# Patient Record
Sex: FEMALE | Race: WHITE | NOT HISPANIC OR LATINO | Employment: UNEMPLOYED | URBAN - METROPOLITAN AREA
[De-identification: names, ages, dates, MRNs, and addresses within clinical notes are randomized per-mention and may not be internally consistent; named-entity substitution may affect disease eponyms.]

---

## 2017-01-12 ENCOUNTER — OFFICE VISIT (OUTPATIENT)
Dept: OBSTETRICS AND GYNECOLOGY | Facility: CLINIC | Age: 52
End: 2017-01-12
Attending: OBSTETRICS & GYNECOLOGY
Payer: COMMERCIAL

## 2017-01-12 VITALS
SYSTOLIC BLOOD PRESSURE: 124 MMHG | BODY MASS INDEX: 24.01 KG/M2 | DIASTOLIC BLOOD PRESSURE: 82 MMHG | HEIGHT: 64 IN | WEIGHT: 140.63 LBS

## 2017-01-12 DIAGNOSIS — Z12.4 SCREENING FOR MALIGNANT NEOPLASM OF THE CERVIX: ICD-10-CM

## 2017-01-12 DIAGNOSIS — N92.1 MENORRHAGIA WITH IRREGULAR CYCLE: ICD-10-CM

## 2017-01-12 DIAGNOSIS — Z01.419 ENCOUNTER FOR GYNECOLOGICAL EXAMINATION WITHOUT ABNORMAL FINDING: ICD-10-CM

## 2017-01-12 DIAGNOSIS — Z12.31 OTHER SCREENING MAMMOGRAM: Primary | ICD-10-CM

## 2017-01-12 DIAGNOSIS — R32 INCONTINENCE OF URINE IN FEMALE: ICD-10-CM

## 2017-01-12 DIAGNOSIS — Z13.9 SCREENING: ICD-10-CM

## 2017-01-12 PROCEDURE — 99999 PR PBB SHADOW E&M-EST. PATIENT-LVL III: CPT | Mod: PBBFAC,,, | Performed by: OBSTETRICS & GYNECOLOGY

## 2017-01-12 PROCEDURE — 99396 PREV VISIT EST AGE 40-64: CPT | Mod: S$GLB,,, | Performed by: OBSTETRICS & GYNECOLOGY

## 2017-01-12 PROCEDURE — 88141 CYTOPATH C/V INTERPRET: CPT | Mod: ,,, | Performed by: PATHOLOGY

## 2017-01-12 PROCEDURE — 88175 CYTOPATH C/V AUTO FLUID REDO: CPT | Performed by: PATHOLOGY

## 2017-01-12 NOTE — PROGRESS NOTES
SUBJECTIVE:   51 y.o. female   for annual routine Pap and checkup. No LMP recorded..  She complains of having daily bleeding on OCPs for last 6 months. She reports that starting OCPs helped with her heavy bleeding but now she has daily bleeding. She also reports leaking urine with cough, sneeze.        Past Medical History   Diagnosis Date    Abnormal Pap smear of vagina     Constipation     Hemorrhoids     Hyperlipidemia     Thyroid nodule      Past Surgical History   Procedure Laterality Date    Colposcopy  2014    Appendectomy      Colonoscopy       4 as of  [facility], next due in     Excisional hemorrhoidectomy       Social History     Social History    Marital status:      Spouse name: N/A    Number of children: N/A    Years of education: N/A     Occupational History           - not practicing     Social History Main Topics    Smoking status: Never Smoker    Smokeless tobacco: Not on file    Alcohol use Yes      Comment: socially    Drug use: No    Sexual activity: Yes     Partners: Male     Birth control/ protection: None, OCP     Other Topics Concern    Not on file     Social History Narrative    Originally from [place]    Living in [place] with family        Getting reg exercise     Family History   Problem Relation Age of Onset    Breast cancer Maternal Grandmother     Cancer Maternal Grandmother      breast    Hypertension Mother     Cancer Father      lung, + tobacco    No Known Problems Brother     No Known Problems Daughter     Other Son      lymphedema    No Known Problems Son     No Known Problems Son     No Known Problems Daughter     Ovarian cancer Neg Hx     Colon cancer Neg Hx      OB History    Para Term  AB SAB TAB Ectopic Multiple Living   5 5              # Outcome Date GA Lbr Charanjit/2nd Weight Sex Delivery Anes PTL Lv   5 Para      Vag-Spont      4 Para      Vag-Spont      3 Para      Vag-Spont      2 Para       Vag-Spont      1 Para      Vag-Spont               Current Outpatient Prescriptions   Medication Sig Dispense Refill    cetirizine (ZYRTEC) 5 MG tablet Take 5 mg by mouth once daily.      desog-e.estradiol/e.estradiol (KARIVA) 0.15-0.02 mgx21 /0.01 mg x 5 per tablet Take 1 tablet by mouth once daily. 28 tablet 11    fluticasone (FLONASE) 50 mcg/actuation nasal spray 1 spray by Each Nare route once daily.      multivitamin capsule Take 1 capsule by mouth once daily.      ferrous gluconate (FERGON) 325 MG Tab Take 1 tablet (325 mg total) by mouth 2 (two) times daily with meals.  0    tamsulosin (FLOMAX) 0.4 mg Cp24 Take 1 capsule (0.4 mg total) by mouth once daily. 30 capsule 0     No current facility-administered medications for this visit.      Allergies: Review of patient's allergies indicates no known allergies.     ROS:  Constitutional: no weight loss, weight gain, fever, fatigue  Eyes:  No vision changes, glasses/contacts  ENT/Mouth: No ulcers, sinus problems, ears ringing, headache  Cardiovascular: No inability to lie flat, chest pain, exercise intolerance, swelling, heart palpitations  Respiratory: No wheezing, coughing blood, shortness of breath, or cough  Gastrointestinal: No diarrhea, bloody stool, nausea/vomiting, constipation, gas, hemorrhoids  Genitourinary: No blood in urine, painful urination, urgency of urination, frequency of urination, incomplete emptying,+incontinence, +abnormal bleeding, painful periods, heavy periods, vaginal discharge, vaginal odor, painful intercourse, sexual problems, bleeding after intercourse.  Musculoskeletal: No muscle weakness  Skin/Breast: No painful breasts, nipple discharge, masses, rash, ulcers  Neurological: No passing out, seizures, numbness, headache  Endocrine: No diabetes, hypothyroid, hyperthyroid, hot flashes, hair loss, abnormal hair growth, acne  Psychiatric: No depression, crying  Hematologic: No bruises, bleeding, swollen lymph nodes,  anemia.      Physical Exam:   Constitutional: She is oriented to person, place, and time. She appears well-developed and well-nourished.      Neck: Normal range of motion. No tracheal deviation present. No thyromegaly present.    Cardiovascular: Exam reveals no edema.     Pulmonary/Chest: Effort normal. She exhibits no mass, no tenderness, no deformity and no retraction. Right breast exhibits no inverted nipple, no mass, no nipple discharge, no skin change, no tenderness, presence, no bleeding and no swelling. Left breast exhibits no inverted nipple, no mass, no nipple discharge, no skin change, no tenderness, presence, no bleeding and no swelling. Breasts are symmetrical.        Abdominal: Soft. She exhibits no distension and no mass. There is no tenderness. There is no rebound and no guarding. No hernia. Hernia confirmed negative in the left inguinal area.     Genitourinary: Vagina normal and uterus normal. Rectal exam shows no external hemorrhoid. There is no rash, tenderness or lesion on the right labia. There is no rash, tenderness or lesion on the left labia. Uterus is not deviated. Cervix is normal. No no adexnal prolapse. Right adnexum displays no mass, no tenderness and no fullness. Left adnexum displays no mass, no tenderness and no fullness. No tenderness, bleeding, rectocele, cystocele or unspecified prolapse of vaginal walls in the vagina. No vaginal discharge found. Cervix exhibits no motion tenderness, no discharge and no friability.           Musculoskeletal: Normal range of motion and moves all extremeties. She exhibits no edema.      Lymphadenopathy:        Right: No inguinal adenopathy present.        Left: No inguinal adenopathy present.    Neurological: She is alert and oriented to person, place, and time.    Skin: No rash noted. No erythema. No pallor.    Psychiatric: She has a normal mood and affect. Her behavior is normal. Judgment and thought content normal.         ASSESSMENT:   well  woman  no contraindication to continue use of oral contraceptives    PLAN:   mammogram  pap smear  return annually or prn  Will order pelvic ultrasound  Consult with Urogyn  Counseled patient to not do continuous OCPS- stop for 1 week then restart

## 2017-01-12 NOTE — MR AVS SNAPSHOT
[facility], [place] [address]  [address]  [place] [address]  Phone: [phone]  Fax: [phone]                  Corinne Morrison Marcus   2017 11:45 AM   Office Visit    Description:  Female : 1965   Provider:  Keshia Marino MD   Department:  [facility], [place] [address]           Reason for Visit     Annual Exam     Vaginal Bleeding                To Do List           Goals (5 Years of Data)     None      [facility]     [facility] On Call Nurse Care Line -  Assistance  Registered nurses in the [facility] On Call Center provide clinical advisement, health education, appointment booking, and other advisory services.  Call for this free service at [phone].             Medications           Message regarding Medications     Verify the changes and/or additions to your medication regime listed below are the same as discussed with your clinician today.  If any of these changes or additions are incorrect, please notify your healthcare provider.             Verify that the below list of medications is an accurate representation of the medications you are currently taking.  If none reported, the list may be blank. If incorrect, please contact your healthcare provider. Carry this list with you in case of emergency.           Current Medications     cetirizine (ZYRTEC) 5 MG tablet Take 5 mg by mouth once daily.    desog-e.estradiol/e.estradiol (KARIVA) 0.15-0.02 mgx21 /0.01 mg x 5 per tablet Take 1 tablet by mouth once daily.    fluticasone (FLONASE) 50 mcg/actuation nasal spray 1 spray by Each Nare route once daily.    multivitamin capsule Take 1 capsule by mouth once daily.    ferrous gluconate (FERGON) 325 MG Tab Take 1 tablet (325 mg total) by mouth 2 (two) times daily with meals.    tamsulosin (FLOMAX) 0.4 mg Cp24 Take 1 capsule (0.4 mg total) by mouth once daily.           Clinical Reference Information           Vital Signs - Last Recorded  Most recent update:  "1/12/2017 12:06 PM by Ange King MA    BP Ht Wt BMI       124/82 5' 4" (1.626 m) 63.8 kg (140 lb 10.5 oz) 24.14 kg/m2       Blood Pressure          Most Recent Value    BP  124/82      Allergies as of 1/12/2017     No Known Allergies      Immunizations Administered on Date of Encounter - 1/12/2017     None      "

## 2017-01-16 ENCOUNTER — TELEPHONE (OUTPATIENT)
Dept: OBSTETRICS AND GYNECOLOGY | Facility: CLINIC | Age: 52
End: 2017-01-16

## 2017-01-16 DIAGNOSIS — N95.1 MENOPAUSAL SYMPTOM: Primary | ICD-10-CM

## 2017-01-18 ENCOUNTER — HOSPITAL ENCOUNTER (OUTPATIENT)
Dept: RADIOLOGY | Facility: OTHER | Age: 52
Discharge: HOME | End: 2017-01-18
Attending: OBSTETRICS & GYNECOLOGY
Payer: COMMERCIAL

## 2017-01-18 DIAGNOSIS — N92.1 MENORRHAGIA WITH IRREGULAR CYCLE: ICD-10-CM

## 2017-01-18 PROCEDURE — 76830 TRANSVAGINAL US NON-OB: CPT | Mod: 26,,, | Performed by: RADIOLOGY

## 2017-01-18 PROCEDURE — 76856 US EXAM PELVIC COMPLETE: CPT | Mod: TC

## 2017-01-18 PROCEDURE — 76856 US EXAM PELVIC COMPLETE: CPT | Mod: 26,,, | Performed by: RADIOLOGY

## 2017-01-24 ENCOUNTER — HOSPITAL ENCOUNTER (OUTPATIENT)
Dept: RADIOLOGY | Facility: OTHER | Age: 52
Discharge: HOME | End: 2017-01-24
Attending: OBSTETRICS & GYNECOLOGY
Payer: COMMERCIAL

## 2017-01-24 ENCOUNTER — INITIAL CONSULT (OUTPATIENT)
Dept: UROGYNECOLOGY | Facility: CLINIC | Age: 52
End: 2017-01-24
Attending: OBSTETRICS & GYNECOLOGY
Payer: COMMERCIAL

## 2017-01-24 VITALS — WEIGHT: 143.31 LBS | HEIGHT: 64 IN | BODY MASS INDEX: 24.46 KG/M2

## 2017-01-24 DIAGNOSIS — K59.01 SLOW TRANSIT CONSTIPATION: ICD-10-CM

## 2017-01-24 DIAGNOSIS — N39.3 SUI (STRESS URINARY INCONTINENCE, FEMALE): Primary | ICD-10-CM

## 2017-01-24 DIAGNOSIS — Z12.31 OTHER SCREENING MAMMOGRAM: ICD-10-CM

## 2017-01-24 PROCEDURE — 77067 SCR MAMMO BI INCL CAD: CPT | Mod: TC

## 2017-01-24 PROCEDURE — 77067 SCR MAMMO BI INCL CAD: CPT | Mod: 26,,, | Performed by: RADIOLOGY

## 2017-01-24 PROCEDURE — 99999 PR PBB SHADOW E&M-EST. PATIENT-LVL II: CPT | Mod: PBBFAC,,, | Performed by: OBSTETRICS & GYNECOLOGY

## 2017-01-24 PROCEDURE — 77063 BREAST TOMOSYNTHESIS BI: CPT | Mod: 26,,, | Performed by: RADIOLOGY

## 2017-01-24 PROCEDURE — 1159F MED LIST DOCD IN RCRD: CPT | Mod: S$GLB,,, | Performed by: OBSTETRICS & GYNECOLOGY

## 2017-01-24 PROCEDURE — 99204 OFFICE O/P NEW MOD 45 MIN: CPT | Mod: S$GLB,,, | Performed by: OBSTETRICS & GYNECOLOGY

## 2017-01-24 NOTE — PROGRESS NOTES
Subjective:       Patient ID: Corinne Morrison Marcus is a 51 y.o. female.    Chief Complaint:  Consult and Urinary Incontinence      History of Present Illness: 50 YO F with bothersome urinary incontinence worsening over the past several years, now especially bothersome with exercise.  Referred by Dr. Marino for evaluation.    HPI   Ohs Peq Urogyn Hpi      Question    1/24/2017  1:37 PM CST     General Urogynecology: Are you experiencing the following?       Dysuria (painful urination)  No     Nocturia:  waking up at night to empty your bladder   Yes     If you answered yes to the previous question, how many times does this happen per night?  1-2,      Enuresis (urine loss during sleep)  No     Dribbling urine after you urinate  No     Hematuria (urine appears red)  No     Type of stream  Strong     Urinary Incontinence (General): Are you experiencing the following?       Past consultation for incontinence: Have you ever seen someone for the evaluation of incontinence?  No     If you answered yes to the previous question, please select all the therapies you have tried.   N/a- I answered no to the previous question     Please note the effectiveness of the therapies.       Need to wear protection to keep clothes dry   Yes     If you answered yes to the previous question, please imelda the protection you use.   Panty liner, only with activity        Pads     If you wear protection, how much wetness is typically on each pad?  Slight     If you wear protection, how often do you have to change per day, if applicable?   2     Stress Symptoms: Are you experiencing the following?       Leakage of urine with cough, laugh and/or sneeze  Yes     If you answered yes to the previous question, what is the frequency in days, weeks and/or months?  Several times a week     Leakage of urine with sex  No     Leakage of urine with bending/ lifting  Yes     Leakage of urine with briskly walking or jogging  Yes     If you lose urine for  "any other reason not previously mentioned, please note it below, if applicable.        Urge Symptoms: Are you experiencing the following?       Urgency ("got to go" feeling)  No     Urge: How frequently do you feel an urge to urinate (feeling like you "gotta go" to the bathroom and can't wait)  Never     Do you experience a leakage of urine when you have a feeling of urgency?   No     Leakage of urine when unaware  Yes     Past use of anticholinergics (medications used to treat overactive bladder)  No     If you answered yes to the previous question, please imelda the anticholinergics you have used:        Have you ever used Mirbetriq (aka Mirabegron)?   No     Prolapse Symptoms: Are you experiencing any of the following?        Falling out/ Bulging/ Heaviness in the vagina  No     Vaginal/ Abdominal Pain/ Pressure  No     Need to strain/ Push to void  No     Need to wait on the toilet before you void  No     Unusual position to urinate (using your hands to push back the vaginal bulge)  No     Sensation of incomplete emptying  No     Past use of pessary device  No     If you answered yes to the previous question, please list the devices you have used below.        Bowel Symptoms: Are you experiencing any of the following?       Constipation  Yes     Diarrhea   No     Hematochezia (bloody stool)  Yes     Incomplete evacuation of stool  Yes     Involuntary loss of formed stool  No     Fecal smearing/urgency  No     Involuntary loss of gas  No     Vaginal Symptoms: Are you experiencing any of the following?        Abnormal vaginal bleeding   Yes     Vaginal dryness  No     Sexually active   Yes     Dyspareunia (painful intercourse)  No     Estrogen use   Yes   HPI reviewed and confirmed with patient     GYN & OB History  No LMP recorded.   Date of Last Pap: 2017    OB History    Para Term  AB SAB TAB Ectopic Multiple Living   5 5              # Outcome Date GA Lbr Charanjit/2nd Weight Sex Delivery Anes PTL " Lv   5 Para      Vag-Spont      4 Para      Vag-Spont      3 Para      Vag-Spont      2 Para      Vag-Spont      1 Para      Vag-Spont           Past Medical History   Diagnosis Date    Abnormal Pap smear of vagina     Constipation     Hemorrhoids     Hyperlipidemia     Thyroid nodule      Past Surgical History   Procedure Laterality Date    Colposcopy  11/19/2014    Appendectomy      Colonoscopy       4 as of 2015 [facility] at [facility], next due in 2025    Excisional hemorrhoidectomy           Current Outpatient Prescriptions:     cetirizine (ZYRTEC) 5 MG tablet, Take 5 mg by mouth once daily., Disp: , Rfl:     desog-e.estradiol/e.estradiol (KARIVA) 0.15-0.02 mgx21 /0.01 mg x 5 per tablet, Take 1 tablet by mouth once daily., Disp: 28 tablet, Rfl: 11    fluticasone (FLONASE) 50 mcg/actuation nasal spray, 1 spray by Each Nare route once daily., Disp: , Rfl:     multivitamin capsule, Take 1 capsule by mouth once daily., Disp: , Rfl:       Review of patient's allergies indicates:  No Known Allergies    Review of Systems  Review of Systems   Constitutional: Negative.  Negative for activity change, appetite change, chills, diaphoresis, fatigue, fever and unexpected weight change.   HENT: Negative.    Eyes: Negative.    Respiratory: Negative.  Negative for apnea, cough and wheezing.    Cardiovascular: Negative.  Negative for chest pain and palpitations.   Gastrointestinal: Positive for constipation. Negative for abdominal distention, abdominal pain, anal bleeding, blood in stool, diarrhea, nausea, rectal pain and vomiting.   Endocrine: Negative.    Genitourinary: Positive for frequency and urgency. Negative for decreased urine volume, difficulty urinating, dyspareunia, dysuria, enuresis, flank pain, genital sores, hematuria, menstrual problem, pelvic pain, vaginal bleeding, vaginal discharge and vaginal pain.   Musculoskeletal: Negative for back pain and gait problem.   Skin: Negative for color change, pallor,  rash and wound.   Allergic/Immunologic: Negative for immunocompromised state.   Neurological: Negative.  Negative for dizziness and speech difficulty.   Hematological: Negative for adenopathy.   Psychiatric/Behavioral: Negative for agitation, behavioral problems, confusion and sleep disturbance.           Objective:     Physical Exam   Constitutional: She is oriented to person, place, and time. She appears well-developed.   HENT:   Head: Normocephalic and atraumatic.   Eyes: Conjunctivae and EOM are normal.   Neck: Normal range of motion. Neck supple.   Cardiovascular: Normal rate, regular rhythm, S1 normal, S2 normal, normal heart sounds and intact distal pulses.    Pulmonary/Chest: Effort normal and breath sounds normal. She exhibits no tenderness.   Abdominal: Soft. Bowel sounds are normal. She exhibits no distension and no mass. There is no hepatosplenomegaly, splenomegaly or hepatomegaly. There is no tenderness. There is no rigidity, no rebound, no guarding and no CVA tenderness. No hernia.   Genitourinary: Pelvic exam was performed with patient supine. Rectum normal, vagina normal, uterus normal, cervix normal, skenes normal and bartholins normal. Right labia normal and left labia normal. Urethra exhibits hypermobility. Urethra exhibits no urethral caruncle, no urethral diverticulum and no urethral mass. Right bartholin is not enlarged and not tender. Left bartholin is not enlarged and not tender. Rectal exam shows resting tone normal and active tone normal. Rectal exam shows no external hemorrhoid, no fissure, no tenderness, anal tone normal and no dovetailing. Guaiac negative stool. There is atrophy in the vagina. No foreign body, tenderness, bleeding, unspecified prolapse of vaginal walls, fistula, mesh exposure or lavator tenderness in the vagina. No vaginal discharge found. Right adnexum displays no mass and no tenderness. Left adnexum displays no mass and no tenderness. Cervix exhibits no motion  tenderness and no discharge. Uterus is not tender and not experiencing uterine prolapse.   PVR: 50 ML  Empty cough stress test: Negative.  Kegel: 3/5    POP-Q  Aa: -2 Ba: -2 C: -5   GH: 3 PB: 2 TVL: 8   Ap: -1 Bp: -1 D: -6                     Musculoskeletal: Normal range of motion.   Lymphadenopathy:     She has no axillary adenopathy.        Right: No inguinal adenopathy present.        Left: No inguinal adenopathy present.   Neurological: She is alert and oriented to person, place, and time. She has normal strength and normal reflexes. Cranial nerves II through XII intact. No cranial nerve deficit.   Skin: Skin is warm, dry and intact.   Psychiatric: She has a normal mood and affect. Her speech is normal and behavior is normal. Judgment normal. Cognition and memory are normal.          Assessment:        1. YELITZA (stress urinary incontinence, female)    2. Slow transit constipation               Plan:      1. Urinary incontinence,stress:   --Bladder diary for 3-7 days, write the number of times you go to the rest room and associated symptoms of urgency or leakage.   --Empty bladder every 3 hours.  Empty well: wait a minute, lean forward on toilet.    --KEGELS: do 10 in AM and 10 in PM, holding each x 10 seconds.  When you feel urge to go, STOP, KEGEL, and when urge has passed, then go to bathroom.  Consider PT in future.    --STRESS:  Pessary vs. Sling     2. Prolapse: Asymptomatic Stage 2 posterior vaginal wall prolapse   --Daily pelvic floor exercises as assigned, consider Pelvic floor PT in future  --Non surgical options discussed with patient    3. Constipation:   Controlling constipation may help bladder urgency/leakage and fiber may better control cholesterol and blood glucose.  Start daily fiber.  Take 1 tsp of fiber powder (psyllium or other sugar-free powder).  Mix in 8 oz of water.  Take x 3-5 days.  Then, increase fiber by 1 tsp every 3-5 days until stool is easy to pass.  Stop and continue at that dose.    Do not exceed 6 tsps/day.  May also use over the counter stool softener 1-2 x/day.  AVOID laxatives.    Approximately 45 min were spent in consult, 90 % in discussion.     Thank you for requesting consultation of your patient.  I look forward to participating in her care.    Curt Irving DO  Female Pelvic Medicine and Reconstructive Surgery  [facility] [place], [place]

## 2017-01-24 NOTE — LETTER
January 26, 2017      Keshia Marino MD  [address] [address]           [facility]  [address] [address]  Phone: [phone]          Patient: Corinne Morrison Marcus   MR Number: [id number]   YOB: 1965   Date of Visit: 1/24/2017       Dear Dr. Keshia Marino:    Thank you for referring Corinne Marcus to me for evaluation. Attached you will find relevant portions of my assessment and plan of care.    If you have questions, please do not hesitate to call me. I look forward to following Corinne Marcus along with you.    Sincerely,    Curt Irving,     Enclosure  CC:  No Recipients    If you would like to receive this communication electronically, please contact [email or web address] or [phone] to request more information on [employer] Link access.    For providers and/or their staff who would like to refer a patient to [facility], please contact us through our one-stop-shop provider referral line, [facility], at [phone].    If you feel you have received this communication in error or would no longer like to receive these types of communications, please e-mail [email or web address]

## 2017-03-14 ENCOUNTER — OFFICE VISIT (OUTPATIENT)
Dept: INTERNAL MEDICINE | Facility: CLINIC | Age: 52
End: 2017-03-14
Attending: INTERNAL MEDICINE
Payer: COMMERCIAL

## 2017-03-14 VITALS
HEART RATE: 78 BPM | BODY MASS INDEX: 24.05 KG/M2 | DIASTOLIC BLOOD PRESSURE: 78 MMHG | SYSTOLIC BLOOD PRESSURE: 114 MMHG | OXYGEN SATURATION: 97 % | WEIGHT: 140.88 LBS | HEIGHT: 64 IN

## 2017-03-14 DIAGNOSIS — Z00.00 ANNUAL PHYSICAL EXAM: Primary | ICD-10-CM

## 2017-03-14 DIAGNOSIS — R73.9 HYPERGLYCEMIA: ICD-10-CM

## 2017-03-14 DIAGNOSIS — E04.1 THYROID NODULE: ICD-10-CM

## 2017-03-14 DIAGNOSIS — E78.5 HYPERLIPIDEMIA, UNSPECIFIED HYPERLIPIDEMIA TYPE: ICD-10-CM

## 2017-03-14 PROCEDURE — 99999 PR PBB SHADOW E&M-EST. PATIENT-LVL III: CPT | Mod: PBBFAC,,, | Performed by: INTERNAL MEDICINE

## 2017-03-14 PROCEDURE — 99396 PREV VISIT EST AGE 40-64: CPT | Mod: S$GLB,,, | Performed by: INTERNAL MEDICINE

## 2017-03-14 NOTE — MR AVS SNAPSHOT
[facility]  [address]  [place] [address]  Phone: [phone]  Fax: [phone]                  Corinne Morrison Marcus   3/14/2017 1:00 PM   Office Visit    Description:  Female : 1965   Provider:  Elaine Fairbanks MD   Department:  [facility]           Reason for Visit     Annual Exam           Diagnoses this Visit        Comments    Annual physical exam    -  Primary     Hyperglycemia         Thyroid nodule         Hyperlipidemia, unspecified hyperlipidemia type                To Do List           Future Appointments        Provider Department Dept Phone    3/28/2017 8:00 AM LAB, [facility] [phone]    3/28/2017 9:00 AM [facility] USOP2 [facility] [phone]    2017 10:30 AM [facility] CT OP LIMIT 450 LBS [facility] [phone]      Goals (5 Years of Data)     None      Follow-Up and Disposition     Return in about 1 year (around 3/14/2018), or if symptoms worsen or fail to improve.      [facility]     [facility] On [facility] Nurse Care Line -  Assistance  Registered nurses in the [facility] On Call Center provide clinical advisement, health education, appointment booking, and other advisory services.  Call for this free service at [phone].             Medications           Message regarding Medications     Verify the changes and/or additions to your medication regime listed below are the same as discussed with your clinician today.  If any of these changes or additions are incorrect, please notify your healthcare provider.             Verify that the below list of medications is an accurate representation of the medications you are currently taking.  If none reported, the list may be blank. If incorrect, please contact your healthcare provider. Carry this list with you in case of emergency.           Current Medications     cetirizine (ZYRTEC) 5 MG tablet Take 5 mg by mouth once  "daily.    desog-e.estradiol/e.estradiol (KARIVA) 0.15-0.02 mgx21 /0.01 mg x 5 per tablet Take 1 tablet by mouth once daily.    fluticasone (FLONASE) 50 mcg/actuation nasal spray 1 spray by Each Nare route once daily.    multivitamin capsule Take 1 capsule by mouth once daily.           Clinical Reference Information           Your Vitals Were     BP Pulse Height Weight Last Period SpO2    114/78 (BP Location: Left arm, Patient Position: Sitting, BP Method: Manual) 78 5' 4" (1.626 m) 63.9 kg (140 lb 14 oz) 01/24/2017 97%    BMI                24.18 kg/m2          Blood Pressure          Most Recent Value    BP  114/78      Allergies as of 3/14/2017     No Known Allergies      Immunizations Administered on Date of Encounter - 3/14/2017     None      Orders Placed During Today's Visit      Normal Orders This Visit    Ambulatory Referral to Endocrinology     Future Labs/Procedures Expected by Expires    Hemoglobin A1c  3/14/2017 3/28/2018    Lipid panel  3/14/2017 3/14/2018    US Soft Tissue Head Neck Thyroid  3/14/2017 3/14/2018      Language Assistance Services     ATTENTION: Language assistance services are available, free of charge. Please call [phone].      ATENCIÓN: Si habla irma, tiene a cotto disposición servicios gratuitos de asistencia lingüística. Llame al [phone].     [facility] Ý: N?u b?n nói Ti?ng Vi?t, có các d?ch v? h? tr? ngôn ng? mi?n phí dành cho b?n. G?i s? [phone].         [facility] complies with applicable Federal civil rights laws and does not discriminate on the basis of race, color, national origin, age, disability, or sex.        "

## 2017-03-14 NOTE — PROGRESS NOTES
Subjective:       Patient ID: Corinne Morrison Marcus is a 51 y.o. female.    Chief Complaint: Annual Exam    HPI     Pt here for annual exam. Reviewed recent labs with pt. She is doing well.   F/u with isac in gyn.   Has hx of thyroid us and s/p fna in 2011 - seen by Dr. Carrera - is due for thyroid US and f/u    Review of Systems    Objective:      Physical Exam   Constitutional: She is oriented to person, place, and time. She appears well-developed and well-nourished.   HENT:   Head: Normocephalic and atraumatic.   Right Ear: External ear normal.   Left Ear: External ear normal.   Nose: Nose normal.   Mouth/Throat: Oropharynx is clear and moist. No oropharyngeal exudate.   No carotid bruits   Eyes: Conjunctivae and EOM are normal.   Neck: Neck supple. No thyromegaly present.   Cardiovascular: Normal rate, regular rhythm, normal heart sounds and intact distal pulses.    Pulmonary/Chest: Effort normal and breath sounds normal.   Abdominal: Soft. Bowel sounds are normal.   Musculoskeletal: She exhibits no edema or tenderness.   Lymphadenopathy:     She has no cervical adenopathy.   Neurological: She is alert and oriented to person, place, and time. Coordination normal.   Skin: Skin is warm and dry.   Psychiatric: She has a normal mood and affect. Her behavior is normal. Judgment and thought content normal.       Assessment:       1. Annual physical exam    2. Hyperglycemia    3. Thyroid nodule    4. Hyperlipidemia, unspecified hyperlipidemia type        Plan:       Corinne was seen today for annual exam.    Diagnoses and all orders for this visit:    Annual physical exam: Recommend daily sunscreen, cardiovascular exercise min 30 min 5 days per week. Seatbelts routinely.    Hyperglycemia  -     Hemoglobin A1c; Future    Thyroid nodule:   -     US Soft Tissue Head Neck Thyroid; Future  -     Ambulatory Referral to Endocrinology    Hyperlipidemia, unspecified hyperlipidemia type: ascvd risk 0.9% - I recommend regular  exercise along with a diet low in fat and cholesterol and high in fiber and fresh fruits and vegetables.   -     Lipid panel; Future    HM: reviewed and udpated. - declines tdap

## 2017-05-25 ENCOUNTER — TELEPHONE (OUTPATIENT)
Dept: INTERNAL MEDICINE | Facility: CLINIC | Age: 52
End: 2017-05-25

## 2017-05-25 NOTE — TELEPHONE ENCOUNTER
"----- Message from Celena West sent at 5/25/2017  7:45 AM CDT -----  Contact: self  Please see comment below.     Appointment Request From: Patricia Mercado    With Provider: Elaine Fairbanks MD [[facility] - Internal Medicine]    Would Accept With:Request to schedule a test or procedure    Preferred Date Range: Any date 6/7/2017 or later    Preferred Times: Any    Reason for visit: Request an Appt    Comments:  Hi would it be possible for you to help me re-schedule 2 appts I missed (my  wound up getting an appointment at [facility] and we just left town) Anyway this is what I need:    1. LAB, BAP - CBC, CMP, Lipids and TSH - then can you share this with Dr. Corey Morris, fax [phone]  - any chance I could do this Wed., June 7th at 10:00 a.m.?    2. [facility] USOP2 - after the test above on June 7th or maybe June 5th at 11:30 (I have a [facility] ct op) scheduled on June 5th at 10:30 a.m.    Thank you for the assistance. Please feel free to call me.  Corinne "Rini" Marcus, birthdate 1965  [phone] cell      "

## 2017-05-25 NOTE — TELEPHONE ENCOUNTER
Pt's appointments and labs have been scheduled as requested. Pt has no further questions at this time.

## 2017-06-05 ENCOUNTER — HOSPITAL ENCOUNTER (OUTPATIENT)
Dept: RADIOLOGY | Facility: OTHER | Age: 52
Discharge: HOME | End: 2017-06-05
Attending: INTERNAL MEDICINE
Payer: COMMERCIAL

## 2017-06-05 DIAGNOSIS — E04.1 THYROID NODULE: ICD-10-CM

## 2017-06-05 DIAGNOSIS — Z77.22 SECOND HAND SMOKE EXPOSURE: ICD-10-CM

## 2017-06-05 PROCEDURE — 76536 US EXAM OF HEAD AND NECK: CPT | Mod: 26,,, | Performed by: RADIOLOGY

## 2017-06-05 PROCEDURE — 71250 CT THORAX DX C-: CPT | Mod: TC

## 2017-06-05 PROCEDURE — 71250 CT THORAX DX C-: CPT | Mod: 26,,, | Performed by: RADIOLOGY

## 2017-06-05 PROCEDURE — 76536 US EXAM OF HEAD AND NECK: CPT | Mod: TC

## 2017-06-07 ENCOUNTER — TELEPHONE (OUTPATIENT)
Dept: INTERNAL MEDICINE | Facility: CLINIC | Age: 52
End: 2017-06-07

## 2017-06-07 ENCOUNTER — LAB VISIT (OUTPATIENT)
Dept: LAB | Facility: OTHER | Age: 52
End: 2017-06-07
Attending: INTERNAL MEDICINE
Payer: COMMERCIAL

## 2017-06-07 DIAGNOSIS — E78.5 HYPERLIPIDEMIA, UNSPECIFIED HYPERLIPIDEMIA TYPE: ICD-10-CM

## 2017-06-07 DIAGNOSIS — R73.9 HYPERGLYCEMIA: ICD-10-CM

## 2017-06-07 DIAGNOSIS — R91.1 LUNG NODULE: Primary | ICD-10-CM

## 2017-06-07 LAB
CHOLEST/HDLC SERPL: 3.1 {RATIO}
HDL/CHOLESTEROL RATIO: 31.9 %
HDLC SERPL-MCNC: 279 MG/DL
HDLC SERPL-MCNC: 89 MG/DL
LDLC SERPL CALC-MCNC: 154.8 MG/DL
NONHDLC SERPL-MCNC: 190 MG/DL
TRIGL SERPL-MCNC: 176 MG/DL

## 2017-06-07 PROCEDURE — 83036 HEMOGLOBIN GLYCOSYLATED A1C: CPT

## 2017-06-07 PROCEDURE — 36415 COLL VENOUS BLD VENIPUNCTURE: CPT

## 2017-06-07 PROCEDURE — 80061 LIPID PANEL: CPT

## 2017-06-07 NOTE — TELEPHONE ENCOUNTER
Message sent to pt via my chart with lab results and updates to plan.   Plan to repeat ct chest in 1 year for monitoring - if stable at that time then no further imaging indicated.

## 2017-06-08 LAB
ESTIMATED AVG GLUCOSE: 108 MG/DL
HBA1C MFR BLD HPLC: 5.4 %

## 2017-08-23 RX ORDER — DESOGESTREL/ETHINYL ESTRADIOL AND ETHINYL ESTRADIOL 21-5 (28)
KIT ORAL
Qty: 28 TABLET | Refills: 0 | Status: SHIPPED | OUTPATIENT
Start: 2017-08-23 | End: 2017-09-19 | Stop reason: SDUPTHER

## 2017-09-19 DIAGNOSIS — Z30.9 ENCOUNTER FOR CONTRACEPTIVE MANAGEMENT, UNSPECIFIED TYPE: Primary | ICD-10-CM

## 2017-09-20 RX ORDER — DESOGESTREL AND ETHINYL ESTRADIOL 21-5 (28)
1 KIT ORAL DAILY
Qty: 28 TABLET | Refills: 4 | Status: SHIPPED | OUTPATIENT
Start: 2017-09-20 | End: 2017-11-14 | Stop reason: SDUPTHER

## 2017-11-06 ENCOUNTER — TELEPHONE (OUTPATIENT)
Dept: OBSTETRICS AND GYNECOLOGY | Facility: CLINIC | Age: 52
End: 2017-11-06

## 2017-11-06 DIAGNOSIS — Z13.220 SCREENING CHOLESTEROL LEVEL: ICD-10-CM

## 2017-11-06 DIAGNOSIS — R63.5 WEIGHT GAIN: Primary | ICD-10-CM

## 2017-11-07 ENCOUNTER — LAB VISIT (OUTPATIENT)
Dept: LAB | Facility: OTHER | Age: 52
End: 2017-11-07
Attending: OBSTETRICS & GYNECOLOGY
Payer: COMMERCIAL

## 2017-11-07 DIAGNOSIS — R63.5 WEIGHT GAIN: ICD-10-CM

## 2017-11-07 DIAGNOSIS — Z13.220 SCREENING CHOLESTEROL LEVEL: ICD-10-CM

## 2017-11-07 LAB
ALBUMIN SERPL BCP-MCNC: 3.7 G/DL
ALP SERPL-CCNC: 58 U/L
ALT SERPL W/O P-5'-P-CCNC: 17 U/L
ANION GAP SERPL CALC-SCNC: 7 MMOL/L
AST SERPL-CCNC: 22 U/L
BILIRUB SERPL-MCNC: 0.8 MG/DL
BUN SERPL-MCNC: 10 MG/DL
CALCIUM SERPL-MCNC: 9.7 MG/DL
CHLORIDE SERPL-SCNC: 102 MMOL/L
CHOLEST SERPL-MCNC: 286 MG/DL
CHOLEST/HDLC SERPL: 2.9 {RATIO}
CO2 SERPL-SCNC: 29 MMOL/L
CREAT SERPL-MCNC: 0.9 MG/DL
EST. GFR  (AFRICAN AMERICAN): >60 ML/MIN/1.73 M^2
EST. GFR  (NON AFRICAN AMERICAN): >60 ML/MIN/1.73 M^2
GLUCOSE SERPL-MCNC: 104 MG/DL
HDLC SERPL-MCNC: 97 MG/DL
HDLC SERPL: 33.9 %
LDLC SERPL CALC-MCNC: 162.4 MG/DL
NONHDLC SERPL-MCNC: 189 MG/DL
POTASSIUM SERPL-SCNC: 4.2 MMOL/L
PROT SERPL-MCNC: 7.8 G/DL
SODIUM SERPL-SCNC: 138 MMOL/L
T3FREE SERPL-MCNC: 2.7 PG/ML
T4 FREE SERPL-MCNC: 1.11 NG/DL
TRIGL SERPL-MCNC: 133 MG/DL
TSH SERPL DL<=0.005 MIU/L-ACNC: 0.85 UIU/ML

## 2017-11-07 PROCEDURE — 36415 COLL VENOUS BLD VENIPUNCTURE: CPT

## 2017-11-07 PROCEDURE — 84443 ASSAY THYROID STIM HORMONE: CPT

## 2017-11-07 PROCEDURE — 80053 COMPREHEN METABOLIC PANEL: CPT

## 2017-11-07 PROCEDURE — 84481 FREE ASSAY (FT-3): CPT

## 2017-11-07 PROCEDURE — 84439 ASSAY OF FREE THYROXINE: CPT

## 2017-11-07 PROCEDURE — 80061 LIPID PANEL: CPT

## 2017-11-09 ENCOUNTER — TELEPHONE (OUTPATIENT)
Dept: OBSTETRICS AND GYNECOLOGY | Facility: CLINIC | Age: 52
End: 2017-11-09

## 2017-11-10 DIAGNOSIS — E78.00 HYPERCHOLESTEREMIA: Primary | ICD-10-CM

## 2017-11-14 DIAGNOSIS — Z30.9 ENCOUNTER FOR CONTRACEPTIVE MANAGEMENT, UNSPECIFIED TYPE: ICD-10-CM

## 2017-11-14 RX ORDER — DESOGESTREL AND ETHINYL ESTRADIOL 21-5 (28)
1 KIT ORAL DAILY
Qty: 28 TABLET | Refills: 4 | Status: SHIPPED | OUTPATIENT
Start: 2017-11-14 | End: 2018-03-22

## 2017-11-16 ENCOUNTER — TELEPHONE (OUTPATIENT)
Dept: OBSTETRICS AND GYNECOLOGY | Facility: CLINIC | Age: 52
End: 2017-11-16

## 2017-11-16 DIAGNOSIS — R53.83 FATIGUE, UNSPECIFIED TYPE: Primary | ICD-10-CM

## 2017-11-19 RX ORDER — DESOGESTREL/ETHINYL ESTRADIOL AND ETHINYL ESTRADIOL 21-5 (28)
KIT ORAL
Qty: 28 TABLET | Refills: 0 | Status: SHIPPED | OUTPATIENT
Start: 2017-11-19 | End: 2018-03-22

## 2017-11-30 ENCOUNTER — TELEPHONE (OUTPATIENT)
Dept: OBSTETRICS AND GYNECOLOGY | Facility: CLINIC | Age: 52
End: 2017-11-30

## 2017-12-13 ENCOUNTER — LAB VISIT (OUTPATIENT)
Dept: LAB | Facility: OTHER | Age: 52
End: 2017-12-13
Attending: OBSTETRICS & GYNECOLOGY
Payer: COMMERCIAL

## 2017-12-13 DIAGNOSIS — R53.83 FATIGUE, UNSPECIFIED TYPE: ICD-10-CM

## 2017-12-13 LAB
T3FREE SERPL-MCNC: 3.7 PG/ML
T4 FREE SERPL-MCNC: 0.97 NG/DL
TSH SERPL DL<=0.005 MIU/L-ACNC: 0.1 UIU/ML

## 2017-12-13 PROCEDURE — 36415 COLL VENOUS BLD VENIPUNCTURE: CPT

## 2017-12-13 PROCEDURE — 84443 ASSAY THYROID STIM HORMONE: CPT

## 2017-12-13 PROCEDURE — 84439 ASSAY OF FREE THYROXINE: CPT

## 2017-12-13 PROCEDURE — 84481 FREE ASSAY (FT-3): CPT

## 2018-01-29 ENCOUNTER — PATIENT MESSAGE (OUTPATIENT)
Dept: OBSTETRICS AND GYNECOLOGY | Facility: CLINIC | Age: 53
End: 2018-01-29

## 2018-02-28 ENCOUNTER — PATIENT MESSAGE (OUTPATIENT)
Dept: OBSTETRICS AND GYNECOLOGY | Facility: CLINIC | Age: 53
End: 2018-02-28

## 2018-03-22 ENCOUNTER — OFFICE VISIT (OUTPATIENT)
Dept: OBSTETRICS AND GYNECOLOGY | Facility: CLINIC | Age: 53
End: 2018-03-22
Attending: OBSTETRICS & GYNECOLOGY
Payer: COMMERCIAL

## 2018-03-22 VITALS
WEIGHT: 141.13 LBS | SYSTOLIC BLOOD PRESSURE: 122 MMHG | BODY MASS INDEX: 25.01 KG/M2 | DIASTOLIC BLOOD PRESSURE: 80 MMHG | HEIGHT: 63 IN

## 2018-03-22 DIAGNOSIS — R53.83 FATIGUE, UNSPECIFIED TYPE: ICD-10-CM

## 2018-03-22 DIAGNOSIS — Z12.31 ENCOUNTER FOR SCREENING MAMMOGRAM FOR MALIGNANT NEOPLASM OF BREAST: ICD-10-CM

## 2018-03-22 DIAGNOSIS — Z12.4 SCREENING FOR MALIGNANT NEOPLASM OF CERVIX: Primary | ICD-10-CM

## 2018-03-22 DIAGNOSIS — Z01.419 ENCOUNTER FOR GYNECOLOGICAL EXAMINATION WITHOUT ABNORMAL FINDING: ICD-10-CM

## 2018-03-22 DIAGNOSIS — N95.1 MENOPAUSAL SYMPTOM: ICD-10-CM

## 2018-03-22 PROCEDURE — 88175 CYTOPATH C/V AUTO FLUID REDO: CPT

## 2018-03-22 PROCEDURE — 99396 PREV VISIT EST AGE 40-64: CPT | Mod: S$GLB,,, | Performed by: OBSTETRICS & GYNECOLOGY

## 2018-03-22 RX ORDER — SPIRONOLACTONE 100 MG/1
100 TABLET, FILM COATED ORAL EVERY MORNING
COMMUNITY
Start: 2018-03-13

## 2018-03-22 RX ORDER — FLUOXETINE 20 MG/1
TABLET ORAL
COMMUNITY
Start: 2018-03-13 | End: 2018-07-24 | Stop reason: ALTCHOICE

## 2018-03-22 NOTE — PROGRESS NOTES
SUBJECTIVE:   52 y.o. female   for annual routine Pap and checkup. Patient's last menstrual period was 2018..  She reports no cycle in February. Her periods prior to this had been heavy.  She reports eating less carbohydrates and sugar. She is testing her blood sugar daily as part of a diet she is following. She reports still having fatigue. .    She reports occasional hot flashes    Past Medical History:   Diagnosis Date    Abnormal Pap smear of cervix     Abnormal Pap smear of vagina     Constipation     Hemorrhoids     HLD (hyperlipidemia)     Lung nodule     Multinodular goiter     s/p bx of 2 nodules in  - benign     Past Surgical History:   Procedure Laterality Date    APPENDECTOMY      [facility]      [facility], next due in     COLPOSCOPY  2014    EXCISIONAL HEMORRHOIDECTOMY       Social History     Social History    Marital status:      Spouse name: N/A    Number of children: N/A    Years of education: N/A     Occupational History           - not practicing     Social History Main Topics    Smoking status: Never Smoker    Smokeless tobacco: Never Used    Alcohol use Yes      Comment: socially    Drug use: No    Sexual activity: Yes     Partners: Male     Birth control/ protection: None     Other Topics Concern    Not on file     Social History Narrative    Originally from [place]    Living in [place] with family        Getting reg exercise     Family History   Problem Relation Age of Onset    Breast cancer Maternal Grandmother 68    Hypertension Mother     Cancer Father      lung, + tobacco    No Known Problems Brother     No Known Problems Daughter     Other Son      lymphedema    No Known Problems Son     No Known Problems Son     No Known Problems Daughter     Ovarian cancer Neg Hx     Colon cancer Neg Hx      OB History    Para Term  AB Living   5 5           SAB TAB Ectopic Multiple Live  Births                  # Outcome Date GA Lbr Charanjit/2nd Weight Sex Delivery Anes PTL Lv   5 Para      Vag-Spont      4 Para      Vag-Spont      3 Para      Vag-Spont      2 Para      Vag-Spont      1 Para      Vag-Spont                 Current Outpatient Prescriptions   Medication Sig Dispense Refill    cetirizine (ZYRTEC) 5 MG tablet Take 5 mg by mouth once daily.      fluticasone (FLONASE) 50 mcg/actuation nasal spray 1 spray by Each Nare route once daily.      multivitamin capsule Take 1 capsule by mouth once daily.      NON FORMULARY MEDICATION Take 60 mg by mouth every morning. COMPOUND THYROID 1 grain (60mg)      FLUoxetine 20 MG tablet       spironolactone (ALDACTONE) 100 MG tablet        No current facility-administered medications for this visit.      Allergies: Patient has no known allergies.     The 10-year ASCVD risk score (Juan PARHAM JrAmy, et al., 2013) is: 1.1%    Values used to calculate the score:      Age: 52 years      Sex: Female      Is Non- : No      Diabetic: No      Tobacco smoker: No      Systolic Blood Pressure: 122 mmHg      Is BP treated: No      HDL Cholesterol: 97 mg/dL      Total Cholesterol: 286 mg/dL      ROS:  Constitutional: no weight loss, weight gain, fever,+ fatigue  Eyes:  No vision changes, glasses/contacts  ENT/Mouth: No ulcers, sinus problems, ears ringing, headache  Cardiovascular: No inability to lie flat, chest pain, exercise intolerance, swelling, heart palpitations  Respiratory: No wheezing, coughing blood, shortness of breath, or cough  Gastrointestinal: No diarrhea, bloody stool, nausea/vomiting, constipation, gas, hemorrhoids  Genitourinary: No blood in urine, painful urination, urgency of urination, frequency of urination, incomplete emptying, incontinence, abnormal bleeding, painful periods, heavy periods, vaginal discharge, vaginal odor, painful intercourse, sexual problems, bleeding after intercourse.  Musculoskeletal: No muscle  weakness  Skin/Breast: No painful breasts, nipple discharge, masses, rash, ulcers  Neurological: No passing out, seizures, numbness, headache  Endocrine: No diabetes, hypothyroid, hyperthyroid, hot flashes, hair loss, abnormal hair growth, acne  Psychiatric: No depression, crying  Hematologic: No bruises, bleeding, swollen lymph nodes, anemia.      Physical Exam:   Constitutional: She is oriented to person, place, and time. She appears well-developed and well-nourished.      Neck: Normal range of motion. No tracheal deviation present. No thyromegaly present.    Cardiovascular: Exam reveals no edema.     Pulmonary/Chest: Effort normal. She exhibits no mass, no tenderness, no deformity and no retraction. Right breast exhibits no inverted nipple, no mass, no nipple discharge, no skin change, no tenderness, presence, no bleeding and no swelling. Left breast exhibits no inverted nipple, no mass, no nipple discharge, no skin change, no tenderness, presence, no bleeding and no swelling. Breasts are symmetrical.        Abdominal: Soft. She exhibits no distension and no mass. There is no tenderness. There is no rebound and no guarding. No hernia. Hernia confirmed negative in the left inguinal area.     Genitourinary: Vagina normal and uterus normal. Rectal exam shows no external hemorrhoid. There is no rash, tenderness or lesion on the right labia. There is no rash, tenderness or lesion on the left labia. Uterus is not deviated. Cervix is normal. No no adexnal prolapse. Right adnexum displays no mass, no tenderness and no fullness. Left adnexum displays no mass, no tenderness and no fullness. No tenderness, bleeding, rectocele, cystocele or unspecified prolapse of vaginal walls in the vagina. No vaginal discharge found. Cervix exhibits no motion tenderness, no discharge and no friability.           Musculoskeletal: Normal range of motion and moves all extremeties. She exhibits no edema.      Lymphadenopathy:        Right: No  inguinal adenopathy present.        Left: No inguinal adenopathy present.    Neurological: She is alert and oriented to person, place, and time.    Skin: No rash noted. No erythema. No pallor.    Psychiatric: She has a normal mood and affect. Her behavior is normal. Judgment and thought content normal.         ASSESSMENT:   well woman  Menopausal symptoms  PLAN:   mammogram  pap smear  Check labs  return annually or prn

## 2018-03-27 ENCOUNTER — HOSPITAL ENCOUNTER (OUTPATIENT)
Dept: RADIOLOGY | Facility: OTHER | Age: 53
Discharge: HOME | End: 2018-03-27
Attending: OBSTETRICS & GYNECOLOGY
Payer: COMMERCIAL

## 2018-03-27 VITALS — HEIGHT: 63 IN | WEIGHT: 141 LBS | BODY MASS INDEX: 24.98 KG/M2

## 2018-03-27 DIAGNOSIS — Z12.31 ENCOUNTER FOR SCREENING MAMMOGRAM FOR MALIGNANT NEOPLASM OF BREAST: ICD-10-CM

## 2018-03-27 PROCEDURE — 77067 SCR MAMMO BI INCL CAD: CPT | Mod: TC

## 2018-03-27 PROCEDURE — 77067 SCR MAMMO BI INCL CAD: CPT | Mod: 26,,, | Performed by: RADIOLOGY

## 2018-03-27 PROCEDURE — 77063 BREAST TOMOSYNTHESIS BI: CPT | Mod: 26,,, | Performed by: RADIOLOGY

## 2018-03-29 ENCOUNTER — HOSPITAL ENCOUNTER (OUTPATIENT)
Dept: RADIOLOGY | Facility: OTHER | Age: 53
Discharge: HOME | End: 2018-03-29
Attending: OBSTETRICS & GYNECOLOGY
Payer: COMMERCIAL

## 2018-03-29 ENCOUNTER — TELEPHONE (OUTPATIENT)
Dept: OBSTETRICS AND GYNECOLOGY | Facility: CLINIC | Age: 53
End: 2018-03-29

## 2018-03-29 DIAGNOSIS — R92.8 ABNORMAL MAMMOGRAM: ICD-10-CM

## 2018-03-29 PROCEDURE — 77061 BREAST TOMOSYNTHESIS UNI: CPT | Mod: 26,LT,, | Performed by: RADIOLOGY

## 2018-03-29 PROCEDURE — 76642 ULTRASOUND BREAST LIMITED: CPT | Mod: 26,LT,, | Performed by: RADIOLOGY

## 2018-03-29 PROCEDURE — 77061 BREAST TOMOSYNTHESIS UNI: CPT | Mod: TC,LT

## 2018-03-29 PROCEDURE — 77065 DX MAMMO INCL CAD UNI: CPT | Mod: 26,LT,, | Performed by: RADIOLOGY

## 2018-03-29 PROCEDURE — 76642 ULTRASOUND BREAST LIMITED: CPT | Mod: TC,LT

## 2018-03-29 PROCEDURE — 77065 DX MAMMO INCL CAD UNI: CPT | Mod: TC,LT

## 2018-03-29 RX ORDER — THYROID 30 MG/1
30 TABLET ORAL
Qty: 30 TABLET | Refills: 11 | Status: SHIPPED | OUTPATIENT
Start: 2018-03-29 | End: 2019-05-21

## 2018-07-12 ENCOUNTER — PATIENT OUTREACH (OUTPATIENT)
Dept: ADMINISTRATIVE | Facility: HOSPITAL | Age: 53
End: 2018-07-12

## 2018-07-13 NOTE — PROGRESS NOTES
Dear Corinne Morrison Marcus      Here at [facility] we care about you. After careful review it appears that you are in need of the following immunizations.....    Health Maintenance Due   Topic Date Due    TETANUS VACCINE  10/05/1983             Please feel free to contact your primary care physician  Elaine Fairbanks MD office to schedule a nurse visit or can visit any one  of our pharmacy locations to receive your vaccinations. You can also receive your vaccination at your local pharmacy. If you receive your vaccination at your local pharmacy we ask  that you please provide your Elaine Fairbanks MD office with this information to update your medical record.    If you have had any of the above done at another facility, please let us know by calling [phone]; so that we can update your record.  We will add these results to your chart if you fax them to,[phone].    Thanks,             Additional Information  If you have questions, you can email [email or web address] or call [phone]  to talk to our [facility] staff. Remember, [facility] is NOT to be used for urgent needs. For medical emergencies, dial 911.

## 2018-07-23 ENCOUNTER — TELEPHONE (OUTPATIENT)
Dept: OBSTETRICS AND GYNECOLOGY | Facility: CLINIC | Age: 53
End: 2018-07-23

## 2018-07-24 ENCOUNTER — OFFICE VISIT (OUTPATIENT)
Dept: INTERNAL MEDICINE | Facility: CLINIC | Age: 53
End: 2018-07-24
Attending: INTERNAL MEDICINE
Payer: COMMERCIAL

## 2018-07-24 VITALS
DIASTOLIC BLOOD PRESSURE: 72 MMHG | SYSTOLIC BLOOD PRESSURE: 132 MMHG | OXYGEN SATURATION: 99 % | RESPIRATION RATE: 12 BRPM | BODY MASS INDEX: 25.11 KG/M2 | HEART RATE: 75 BPM | HEIGHT: 64 IN | WEIGHT: 147.06 LBS

## 2018-07-24 DIAGNOSIS — R91.1 LUNG NODULE: ICD-10-CM

## 2018-07-24 DIAGNOSIS — Z23 NEED FOR TDAP VACCINATION: ICD-10-CM

## 2018-07-24 DIAGNOSIS — F32.A DEPRESSION, UNSPECIFIED DEPRESSION TYPE: ICD-10-CM

## 2018-07-24 DIAGNOSIS — E04.1 THYROID NODULE: ICD-10-CM

## 2018-07-24 DIAGNOSIS — E78.00 HYPERCHOLESTEROLEMIA: ICD-10-CM

## 2018-07-24 DIAGNOSIS — N95.1 PERIMENOPAUSAL: ICD-10-CM

## 2018-07-24 DIAGNOSIS — Z00.00 ANNUAL PHYSICAL EXAM: Primary | ICD-10-CM

## 2018-07-24 PROCEDURE — 99396 PREV VISIT EST AGE 40-64: CPT | Mod: S$GLB,,, | Performed by: INTERNAL MEDICINE

## 2018-07-24 PROCEDURE — 99999 PR PBB SHADOW E&M-EST. PATIENT-LVL III: CPT | Mod: PBBFAC,,, | Performed by: INTERNAL MEDICINE

## 2018-07-24 RX ORDER — VENLAFAXINE HYDROCHLORIDE 75 MG/1
75 CAPSULE, EXTENDED RELEASE ORAL DAILY
Qty: 30 CAPSULE | Refills: 1 | Status: SHIPPED | OUTPATIENT
Start: 2018-07-24 | End: 2018-09-09 | Stop reason: SDUPTHER

## 2018-07-24 RX ORDER — VENLAFAXINE HYDROCHLORIDE 37.5 MG/1
37.5 CAPSULE, EXTENDED RELEASE ORAL DAILY
Qty: 7 CAPSULE | Refills: 0 | Status: SHIPPED | OUTPATIENT
Start: 2018-07-24 | End: 2018-09-10 | Stop reason: ALTCHOICE

## 2018-07-24 NOTE — PROGRESS NOTES
"Subjective:   Patient ID: Corinne Morrison Marcus is a 52 y.o. female  Chief complaint:   Chief Complaint   Patient presents with    Follow-up     discuss possible menopause       HPI  Pt here for annual exam   Reports gained 10 pounds in past year - discussed with gyn per pt - exercising by participating in body pump, tennis, [employer] 3 days - reports gaining in stomach - Still having periods -perimenopausal   Drinking white wine a few nights per week   Not skipping meals, watching portions, eating 3 meals per day     Hypothyroidism: was not taking medication on empty stomach - was taking with other meds - started taking alone x 1 week     Started on paroxetine x 2 years for depression and anxiety when  dx with melanoma - symptoms stable on current med but concerned that in part contributing to wt gain     Review of Systems    Objective:  Vitals:    07/24/18 1317   BP: 132/72   BP Location: Left arm   Patient Position: Sitting   BP Method: Medium (Manual)   Pulse: 75   Resp: 12   SpO2: 99%   Weight: 66.7 kg (147 lb 0.8 oz)   Height: 5' 4" (1.626 m)     Body mass index is 25.24 kg/m².    Physical Exam   Constitutional: She is oriented to person, place, and time. She appears well-developed and well-nourished.   HENT:   Head: Normocephalic and atraumatic.   Right Ear: External ear normal.   Left Ear: External ear normal.   Nose: Nose normal.   Mouth/Throat: Oropharynx is clear and moist. No oropharyngeal exudate.   No carotid bruits   Eyes: Conjunctivae and EOM are normal.   Neck: Neck supple. No thyromegaly present.   Cardiovascular: Normal rate, regular rhythm, normal heart sounds and intact distal pulses.    Pulmonary/Chest: Effort normal and breath sounds normal.   Abdominal: Soft. Bowel sounds are normal.   Musculoskeletal: She exhibits no edema or tenderness.   Lymphadenopathy:     She has no cervical adenopathy.   Neurological: She is alert and oriented to person, place, and time.   Skin: Skin is " warm and dry.   Psychiatric: Her behavior is normal. Thought content normal.   Vitals reviewed.      Assessment:  1. Annual physical exam    2. Need for Tdap vaccination    3. Depression, unspecified depression type    4. Hypercholesterolemia    5. Thyroid nodule    6. Lung nodule    7. Perimenopausal        Plan:  Corinne was seen today for follow-up.    Diagnoses and all orders for this visit:    Annual physical exam  -     TSH; Future  -     CBC auto differential; Future  -     Comprehensive metabolic panel; Future  -     Lipid panel; Future  Recommend daily sunscreen, cardiovascular exercise min 30 min 5 days per week. Seatbelts routinely.    Need for Tdap vaccination  To obtain at pharmacy    Depression, unspecified depression type  Stable but c/f wt gain on ssri - will switch to effexor - start 37.5mg daily x 7 days and then inc to 75mg. Stop fluoxetine in 1 week.   If any issues with med change she understands to let me know  Pt counseled to not miss doses   No si/hi/carballo    Hypercholesterolemia  Check flp - will give further recs pending those results   Cardiac CT ordered by gyn   I recommend regular exercise along with a diet low in fat and cholesterol and high in fiber and fresh fruits and vegetables.     Thyroid nodule  Due for thyroid US in 2019    Lung nodule  Schedule CT     Other orders  -     venlafaxine (EFFEXOR-XR) 37.5 MG 24 hr capsule; Take 1 capsule (37.5 mg total) by mouth once daily. X 7 days and then start 75mg dosing  -     venlafaxine (EFFEXOR-XR) 75 MG 24 hr capsule; Take 1 capsule (75 mg total) by mouth once daily. Start after complete effexor 37.5mg dosing    Permenopausal: dec etoh intake   Cont reg exercise   Trial of switching to effexor   Check TSH    Health Maintenance   Topic Date Due    TETANUS VACCINE  10/05/1983    Influenza Vaccine  08/01/2018    Mammogram  03/29/2020    Pap Smear with HPV Cotest  03/22/2021    Lipid Panel  11/07/2022    Colonoscopy  04/29/2025     Hepatitis C Screening  Completed

## 2018-07-25 ENCOUNTER — HOSPITAL ENCOUNTER (OUTPATIENT)
Dept: RADIOLOGY | Facility: OTHER | Age: 53
Discharge: HOME | End: 2018-07-25
Attending: OBSTETRICS & GYNECOLOGY

## 2018-07-25 DIAGNOSIS — E78.00 HYPERCHOLESTEREMIA: ICD-10-CM

## 2018-07-25 PROCEDURE — 75571 CT HRT W/O DYE W/CA TEST: CPT | Mod: 26,,, | Performed by: RADIOLOGY

## 2018-07-25 PROCEDURE — 75571 CT HRT W/O DYE W/CA TEST: CPT | Mod: TC

## 2018-08-02 ENCOUNTER — LAB VISIT (OUTPATIENT)
Dept: LAB | Facility: OTHER | Age: 53
End: 2018-08-02
Attending: INTERNAL MEDICINE
Payer: COMMERCIAL

## 2018-08-02 DIAGNOSIS — Z00.00 ANNUAL PHYSICAL EXAM: ICD-10-CM

## 2018-08-02 LAB
ALBUMIN SERPL BCP-MCNC: 3.9 G/DL
ALP SERPL-CCNC: 63 U/L
ALT SERPL W/O P-5'-P-CCNC: 17 U/L
ANION GAP SERPL CALC-SCNC: 11 MMOL/L
AST SERPL-CCNC: 22 U/L
BASOPHILS # BLD AUTO: 0.04 K/UL
BASOPHILS NFR BLD: 0.6 %
BILIRUB SERPL-MCNC: 0.6 MG/DL
BUN SERPL-MCNC: 14 MG/DL
CALCIUM SERPL-MCNC: 9.8 MG/DL
CHLORIDE SERPL-SCNC: 102 MMOL/L
CHOLEST SERPL-MCNC: 298 MG/DL
CHOLEST/HDLC SERPL: 3.1 {RATIO}
CO2 SERPL-SCNC: 26 MMOL/L
CREAT SERPL-MCNC: 0.8 MG/DL
DIFFERENTIAL METHOD: ABNORMAL
EOSINOPHIL # BLD AUTO: 0.1 K/UL
EOSINOPHIL NFR BLD: 1.9 %
ERYTHROCYTE [DISTWIDTH] IN BLOOD BY AUTOMATED COUNT: 13.3 %
EST. GFR  (AFRICAN AMERICAN): >60 ML/MIN/1.73 M^2
EST. GFR  (NON AFRICAN AMERICAN): >60 ML/MIN/1.73 M^2
GLUCOSE SERPL-MCNC: 96 MG/DL
HCT VFR BLD AUTO: 39.3 %
HDLC SERPL-MCNC: 95 MG/DL
HDLC SERPL: 31.9 %
HGB BLD-MCNC: 12.8 G/DL
LDLC SERPL CALC-MCNC: 180.2 MG/DL
LYMPHOCYTES # BLD AUTO: 2.2 K/UL
LYMPHOCYTES NFR BLD: 34.2 %
MCH RBC QN AUTO: 30 PG
MCHC RBC AUTO-ENTMCNC: 32.6 G/DL
MCV RBC AUTO: 92 FL
MONOCYTES # BLD AUTO: 0.8 K/UL
MONOCYTES NFR BLD: 12.1 %
NEUTROPHILS # BLD AUTO: 3.3 K/UL
NEUTROPHILS NFR BLD: 51 %
NONHDLC SERPL-MCNC: 203 MG/DL
PLATELET # BLD AUTO: 385 K/UL
PMV BLD AUTO: 9.1 FL
POTASSIUM SERPL-SCNC: 4 MMOL/L
PROT SERPL-MCNC: 7.8 G/DL
RBC # BLD AUTO: 4.27 M/UL
SODIUM SERPL-SCNC: 139 MMOL/L
TRIGL SERPL-MCNC: 114 MG/DL
TSH SERPL DL<=0.005 MIU/L-ACNC: 0.52 UIU/ML
WBC # BLD AUTO: 6.47 K/UL

## 2018-08-02 PROCEDURE — 84443 ASSAY THYROID STIM HORMONE: CPT

## 2018-08-02 PROCEDURE — 36415 COLL VENOUS BLD VENIPUNCTURE: CPT

## 2018-08-02 PROCEDURE — 85025 COMPLETE CBC W/AUTO DIFF WBC: CPT

## 2018-08-02 PROCEDURE — 80053 COMPREHEN METABOLIC PANEL: CPT

## 2018-08-02 PROCEDURE — 80061 LIPID PANEL: CPT

## 2018-09-10 RX ORDER — VENLAFAXINE HYDROCHLORIDE 75 MG/1
75 CAPSULE, EXTENDED RELEASE ORAL DAILY
Qty: 90 CAPSULE | Refills: 3 | Status: SHIPPED | OUTPATIENT
Start: 2018-09-10 | End: 2019-05-21

## 2018-10-09 DIAGNOSIS — Z30.9 ENCOUNTER FOR CONTRACEPTIVE MANAGEMENT, UNSPECIFIED TYPE: ICD-10-CM

## 2018-10-09 RX ORDER — DESOGESTREL AND ETHINYL ESTRADIOL AND ETHINYL ESTRADIOL 21-5 (28)
KIT ORAL
Qty: 28 TABLET | Refills: 0 | Status: SHIPPED | OUTPATIENT
Start: 2018-10-09 | End: 2019-05-09

## 2019-03-01 ENCOUNTER — TELEPHONE (OUTPATIENT)
Dept: OBSTETRICS AND GYNECOLOGY | Facility: CLINIC | Age: 54
End: 2019-03-01

## 2019-03-01 RX ORDER — NITROFURANTOIN 25; 75 MG/1; MG/1
100 CAPSULE ORAL 2 TIMES DAILY
Qty: 14 CAPSULE | Refills: 0 | Status: SHIPPED | OUTPATIENT
Start: 2019-03-01 | End: 2019-03-08

## 2019-03-01 RX ORDER — FLUCONAZOLE 150 MG/1
150 TABLET ORAL ONCE
Qty: 1 TABLET | Refills: 1 | Status: SHIPPED | OUTPATIENT
Start: 2019-03-01 | End: 2019-03-01

## 2019-03-18 NOTE — TELEPHONE ENCOUNTER
Please call pt and inquire if she is requesting refill for effexor?     Dose requested was for 37.5mg - is she taking this or the previous dose of 75mg? Is she trying to wean off with a lower dose at 37.5 - please clarify med request

## 2019-03-19 NOTE — TELEPHONE ENCOUNTER
Called pt and LVM stating for her to return office call back to confirm if she is taking effexor 37.5mg or 75 mg .

## 2019-03-22 RX ORDER — VENLAFAXINE HYDROCHLORIDE 37.5 MG/1
CAPSULE, EXTENDED RELEASE ORAL
Qty: 7 CAPSULE | Refills: 0 | OUTPATIENT
Start: 2019-03-22

## 2019-04-11 ENCOUNTER — TELEPHONE (OUTPATIENT)
Dept: OBSTETRICS AND GYNECOLOGY | Facility: CLINIC | Age: 54
End: 2019-04-11

## 2019-04-11 DIAGNOSIS — Z12.39 SCREENING FOR BREAST CANCER: Primary | ICD-10-CM

## 2019-04-17 ENCOUNTER — TELEPHONE (OUTPATIENT)
Dept: OBSTETRICS AND GYNECOLOGY | Facility: CLINIC | Age: 54
End: 2019-04-17

## 2019-04-17 ENCOUNTER — PATIENT MESSAGE (OUTPATIENT)
Dept: OBSTETRICS AND GYNECOLOGY | Facility: CLINIC | Age: 54
End: 2019-04-17

## 2019-04-17 NOTE — TELEPHONE ENCOUNTER
1st call attempt made 04/11/2019 at 1614. Voicemail requesting return call for Mammo/WWE scheduling.   2nd attempt made this morning. Voicemail requesting return call to discuss availability for screening mammogram and [facility]. Message left for patient via patient portal. AMAURI Molina.

## 2019-04-18 ENCOUNTER — HOSPITAL ENCOUNTER (OUTPATIENT)
Dept: RADIOLOGY | Facility: OTHER | Age: 54
Discharge: HOME | End: 2019-04-18
Attending: OBSTETRICS & GYNECOLOGY
Payer: COMMERCIAL

## 2019-04-18 DIAGNOSIS — Z12.39 SCREENING FOR BREAST CANCER: ICD-10-CM

## 2019-04-18 PROCEDURE — 77063 BREAST TOMOSYNTHESIS BI: CPT | Mod: 26,,, | Performed by: RADIOLOGY

## 2019-04-18 PROCEDURE — 77067 SCR MAMMO BI INCL CAD: CPT | Mod: TC

## 2019-04-18 PROCEDURE — 77067 MAMMO DIGITAL SCREENING BILAT WITH TOMOSYNTHESIS_CAD: ICD-10-PCS | Mod: 26,,, | Performed by: RADIOLOGY

## 2019-04-18 PROCEDURE — 77063 MAMMO DIGITAL SCREENING BILAT WITH TOMOSYNTHESIS_CAD: ICD-10-PCS | Mod: 26,,, | Performed by: RADIOLOGY

## 2019-04-18 PROCEDURE — 77067 SCR MAMMO BI INCL CAD: CPT | Mod: 26,,, | Performed by: RADIOLOGY

## 2019-04-22 ENCOUNTER — TELEPHONE (OUTPATIENT)
Dept: OBSTETRICS AND GYNECOLOGY | Facility: CLINIC | Age: 54
End: 2019-04-22

## 2019-04-22 NOTE — TELEPHONE ENCOUNTER
----- Message from Keshia Marino MD sent at 4/19/2019  2:01 PM CDT -----  I am going to open up May 9- I am post call so I will see patients 1/2 day. Last patient at 11am.  Please offer her that day.

## 2019-05-09 ENCOUNTER — HOSPITAL ENCOUNTER (OUTPATIENT)
Dept: RADIOLOGY | Facility: OTHER | Age: 54
Discharge: HOME | End: 2019-05-09
Attending: OBSTETRICS & GYNECOLOGY
Payer: COMMERCIAL

## 2019-05-09 ENCOUNTER — OFFICE VISIT (OUTPATIENT)
Dept: OBSTETRICS AND GYNECOLOGY | Facility: CLINIC | Age: 54
End: 2019-05-09
Attending: OBSTETRICS & GYNECOLOGY
Payer: COMMERCIAL

## 2019-05-09 VITALS
WEIGHT: 145.94 LBS | SYSTOLIC BLOOD PRESSURE: 124 MMHG | BODY MASS INDEX: 25.86 KG/M2 | HEIGHT: 63 IN | DIASTOLIC BLOOD PRESSURE: 82 MMHG

## 2019-05-09 DIAGNOSIS — N95.1 MENOPAUSAL SYMPTOMS: ICD-10-CM

## 2019-05-09 DIAGNOSIS — R91.1 LUNG NODULE: ICD-10-CM

## 2019-05-09 DIAGNOSIS — Z13.21 ENCOUNTER FOR VITAMIN DEFICIENCY SCREENING: ICD-10-CM

## 2019-05-09 DIAGNOSIS — Z01.419 ENCOUNTER FOR GYNECOLOGICAL EXAMINATION WITHOUT ABNORMAL FINDING: ICD-10-CM

## 2019-05-09 DIAGNOSIS — Z12.4 SCREENING FOR MALIGNANT NEOPLASM OF CERVIX: Primary | ICD-10-CM

## 2019-05-09 PROCEDURE — 71250 CT THORAX DX C-: CPT | Mod: 26,,, | Performed by: RADIOLOGY

## 2019-05-09 PROCEDURE — 88175 CYTOPATH C/V AUTO FLUID REDO: CPT

## 2019-05-09 PROCEDURE — 99396 PR PREVENTIVE VISIT,EST,40-64: ICD-10-PCS | Mod: S$GLB,,, | Performed by: OBSTETRICS & GYNECOLOGY

## 2019-05-09 PROCEDURE — 87624 HPV HI-RISK TYP POOLED RSLT: CPT

## 2019-05-09 PROCEDURE — 71250 CT CHEST WITHOUT CONTRAST: ICD-10-PCS | Mod: 26,,, | Performed by: RADIOLOGY

## 2019-05-09 PROCEDURE — 99396 PREV VISIT EST AGE 40-64: CPT | Mod: S$GLB,,, | Performed by: OBSTETRICS & GYNECOLOGY

## 2019-05-09 PROCEDURE — 71250 CT THORAX DX C-: CPT | Mod: TC

## 2019-05-10 NOTE — PROGRESS NOTES
SUBJECTIVE:   53 y.o. female   for annual routine Pap and checkup. Patient's last menstrual period was 2019..  She reports no period since March- she is having hot flashes and night sweats. She is having some life stressors related to her family.         Past Medical History:   Diagnosis Date    Abnormal Pap smear of cervix     Abnormal Pap smear of vagina     Anxiety     Constipation     Depression     Hemorrhoids     HLD (hyperlipidemia)     Lung nodule     Multinodular goiter     s/p bx of 2 nodules in  - benign     Past Surgical History:   Procedure Laterality Date    APPENDECTOMY      [facility], next due in     COLPOSCOPY  2014    EXCISIONAL HEMORRHOIDECTOMY      TOTAL REDUCTION MAMMOPLASTY       Social History     Socioeconomic History    Marital status:      Spouse name: Not on file    Number of children: Not on file    Years of education: Not on file    Highest education level: Not on file   Occupational History    Occupation:      Comment:  - not practicing   Social Needs    Financial resource strain: Not on file    Food insecurity:     Worry: Not on file     Inability: Not on file    Transportation needs:     Medical: Not on file     Non-medical: Not on file   Tobacco Use    Smoking status: Never Smoker    Smokeless tobacco: Never Used   Substance and Sexual Activity    Alcohol use: Yes     Comment: socially    Drug use: No    Sexual activity: Yes     Partners: Male     Birth control/protection: None   Lifestyle    Physical activity:     Days per week: Not on file     Minutes per session: Not on file    Stress: Not on file   Relationships    Social connections:     Talks on phone: Not on file     Gets together: Not on file     Attends Sikhism service: Not on file     Active member of club or organization: Not on file     Attends meetings of clubs or organizations: Not on file      Relationship status: Not on file   Other Topics Concern    Not on file   Social History Narrative    Originally from [place]    Living in [place] with family        Getting reg exercise     Family History   Problem Relation Age of Onset    Breast cancer Maternal Grandmother 68    Hypertension Mother     Cancer Father         lung, + tobacco    No Known Problems Brother     No Known Problems Daughter     Other Son         lymphedema    No Known Problems Son     No Known Problems Son     No Known Problems Daughter     Ovarian cancer Neg Hx     Colon cancer Neg Hx      OB History    Para Term  AB Living   5 5 0         SAB TAB Ectopic Multiple Live Births                  # Outcome Date GA Lbr Charanjit/2nd Weight Sex Delivery Anes PTL Lv   5 Para      Vag-Spont      4 Para      Vag-Spont      3 Para      Vag-Spont      2 Para      Vag-Spont      1 Para      Vag-Spont              Current Outpatient Medications   Medication Sig Dispense Refill    cetirizine (ZYRTEC) 5 MG tablet Take 5 mg by mouth once daily.      fluticasone (FLONASE) 50 mcg/actuation nasal spray 1 spray by Each Nare route once daily.      spironolactone (ALDACTONE) 100 MG tablet       thyroid (ARMOUR THYROID) Tab Take 1 tablet (30 mg total) by mouth before breakfast. 30 tablet 11    venlafaxine (EFFEXOR-XR) 75 MG 24 hr capsule Take 1 capsule (75 mg total) by mouth once daily. 90 capsule 3     No current facility-administered medications for this visit.      Allergies: Patient has no known allergies.     The 10-year ASCVD risk score (Brandonbhavya PARHAM Jr., et al., 2013) is: 1.4%    Values used to calculate the score:      Age: 53 years      Sex: Female      Is Non- : No      Diabetic: No      Tobacco smoker: No      Systolic Blood Pressure: 124 mmHg      Is BP treated: No      HDL Cholesterol: 95 mg/dL      Total Cholesterol: 298 mg/dL      ROS:  Constitutional: no weight loss, weight gain, fever, fatigue  Eyes:  No  vision changes, glasses/contacts  ENT/Mouth: No ulcers, sinus problems, ears ringing, headache  Cardiovascular: No inability to lie flat, chest pain, exercise intolerance, swelling, heart palpitations  Respiratory: No wheezing, coughing blood, shortness of breath, or cough  Gastrointestinal: No diarrhea, bloody stool, nausea/vomiting, constipation, gas, hemorrhoids  Genitourinary: No blood in urine, painful urination, urgency of urination, frequency of urination, incomplete emptying, incontinence, abnormal bleeding, painful periods, heavy periods, vaginal discharge, vaginal odor, painful intercourse, sexual problems, bleeding after intercourse, +irregular period.  Musculoskeletal: No muscle weakness  Skin/Breast: No painful breasts, nipple discharge, masses, rash, ulcers  Neurological: No passing out, seizures, numbness, headache  Endocrine: No diabetes, hypothyroid, hyperthyroid, +hot flashes, hair loss, abnormal hair growth, acne  Psychiatric: No depression, +crying  Hematologic: No bruises, bleeding, swollen lymph nodes, anemia.      Physical Exam:   Constitutional: She is oriented to person, place, and time. She appears well-developed and well-nourished.      Neck: Normal range of motion. No tracheal deviation present. No thyromegaly present.    Cardiovascular: Exam reveals no edema.     Pulmonary/Chest: Effort normal. She exhibits no mass, no tenderness, no deformity and no retraction. Right breast exhibits no inverted nipple, no mass, no nipple discharge, no skin change, no tenderness, presence, no bleeding and no swelling. Left breast exhibits no inverted nipple, no mass, no nipple discharge, no skin change, no tenderness, presence, no bleeding and no swelling. Breasts are symmetrical.        Abdominal: Soft. She exhibits no distension and no mass. There is no tenderness. There is no rebound and no guarding. No hernia. Hernia confirmed negative in the left inguinal area.     Genitourinary: Vagina normal and  uterus normal. Rectal exam shows no external hemorrhoid. There is no rash, tenderness or lesion on the right labia. There is no rash, tenderness or lesion on the left labia. Uterus is not deviated. Cervix is normal. No no adexnal prolapse. Right adnexum displays no mass, no tenderness and no fullness. Left adnexum displays no mass, no tenderness and no fullness. No tenderness, bleeding, rectocele, cystocele or unspecified prolapse of vaginal walls in the vagina. No vaginal discharge found. Cervix exhibits no motion tenderness, no discharge and no friability.           Musculoskeletal: Normal range of motion and moves all extremeties. She exhibits no edema.      Lymphadenopathy:        Right: No inguinal adenopathy present.        Left: No inguinal adenopathy present.    Neurological: She is alert and oriented to person, place, and time.    Skin: No rash noted. No erythema. No pallor.    Psychiatric: She has a normal mood and affect. Her behavior is normal. Judgment and thought content normal.         ASSESSMENT:   well woman  Lung nodule  Menopausal symptoms  PLAN:   mammogram  pap smear  Will order labs today  Will order CT scan to follow up on lung nodule  She is going to talk to PCP about anti depressant  return annually or prn

## 2019-05-13 LAB
HPV HR 12 DNA CVX QL NAA+PROBE: NEGATIVE
HPV16 AG SPEC QL: NEGATIVE
HPV18 DNA SPEC QL NAA+PROBE: NEGATIVE

## 2019-05-21 ENCOUNTER — TELEPHONE (OUTPATIENT)
Dept: OBSTETRICS AND GYNECOLOGY | Facility: CLINIC | Age: 54
End: 2019-05-21

## 2019-05-21 ENCOUNTER — OFFICE VISIT (OUTPATIENT)
Dept: INTERNAL MEDICINE | Facility: CLINIC | Age: 54
End: 2019-05-21
Attending: INTERNAL MEDICINE
Payer: COMMERCIAL

## 2019-05-21 VITALS
HEART RATE: 92 BPM | DIASTOLIC BLOOD PRESSURE: 80 MMHG | BODY MASS INDEX: 25.78 KG/M2 | SYSTOLIC BLOOD PRESSURE: 120 MMHG | HEIGHT: 63 IN | WEIGHT: 145.5 LBS | OXYGEN SATURATION: 98 %

## 2019-05-21 DIAGNOSIS — E04.1 THYROID NODULE: ICD-10-CM

## 2019-05-21 DIAGNOSIS — E78.00 PURE HYPERCHOLESTEROLEMIA: ICD-10-CM

## 2019-05-21 DIAGNOSIS — Z00.00 ANNUAL PHYSICAL EXAM: Primary | ICD-10-CM

## 2019-05-21 DIAGNOSIS — E55.9 VITAMIN D DEFICIENCY: ICD-10-CM

## 2019-05-21 PROCEDURE — 99396 PR PREVENTIVE VISIT,EST,40-64: ICD-10-PCS | Mod: S$GLB,,, | Performed by: INTERNAL MEDICINE

## 2019-05-21 PROCEDURE — 99396 PREV VISIT EST AGE 40-64: CPT | Mod: S$GLB,,, | Performed by: INTERNAL MEDICINE

## 2019-05-21 PROCEDURE — 99999 PR PBB SHADOW E&M-EST. PATIENT-LVL III: CPT | Mod: PBBFAC,,, | Performed by: INTERNAL MEDICINE

## 2019-05-21 PROCEDURE — 99999 PR PBB SHADOW E&M-EST. PATIENT-LVL III: ICD-10-PCS | Mod: PBBFAC,,, | Performed by: INTERNAL MEDICINE

## 2019-05-21 RX ORDER — ATORVASTATIN CALCIUM 20 MG/1
20 TABLET, FILM COATED ORAL DAILY
Qty: 30 TABLET | Refills: 3 | Status: SHIPPED | OUTPATIENT
Start: 2019-05-21 | End: 2019-06-10

## 2019-05-21 RX ORDER — SERTRALINE HYDROCHLORIDE 50 MG/1
50 TABLET, FILM COATED ORAL DAILY
Qty: 30 TABLET | Refills: 1 | Status: SHIPPED | OUTPATIENT
Start: 2019-05-21 | End: 2019-09-05 | Stop reason: SDUPTHER

## 2019-05-21 RX ORDER — VENLAFAXINE HYDROCHLORIDE 37.5 MG/1
37.5 CAPSULE, EXTENDED RELEASE ORAL DAILY
Qty: 10 CAPSULE | Refills: 0 | Status: SHIPPED | OUTPATIENT
Start: 2019-05-21 | End: 2020-03-05

## 2019-05-21 NOTE — PROGRESS NOTES
"Subjective:   Patient ID: Corinne Morrison Marcus is a 53 y.o. female  Chief complaint:   Chief Complaint   Patient presents with    Annual Exam       HPI  Pt here for annual exam     utd on gyn   - lmp march 2019  - Exercising - body pump and orange theory     Hypothyroidism: was on armour in past - not on medication in 6 months      Acne: started on aldactone by derm and tolerating     HLD: agrees to statin     Anxiety and depression: no si/hi/cabrallo  Taking effexor - feels irrtable if late for dose   Tried paroxetine and effective but c/f wieght gain and then switched to effexor   - discussed other med alternatives     Vit d def: taking otc vit d     Review of Systems    Objective:  Vitals:    05/21/19 1202   BP: 120/80   Pulse: 92   SpO2: 98%   Weight: 66 kg (145 lb 8.1 oz)   Height: 5' 3" (1.6 m)     Body mass index is 25.77 kg/m².    Physical Exam   Constitutional: She is oriented to person, place, and time. She appears well-developed and well-nourished.   HENT:   Head: Normocephalic and atraumatic.   Right Ear: External ear normal.   Left Ear: External ear normal.   Nose: Nose normal.   Mouth/Throat: Oropharynx is clear and moist. No oropharyngeal exudate.   No carotid bruits   Eyes: Conjunctivae and EOM are normal.   Neck: Neck supple. No thyromegaly present.   Cardiovascular: Normal rate, regular rhythm, normal heart sounds and intact distal pulses.   Pulmonary/Chest: Effort normal and breath sounds normal.   Abdominal: Soft. Bowel sounds are normal.   Musculoskeletal: She exhibits no edema or tenderness.   Lymphadenopathy:     She has no cervical adenopathy.   Neurological: She is alert and oriented to person, place, and time.   Skin: Skin is warm and dry.   Psychiatric: Her behavior is normal. Thought content normal.   Vitals reviewed.      Assessment:  1. Annual physical exam    2. Vitamin D deficiency    3. Pure hypercholesterolemia    4. Thyroid nodule        Plan:  Corinne was seen today for annual " exam.    Diagnoses and all orders for this visit:    Annual physical exam  -     Vitamin D; Future  -     Lipid panel; Future  -     Hepatic function panel; Future  -     Hemoglobin A1c; Future  -     TSH; Future  -     CBC auto differential; Future  -     Comprehensive metabolic panel; Future  Recommend daily sunscreen, cardiovascular exercise min 30 min 5 days per week. Seatbelts routinely.    Vitamin D deficiency  -     Vitamin D; Future  Cont otc vit d   Check level in 3 months     Pure hypercholesterolemia  -     Lipid panel; Future  -     Hepatic function panel; Future  Agrees to statin   Check labs in 3 months     Thyroid nodule  -     US Soft Tissue Head Neck Thyroid; Future    Other orders  -     atorvastatin (LIPITOR) 20 MG tablet; Take 1 tablet (20 mg total) by mouth once daily.  -     sertraline (ZOLOFT) 50 MG tablet; Take 1 tablet (50 mg total) by mouth once daily.  -     venlafaxine (EFFEXOR-XR) 37.5 MG 24 hr capsule; Take 1 capsule (37.5 mg total) by mouth once daily. For 10 days    Health Maintenance   Topic Date Due    TETANUS VACCINE  10/05/1983    Influenza Vaccine  08/01/2019    Mammogram  04/18/2021    Pap Smear with HPV Cotest  05/09/2022    Lipid Panel  08/02/2023    Colonoscopy  04/29/2025    Hepatitis C Screening  Completed

## 2019-05-21 NOTE — TELEPHONE ENCOUNTER
MD orders received for Rx to [facility], Estradiol 1mg, Progesterone 200mg and Testosterone 5mg, taking 1 tab po q day. MD orders read back and reviewed. Rx phoned to Chari Matos at [facility]. Patient informed regarding Rx and to call our office is any questions or concerns arise. Patient verbalized understanding. AMAURI Molina.

## 2019-05-29 ENCOUNTER — HOSPITAL ENCOUNTER (OUTPATIENT)
Dept: RADIOLOGY | Facility: OTHER | Age: 54
Discharge: HOME | End: 2019-05-29
Attending: INTERNAL MEDICINE
Payer: COMMERCIAL

## 2019-05-29 DIAGNOSIS — E04.1 THYROID NODULE: ICD-10-CM

## 2019-05-29 PROCEDURE — 76536 US EXAM OF HEAD AND NECK: CPT | Mod: 26,,, | Performed by: RADIOLOGY

## 2019-05-29 PROCEDURE — 76536 US SOFT TISSUE HEAD NECK THYROID: ICD-10-PCS | Mod: 26,,, | Performed by: RADIOLOGY

## 2019-05-29 PROCEDURE — 76536 US EXAM OF HEAD AND NECK: CPT | Mod: TC

## 2019-06-06 ENCOUNTER — PATIENT MESSAGE (OUTPATIENT)
Dept: INTERNAL MEDICINE | Facility: CLINIC | Age: 54
End: 2019-06-06

## 2019-06-07 ENCOUNTER — LAB VISIT (OUTPATIENT)
Dept: LAB | Facility: OTHER | Age: 54
End: 2019-06-07
Attending: INTERNAL MEDICINE
Payer: COMMERCIAL

## 2019-06-07 DIAGNOSIS — Z00.00 ANNUAL PHYSICAL EXAM: ICD-10-CM

## 2019-06-07 LAB
ALBUMIN SERPL BCP-MCNC: 3.7 G/DL (ref 3.5–5.2)
ALP SERPL-CCNC: 75 U/L (ref 55–135)
ALT SERPL W/O P-5'-P-CCNC: 15 U/L (ref 10–44)
ANION GAP SERPL CALC-SCNC: 9 MMOL/L (ref 8–16)
AST SERPL-CCNC: 22 U/L (ref 10–40)
BASOPHILS # BLD AUTO: 0.02 K/UL (ref 0–0.2)
BASOPHILS NFR BLD: 0.3 % (ref 0–1.9)
BILIRUB SERPL-MCNC: 0.8 MG/DL (ref 0.1–1)
BUN SERPL-MCNC: 17 MG/DL (ref 6–20)
CALCIUM SERPL-MCNC: 9.5 MG/DL (ref 8.7–10.5)
CHLORIDE SERPL-SCNC: 103 MMOL/L (ref 95–110)
CHOLEST SERPL-MCNC: 257 MG/DL (ref 120–199)
CHOLEST/HDLC SERPL: 3.2 {RATIO} (ref 2–5)
CO2 SERPL-SCNC: 24 MMOL/L (ref 23–29)
CREAT SERPL-MCNC: 0.9 MG/DL (ref 0.5–1.4)
DIFFERENTIAL METHOD: ABNORMAL
EOSINOPHIL # BLD AUTO: 0.1 K/UL (ref 0–0.5)
EOSINOPHIL NFR BLD: 1.1 % (ref 0–8)
ERYTHROCYTE [DISTWIDTH] IN BLOOD BY AUTOMATED COUNT: 13.6 % (ref 11.5–14.5)
EST. GFR  (AFRICAN AMERICAN): >60 ML/MIN/1.73 M^2
EST. GFR  (NON AFRICAN AMERICAN): >60 ML/MIN/1.73 M^2
ESTIMATED AVG GLUCOSE: 108 MG/DL (ref 68–131)
GLUCOSE SERPL-MCNC: 108 MG/DL (ref 70–110)
HBA1C MFR BLD HPLC: 5.4 % (ref 4–5.6)
HCT VFR BLD AUTO: 38.6 % (ref 37–48.5)
HDLC SERPL-MCNC: 81 MG/DL (ref 40–75)
HDLC SERPL: 31.5 % (ref 20–50)
HGB BLD-MCNC: 13.1 G/DL (ref 12–16)
LDLC SERPL CALC-MCNC: 156.8 MG/DL (ref 63–159)
LYMPHOCYTES # BLD AUTO: 1.9 K/UL (ref 1–4.8)
LYMPHOCYTES NFR BLD: 29 % (ref 18–48)
MCH RBC QN AUTO: 30.2 PG (ref 27–31)
MCHC RBC AUTO-ENTMCNC: 33.9 G/DL (ref 32–36)
MCV RBC AUTO: 89 FL (ref 82–98)
MONOCYTES # BLD AUTO: 0.9 K/UL (ref 0.3–1)
MONOCYTES NFR BLD: 13.4 % (ref 4–15)
NEUTROPHILS # BLD AUTO: 3.7 K/UL (ref 1.8–7.7)
NEUTROPHILS NFR BLD: 55.9 % (ref 38–73)
NONHDLC SERPL-MCNC: 176 MG/DL
PLATELET # BLD AUTO: 375 K/UL (ref 150–350)
PMV BLD AUTO: 8.8 FL (ref 9.2–12.9)
POTASSIUM SERPL-SCNC: 4.1 MMOL/L (ref 3.5–5.1)
PROT SERPL-MCNC: 7.5 G/DL (ref 6–8.4)
RBC # BLD AUTO: 4.34 M/UL (ref 4–5.4)
SODIUM SERPL-SCNC: 136 MMOL/L (ref 136–145)
TRIGL SERPL-MCNC: 96 MG/DL (ref 30–150)
TSH SERPL DL<=0.005 MIU/L-ACNC: 0.69 UIU/ML (ref 0.4–4)
WBC # BLD AUTO: 6.65 K/UL (ref 3.9–12.7)

## 2019-06-07 PROCEDURE — 80053 COMPREHEN METABOLIC PANEL: CPT

## 2019-06-07 PROCEDURE — 85025 COMPLETE CBC W/AUTO DIFF WBC: CPT

## 2019-06-07 PROCEDURE — 80061 LIPID PANEL: CPT

## 2019-06-07 PROCEDURE — 83036 HEMOGLOBIN GLYCOSYLATED A1C: CPT

## 2019-06-07 PROCEDURE — 36415 COLL VENOUS BLD VENIPUNCTURE: CPT

## 2019-06-07 PROCEDURE — 84443 ASSAY THYROID STIM HORMONE: CPT

## 2019-06-10 ENCOUNTER — TELEPHONE (OUTPATIENT)
Dept: INTERNAL MEDICINE | Facility: CLINIC | Age: 54
End: 2019-06-10

## 2019-06-10 DIAGNOSIS — D75.839 THROMBOCYTOSIS: Primary | ICD-10-CM

## 2019-06-10 RX ORDER — ATORVASTATIN CALCIUM 40 MG/1
40 TABLET, FILM COATED ORAL DAILY
Qty: 90 TABLET | Refills: 1 | Status: SHIPPED | OUTPATIENT
Start: 2019-06-10 | End: 2020-03-05 | Stop reason: SDUPTHER

## 2019-06-10 NOTE — TELEPHONE ENCOUNTER
Ms Mercado this is Nanci nurse for Dr. Fairbanks. She has ordered lab test lipid panel,liver function, ferritin and tibc for 6 weeks. I need a day and time to schedule these test.

## 2019-06-10 NOTE — TELEPHONE ENCOUNTER
Message sent to pt via my chart with lab results and updates to plan.     Please arrange flp and lft's in 6-8 weeks   Please add ferritin and tibc to labs in 6-8 weeks

## 2019-09-05 ENCOUNTER — PATIENT MESSAGE (OUTPATIENT)
Dept: INTERNAL MEDICINE | Facility: CLINIC | Age: 54
End: 2019-09-05

## 2019-09-05 DIAGNOSIS — F32.A DEPRESSION, UNSPECIFIED DEPRESSION TYPE: Primary | ICD-10-CM

## 2019-09-05 RX ORDER — SERTRALINE HYDROCHLORIDE 50 MG/1
50 TABLET, FILM COATED ORAL DAILY
Qty: 90 TABLET | Refills: 3 | Status: SHIPPED | OUTPATIENT
Start: 2019-09-05 | End: 2020-08-25 | Stop reason: SDUPTHER

## 2019-09-24 ENCOUNTER — PATIENT MESSAGE (OUTPATIENT)
Dept: INTERNAL MEDICINE | Facility: CLINIC | Age: 54
End: 2019-09-24

## 2019-09-24 ENCOUNTER — OFFICE VISIT (OUTPATIENT)
Dept: INTERNAL MEDICINE | Facility: CLINIC | Age: 54
End: 2019-09-24
Payer: COMMERCIAL

## 2019-09-24 VITALS
HEIGHT: 63 IN | BODY MASS INDEX: 25.27 KG/M2 | TEMPERATURE: 99 F | SYSTOLIC BLOOD PRESSURE: 134 MMHG | HEART RATE: 89 BPM | OXYGEN SATURATION: 98 % | DIASTOLIC BLOOD PRESSURE: 82 MMHG | WEIGHT: 142.63 LBS

## 2019-09-24 DIAGNOSIS — J02.9 SORE THROAT: Primary | ICD-10-CM

## 2019-09-24 DIAGNOSIS — R05.9 COUGH: Primary | ICD-10-CM

## 2019-09-24 DIAGNOSIS — R05.8 PRODUCTIVE COUGH: ICD-10-CM

## 2019-09-24 DIAGNOSIS — R09.89 CHEST CONGESTION: ICD-10-CM

## 2019-09-24 PROCEDURE — 99999 PR PBB SHADOW E&M-EST. PATIENT-LVL III: CPT | Mod: PBBFAC,,, | Performed by: NURSE PRACTITIONER

## 2019-09-24 PROCEDURE — 3008F BODY MASS INDEX DOCD: CPT | Mod: CPTII,S$GLB,, | Performed by: NURSE PRACTITIONER

## 2019-09-24 PROCEDURE — 99213 OFFICE O/P EST LOW 20 MIN: CPT | Mod: S$GLB,,, | Performed by: NURSE PRACTITIONER

## 2019-09-24 PROCEDURE — 99999 PR PBB SHADOW E&M-EST. PATIENT-LVL III: ICD-10-PCS | Mod: PBBFAC,,, | Performed by: NURSE PRACTITIONER

## 2019-09-24 PROCEDURE — 99213 PR OFFICE/OUTPT VISIT, EST, LEVL III, 20-29 MIN: ICD-10-PCS | Mod: S$GLB,,, | Performed by: NURSE PRACTITIONER

## 2019-09-24 PROCEDURE — 3008F PR BODY MASS INDEX (BMI) DOCUMENTED: ICD-10-PCS | Mod: CPTII,S$GLB,, | Performed by: NURSE PRACTITIONER

## 2019-09-24 PROCEDURE — 87081 CULTURE SCREEN ONLY: CPT

## 2019-09-24 NOTE — PROGRESS NOTES
"Subjective:       Patient ID: Corinne Morrison Marcus is a 53 y.o. female.    Chief Complaint: Sore Throat    HPI   Corinne Morrison Marcus is a 52 y/o CF who presents with about a one week h/o sore throat. She states "it feels like I have razors in my throat. Associated symptoms include chills, sweating, productive cough (thick, yellow), and chest congestion. She denies nasal congestion, sinus pressure/headache, rhinorrhea, or pnd. Treatments tried include Dayquil, Ibuprofen, and an unknown prescribed medicine for influenza. She reports that she was seen in an urgent care clinic last Thursday where she tested negative for the flu and also [facility].       Past Medical History: Patient has a past medical history of Abnormal Pap smear of cervix, Abnormal Pap smear of vagina, Anxiety, Constipation, Depression, Hemorrhoids, HLD (hyperlipidemia), Lung nodule, and Multinodular goiter (2010).    Past Surgical History: Patient has a past surgical history that includes Colposcopy (11/19/2014); Appendectomy; Colonoscopy; Excisional hemorrhoidectomy; and Total Reduction Mammoplasty.    Social History: Patient reports that she has never smoked. She has never used smokeless tobacco. She reports that she drinks alcohol. She reports that she does not use drugs.    Family History: family history includes Breast cancer (age of onset: 68) in her maternal grandmother; Cancer in her father; Hypertension in her mother; No Known Problems in her brother, daughter, daughter, son, and son; Other in her son.    Medications:   Current Outpatient Medications   Medication Sig    atorvastatin (LIPITOR) 40 MG tablet Take 1 tablet (40 mg total) by mouth once daily.    cetirizine (ZYRTEC) 5 MG tablet Take 5 mg by mouth once daily.    COMPOUND HORMONE REPLACEMENT Take by mouth once daily. Estradiol 1mg/Progesterone 200mg/Testosterone 5mg 1 tab po daily    fluticasone (FLONASE) 50 mcg/actuation nasal spray 1 spray by " "Each Nare route once daily.    sertraline (ZOLOFT) 50 MG tablet Take 1 tablet (50 mg total) by mouth once daily.    spironolactone (ALDACTONE) 100 MG tablet     venlafaxine (EFFEXOR-XR) 37.5 MG 24 hr capsule Take 1 capsule (37.5 mg total) by mouth once daily. For 10 days (Patient not taking: Reported on 9/24/2019)     No current facility-administered medications for this visit.        Allergies: Patient has No Known Allergies.    Review of Systems   Constitutional: Positive for chills and diaphoresis. Negative for activity change, appetite change, fatigue, fever and unexpected weight change.   HENT: Positive for sore throat. Negative for congestion, dental problem, ear discharge, ear pain, facial swelling, hearing loss, nosebleeds, postnasal drip, rhinorrhea, sinus pressure, sneezing, tinnitus, trouble swallowing and voice change.    Eyes: Negative for pain and visual disturbance.   Respiratory: Positive for cough. Negative for chest tightness, shortness of breath, wheezing and stridor.    Cardiovascular: Negative for chest pain.   Musculoskeletal: Negative for gait problem and neck pain.   Skin: Negative for color change and rash.   Allergic/Immunologic: Negative for environmental allergies.   Neurological: Negative for dizziness, seizures, syncope, facial asymmetry, speech difficulty, weakness, light-headedness, numbness and headaches.   Psychiatric/Behavioral: Negative for agitation and confusion. The patient is not nervous/anxious.        Objective:        /82 (BP Location: Left arm, Patient Position: Sitting)   Pulse 89   Temp 99.2 °F (37.3 °C)   Ht 5' 3" (1.6 m)   Wt 64.7 kg (142 lb 10.2 oz)   SpO2 98%   BMI 25.27 kg/m²     Physical Exam   Constitutional: She is oriented to person, place, and time. She appears well-developed and well-nourished.   HENT:   Head: Normocephalic and atraumatic. Not macrocephalic and not microcephalic. Head is without raccoon's eyes, without Kim's sign, without " abrasion, without contusion, without laceration, without right periorbital erythema and without left periorbital erythema. Hair is normal.   Right Ear: Tympanic membrane, external ear and ear canal normal. No lacerations. No drainage, swelling or tenderness. No foreign bodies. No mastoid tenderness. Tympanic membrane is not injected, not scarred, not perforated, not erythematous, not retracted and not bulging. Tympanic membrane mobility is normal. No middle ear effusion. No hemotympanum. No decreased hearing is noted.   Left Ear: Tympanic membrane, external ear and ear canal normal. No lacerations. No drainage, swelling or tenderness. No foreign bodies. No mastoid tenderness. Tympanic membrane is not injected, not scarred, not perforated, not erythematous, not retracted and not bulging. Tympanic membrane mobility is normal.  No middle ear effusion. No hemotympanum. No decreased hearing is noted.   Nose: Nose normal. No mucosal edema, rhinorrhea, nose lacerations, sinus tenderness, nasal deformity, septal deviation or nasal septal hematoma. No epistaxis.  No foreign bodies. Right sinus exhibits no maxillary sinus tenderness and no frontal sinus tenderness. Left sinus exhibits no maxillary sinus tenderness and no frontal sinus tenderness.   Mouth/Throat: Uvula is midline, oropharynx is clear and moist and mucous membranes are normal. Mucous membranes are not pale, not dry and not cyanotic. She does not have dentures. No oral lesions. No trismus in the jaw. Normal dentition. No dental abscesses, uvula swelling, lacerations or dental caries. No oropharyngeal exudate, posterior oropharyngeal edema, posterior oropharyngeal erythema or tonsillar abscesses. No tonsillar exudate.   Eyes: Conjunctivae and lids are normal. No scleral icterus.   Neck: Trachea normal. Neck supple. No spinous process tenderness and no muscular tenderness present. No neck rigidity. No edema, no erythema and normal range of motion present. No  thyroid mass and no thyromegaly present.   Cardiovascular: Normal rate, regular rhythm, normal heart sounds and intact distal pulses. Exam reveals no gallop and no friction rub.   No murmur heard.  Pulmonary/Chest: Effort normal. No stridor. No respiratory distress. She has no wheezes. She has rales. She exhibits no tenderness.   Abdominal: Soft. Bowel sounds are normal. She exhibits no distension.   Musculoskeletal: Normal range of motion.   Lymphadenopathy:        Head (right side): No submental, no submandibular, no tonsillar, no preauricular and no posterior auricular adenopathy present.        Head (left side): No submental, no submandibular, no tonsillar, no preauricular, no posterior auricular and no occipital adenopathy present.   Neurological: She is alert and oriented to person, place, and time.   Skin: Skin is warm and dry.   Psychiatric: She has a normal mood and affect. Her behavior is normal. Judgment and thought content normal.   Vitals reviewed.      Assessment:       1. Sore throat    2. Chest congestion    3. Productive cough        Plan:       Chest x-ray (r/o pneumonia).  Rapid strep (in office).  Strep culture (sent to lab).  Warm salt water gargles(1/2 teaspoon of salt to 1 cup warm water and gargle as desired).  Warm tea with honey.  Throat lozenges.  Fluids.  Rest.  OTC Tylenol prn.  Report to Emergency Department if symptoms worsen.  F/U with PCP x 1 week.  RTC prn.

## 2019-09-24 NOTE — PATIENT INSTRUCTIONS
Chest x-ray (r/o pneumonia).  Rapid strep (in office).  Strep culture (sent to lab).  Warm salt water gargles(1/2 teaspoon of salt to 1 cup warm water and gargle as desired).  Warm tea with honey.  Throat lozenges.  Fluids.  Rest.  OTC Tylenol prn.  Report to Emergency Department if symptoms worsen.  F/U with PCP x 1 week.  RTC prn.

## 2019-09-25 ENCOUNTER — TELEPHONE (OUTPATIENT)
Dept: INTERNAL MEDICINE | Facility: CLINIC | Age: 54
End: 2019-09-25

## 2019-09-25 ENCOUNTER — PATIENT MESSAGE (OUTPATIENT)
Dept: INTERNAL MEDICINE | Facility: CLINIC | Age: 54
End: 2019-09-25

## 2019-09-25 DIAGNOSIS — J18.9 COMMUNITY ACQUIRED PNEUMONIA, UNSPECIFIED LATERALITY: Primary | ICD-10-CM

## 2019-09-25 NOTE — TELEPHONE ENCOUNTER
----- Message from Adeline Johnson sent at 9/25/2019  4:29 PM CDT -----  Contact: MARCUS,CORINNE MORRISON [[id number]]   Name of Who is Calling: MARCUS,CORINNE MORRISON [[id number]]    What is the request in detail: patient request call back in reference to her care ..state she went to urgent care and was diginose with pneumonia   has medication and want to notify if she should take or what should she do  Please contact to further discuss and advise      Can the clinic reply by [facility]: n/a    What Number to Call Back if not [facility]:  [phone]

## 2019-09-25 NOTE — TELEPHONE ENCOUNTER
Called and spoke to pt   She is starting LQ - aware of no exercise  - will call radiology taras to review prior CT scans   - pt lifelong nonsmoker however 15 year 2nd hand exposure as parents smoked    All questions were answered and pt verbalized understanding of plan.

## 2019-09-25 NOTE — TELEPHONE ENCOUNTER
Called pta dn LVM stating that the staff was calling to scheduled cheat xray   Left office number for pt to call and schedule .

## 2019-09-25 NOTE — TELEPHONE ENCOUNTER
Chest x ray ordered - please arrange asap!    Strep culture preliminary result showed NO growth of strep - final is still pending but good news so far with this result

## 2019-09-25 NOTE — TELEPHONE ENCOUNTER
----- Message from Yanirayoyancy Ramírez sent at 9/25/2019 10:53 AM CDT -----  Contact: Pt   Name of Who is Calling: MARCUS,CORINNE MORRISON [[id number]]    What is the request in detail:EVIEDURGACORINNE BREEN [[id number]] is requesting a call back regards to getting her chest xray pt has no orders ......   Please contact to further discuss and advise      Can the clinic reply by [facility]: Yes     What Number to Call Back [facility] not [facility]:  [phone]

## 2019-09-26 LAB — BACTERIA THROAT CULT: NORMAL

## 2019-09-26 NOTE — TELEPHONE ENCOUNTER
Called pt and LVM stating that the staff was calling to scheduled chest xray for 3-4 weeks     Message sent via my chart

## 2019-09-26 NOTE — TELEPHONE ENCOUNTER
Called and spoke to pt   Reviewed cxr report from outside [facility] with radiology and reviewed prior ct scans - no evidence of emphysema on 2 prior ct scans - only known lung nodule    Pt james LQ    She will get CD of CXR pics and bring to office to be uploaded into epic - All questions were answered and pt verbalized understanding of plan.       Will order cxr to be completed in 3-4 weeks - please arrange in 3-4 weeks

## 2019-10-01 ENCOUNTER — PATIENT MESSAGE (OUTPATIENT)
Dept: INTERNAL MEDICINE | Facility: CLINIC | Age: 54
End: 2019-10-01

## 2019-10-04 ENCOUNTER — TELEPHONE (OUTPATIENT)
Dept: INTERNAL MEDICINE | Facility: CLINIC | Age: 54
End: 2019-10-04

## 2019-10-05 NOTE — TELEPHONE ENCOUNTER
Late entry:  Called and notified pt of issue with uploading the cxr results     Will contact her next week once this is hopefully uploaded and review with radiology  She is feeling markedly better

## 2019-10-30 ENCOUNTER — HOSPITAL ENCOUNTER (OUTPATIENT)
Dept: RADIOLOGY | Facility: OTHER | Age: 54
Discharge: HOME | End: 2019-10-30
Attending: INTERNAL MEDICINE
Payer: COMMERCIAL

## 2019-10-30 DIAGNOSIS — J18.9 COMMUNITY ACQUIRED PNEUMONIA, UNSPECIFIED LATERALITY: ICD-10-CM

## 2019-10-30 PROCEDURE — 71046 XR CHEST PA AND LATERAL: ICD-10-PCS | Mod: 26,,, | Performed by: RADIOLOGY

## 2019-10-30 PROCEDURE — 71046 X-RAY EXAM CHEST 2 VIEWS: CPT | Mod: 26,,, | Performed by: RADIOLOGY

## 2019-10-30 PROCEDURE — 71046 X-RAY EXAM CHEST 2 VIEWS: CPT | Mod: TC,FY

## 2019-11-04 ENCOUNTER — PATIENT MESSAGE (OUTPATIENT)
Dept: INTERNAL MEDICINE | Facility: CLINIC | Age: 54
End: 2019-11-04

## 2019-11-17 ENCOUNTER — PATIENT MESSAGE (OUTPATIENT)
Dept: INTERNAL MEDICINE | Facility: CLINIC | Age: 54
End: 2019-11-17

## 2019-11-18 ENCOUNTER — PATIENT MESSAGE (OUTPATIENT)
Dept: INTERNAL MEDICINE | Facility: CLINIC | Age: 54
End: 2019-11-18

## 2019-11-18 NOTE — TELEPHONE ENCOUNTER
Please let pt know that unfortunately I am unable to take on new patients at this time and am sorry that I cannot accommodate them at this time - however I recommend Jessie [facility] and [facility] without reservation

## 2019-12-02 ENCOUNTER — PATIENT MESSAGE (OUTPATIENT)
Dept: OBSTETRICS AND GYNECOLOGY | Facility: CLINIC | Age: 54
End: 2019-12-02

## 2019-12-02 ENCOUNTER — TELEPHONE (OUTPATIENT)
Dept: OBSTETRICS AND GYNECOLOGY | Facility: CLINIC | Age: 54
End: 2019-12-02

## 2019-12-02 DIAGNOSIS — N95.1 MENOPAUSAL SYMPTOMS: Primary | ICD-10-CM

## 2019-12-03 ENCOUNTER — PATIENT MESSAGE (OUTPATIENT)
Dept: OBSTETRICS AND GYNECOLOGY | Facility: CLINIC | Age: 54
End: 2019-12-03

## 2019-12-03 ENCOUNTER — LAB VISIT (OUTPATIENT)
Dept: LAB | Facility: OTHER | Age: 54
End: 2019-12-03
Attending: OBSTETRICS & GYNECOLOGY
Payer: COMMERCIAL

## 2019-12-03 ENCOUNTER — TELEPHONE (OUTPATIENT)
Dept: OBSTETRICS AND GYNECOLOGY | Facility: CLINIC | Age: 54
End: 2019-12-03

## 2019-12-03 DIAGNOSIS — Z13.220 SCREENING FOR HYPERLIPIDEMIA: Primary | ICD-10-CM

## 2019-12-03 DIAGNOSIS — N95.1 MENOPAUSAL SYMPTOMS: ICD-10-CM

## 2019-12-03 DIAGNOSIS — Z13.220 SCREENING FOR HYPERLIPIDEMIA: ICD-10-CM

## 2019-12-03 LAB
CHOLEST SERPL-MCNC: 212 MG/DL (ref 120–199)
CHOLEST/HDLC SERPL: 2.8 {RATIO} (ref 2–5)
HDLC SERPL-MCNC: 75 MG/DL (ref 40–75)
HDLC SERPL: 35.4 % (ref 20–50)
LDLC SERPL CALC-MCNC: 122.4 MG/DL (ref 63–159)
NONHDLC SERPL-MCNC: 137 MG/DL
TRIGL SERPL-MCNC: 73 MG/DL (ref 30–150)

## 2019-12-03 PROCEDURE — 82670 ASSAY OF TOTAL ESTRADIOL: CPT

## 2019-12-03 PROCEDURE — 36415 COLL VENOUS BLD VENIPUNCTURE: CPT

## 2019-12-03 PROCEDURE — 80061 LIPID PANEL: CPT

## 2019-12-03 PROCEDURE — 84402 ASSAY OF FREE TESTOSTERONE: CPT

## 2019-12-03 PROCEDURE — 83001 ASSAY OF GONADOTROPIN (FSH): CPT

## 2019-12-04 LAB
ESTRADIOL SERPL-MCNC: 28 PG/ML
FSH SERPL-ACNC: 51.6 MIU/ML

## 2019-12-06 LAB — TESTOST FREE SERPL-MCNC: 1.1 PG/ML

## 2020-03-04 NOTE — PROGRESS NOTES
"Subjective:       Patient ID: Corinne Morrison Marcus is a 54 y.o. female.    Chief Complaint: Establish Care    HPI  This patient is new to me.   Corinne Morrison Marcus is a 54 y.o. year old female with HLD, stress incontinence, thyroid nodule, lung nodule (stable, no need for follow-up), anxiety and depression who presents today to establish care.    HLD - compliant with atorvastatin 40 mg daily  Lab Results   Component Value Date    CHOL 212 (H) 12/03/2019    CHOL 257 (H) 06/07/2019    CHOL 298 (H) 08/02/2018     Lab Results   Component Value Date    HDL 75 12/03/2019    HDL 81 (H) 06/07/2019    HDL 95 (H) 08/02/2018     Lab Results   Component Value Date    LDLCALC 122.4 12/03/2019    LDLCALC 156.8 06/07/2019    LDLCALC 180.2 (H) 08/02/2018     Lab Results   Component Value Date    TRIG 73 12/03/2019    TRIG 96 06/07/2019    TRIG 114 08/02/2018     Lab Results   Component Value Date    CHOLHDL 35.4 12/03/2019    CHOLHDL 31.5 06/07/2019    CHOLHDL 31.9 08/02/2018     The 10-year ASCVD risk score (Smackoverbhavya PARHAM Jr., et al., 2013) is: 1.3%    Values used to calculate the score:      Age: 54 years      Sex: Female      Is Non- : No      Diabetic: No      Tobacco smoker: No      Systolic Blood Pressure: 124 mmHg      Is BP treated: No      HDL Cholesterol: 75 mg/dL      Total Cholesterol: 212 mg/dL    Thyroid nodules - Thyroid US 5/29/19, "Multiple bilateral thyroid nodules similar to prior exam.  Several of the nodules are TI-RADS 4, warranting follow-up but not FNA per ACR guidelines."    Lung nodule - CT chest 5/9/19, "Stable appearance of the noncalcified pulmonary nodule within the right lower lobe laterally dating back to a CT examination of the abdomen and pelvis dated 06/16/2016.  Given the stability over this time interval, this is consistent with a benign process and no further follow-up is indicated. No new pulmonary nodules are identified."    Anxiety and depression - compliant with " "Zoloft 50 mg daily. Symptoms controlled.    Patient takes spironolactone 100 mg daily for acne.     Patient reports her symptoms of stress incontinence are uncomfortable and annoying. She would like to discuss possible treatment options.     OB/GYN History     LMP: 2020, patient thought she had gone through menopause (she will discuss with gyn!)    Health Maintenance  Pap smear: 19 - normal   Mammogram: 2019 - benign   Colon Cancer Screening: colonoscopy 4/29/15 - at [facility]. Repeat in 10 years  DEXA: n/a  Hepatitis C screening: negative   Flu vaccine: due  Tetanus vaccine: due  PNA vaccine: n/a  Shingles vaccine: due    I personally reviewed Past Medical History, Past Surgical History, Social History, and Family History    Review of Systems   Constitutional: Negative for chills, fatigue, fever and unexpected weight change.   HENT: Negative for congestion, hearing loss, rhinorrhea and sore throat.    Eyes: Negative for visual disturbance.   Respiratory: Negative for cough, shortness of breath and wheezing.    Cardiovascular: Negative for chest pain, palpitations and leg swelling.   Gastrointestinal: Negative for abdominal pain, constipation, diarrhea, nausea and vomiting.   Genitourinary: Positive for menstrual problem. Negative for dysuria, frequency and urgency.        +stress urinary incontinence    Musculoskeletal: Negative for arthralgias and myalgias.   Skin: Negative for rash.   Neurological: Negative for dizziness, syncope and headaches.   Psychiatric/Behavioral: Negative for dysphoric mood and sleep disturbance. The patient is not nervous/anxious.        Objective:      Vitals:    20 1237   BP: 124/78   Pulse: 70   SpO2: 99%   Weight: 64.1 kg (141 lb 5 oz)   Height: 5' 3" (1.6 m)     Physical Exam   Constitutional: She is oriented to person, place, and time. She appears well-developed and well-nourished. No distress.   HENT:   Head: Normocephalic and atraumatic.   Right Ear: Hearing " normal.   Left Ear: Hearing normal.   Nose: Nose normal.   Mouth/Throat: Oropharynx is clear and moist and mucous membranes are normal. No oropharyngeal exudate.   Eyes: Pupils are equal, round, and reactive to light. Conjunctivae and lids are normal.   Neck: Normal range of motion. No thyroid mass and no thyromegaly present.   Cardiovascular: Normal rate, regular rhythm, S1 normal, S2 normal and intact distal pulses.   No murmur heard.  No lower extremity edema.    Pulmonary/Chest: Effort normal and breath sounds normal. No respiratory distress.   Abdominal: Soft. Normal appearance and bowel sounds are normal. There is no tenderness.   Lymphadenopathy:     She has no cervical adenopathy.        Right: No supraclavicular adenopathy present.        Left: No supraclavicular adenopathy present.   Neurological: She is alert and oriented to person, place, and time.   Skin: Skin is warm and dry. No rash noted.   Psychiatric: She has a normal mood and affect. Her behavior is normal. Thought content normal.   Nursing note and vitals reviewed.      Assessment:       1. Annual physical exam    2. Screening for diabetes mellitus    3. Hypercholesterolemia    4. YELITZA (stress urinary incontinence, female)    5. Multiple thyroid nodules    6. Anxiety and depression    7. Solitary lung nodule        Plan:     Corinne was seen today for establish care.    Diagnoses and all orders for this visit:    Annual physical exam  Recommended flu, Tdap, and Shingles vaccines   -     CBC auto differential; Future  -     Comprehensive metabolic panel; Future  -     Lipid panel; Future  -     Hemoglobin A1c; Future    Screening for diabetes mellitus  -     Comprehensive metabolic panel; Future  -     Hemoglobin A1c; Future    Hypercholesterolemia  Continue statin at same dose. Continue healthy lifestyle changes.   -     atorvastatin (LIPITOR) 40 MG tablet; Take 1 tablet (40 mg total) by mouth once daily.  -     Comprehensive metabolic panel;  Future  -     Lipid panel; Future    YELITZA (stress urinary incontinence, female)  -     Ambulatory referral/consult to Urogynecology; Future    Multiple thyroid nodules  Will discuss repeating US at next visit. Plan to repeat after 1 year May 2020    Anxiety and depression  Controlled. Continue current medication regimen.     Solitary lung nodule  Stable and no further imaging necessary.

## 2020-03-05 ENCOUNTER — OFFICE VISIT (OUTPATIENT)
Dept: INTERNAL MEDICINE | Facility: CLINIC | Age: 55
End: 2020-03-05
Payer: COMMERCIAL

## 2020-03-05 VITALS
SYSTOLIC BLOOD PRESSURE: 124 MMHG | DIASTOLIC BLOOD PRESSURE: 78 MMHG | HEIGHT: 63 IN | WEIGHT: 141.31 LBS | OXYGEN SATURATION: 99 % | HEART RATE: 70 BPM | BODY MASS INDEX: 25.04 KG/M2

## 2020-03-05 DIAGNOSIS — Z13.1 SCREENING FOR DIABETES MELLITUS: ICD-10-CM

## 2020-03-05 DIAGNOSIS — Z00.00 ANNUAL PHYSICAL EXAM: Primary | ICD-10-CM

## 2020-03-05 DIAGNOSIS — R91.1 SOLITARY LUNG NODULE: ICD-10-CM

## 2020-03-05 DIAGNOSIS — E78.00 HYPERCHOLESTEROLEMIA: ICD-10-CM

## 2020-03-05 DIAGNOSIS — E04.2 MULTIPLE THYROID NODULES: ICD-10-CM

## 2020-03-05 DIAGNOSIS — F32.A ANXIETY AND DEPRESSION: ICD-10-CM

## 2020-03-05 DIAGNOSIS — F41.9 ANXIETY AND DEPRESSION: ICD-10-CM

## 2020-03-05 DIAGNOSIS — N39.3 SUI (STRESS URINARY INCONTINENCE, FEMALE): ICD-10-CM

## 2020-03-05 PROCEDURE — 99396 PR PREVENTIVE VISIT,EST,40-64: ICD-10-PCS | Mod: S$GLB,,, | Performed by: FAMILY MEDICINE

## 2020-03-05 PROCEDURE — 99999 PR PBB SHADOW E&M-EST. PATIENT-LVL III: CPT | Mod: PBBFAC,,, | Performed by: FAMILY MEDICINE

## 2020-03-05 PROCEDURE — 99999 PR PBB SHADOW E&M-EST. PATIENT-LVL III: ICD-10-PCS | Mod: PBBFAC,,, | Performed by: FAMILY MEDICINE

## 2020-03-05 PROCEDURE — 99396 PREV VISIT EST AGE 40-64: CPT | Mod: S$GLB,,, | Performed by: FAMILY MEDICINE

## 2020-03-05 RX ORDER — ATORVASTATIN CALCIUM 40 MG/1
40 TABLET, FILM COATED ORAL DAILY
Qty: 90 TABLET | Refills: 4 | Status: SHIPPED | OUTPATIENT
Start: 2020-03-05 | End: 2021-03-11 | Stop reason: SDUPTHER

## 2020-03-12 ENCOUNTER — PATIENT OUTREACH (OUTPATIENT)
Dept: ADMINISTRATIVE | Facility: OTHER | Age: 55
End: 2020-03-12

## 2020-03-16 ENCOUNTER — TELEPHONE (OUTPATIENT)
Dept: INTERNAL MEDICINE | Facility: CLINIC | Age: 55
End: 2020-03-16

## 2020-05-07 ENCOUNTER — TELEPHONE (OUTPATIENT)
Dept: OBSTETRICS AND GYNECOLOGY | Facility: CLINIC | Age: 55
End: 2020-05-07

## 2020-05-07 ENCOUNTER — PATIENT MESSAGE (OUTPATIENT)
Dept: OBSTETRICS AND GYNECOLOGY | Facility: CLINIC | Age: 55
End: 2020-05-07

## 2020-05-07 DIAGNOSIS — Z12.31 ENCOUNTER FOR SCREENING MAMMOGRAM FOR MALIGNANT NEOPLASM OF BREAST: Primary | ICD-10-CM

## 2020-06-08 ENCOUNTER — HOSPITAL ENCOUNTER (OUTPATIENT)
Dept: RADIOLOGY | Facility: OTHER | Age: 55
Discharge: HOME | End: 2020-06-08
Attending: OBSTETRICS & GYNECOLOGY
Payer: COMMERCIAL

## 2020-06-08 DIAGNOSIS — Z12.31 ENCOUNTER FOR SCREENING MAMMOGRAM FOR MALIGNANT NEOPLASM OF BREAST: ICD-10-CM

## 2020-06-08 PROCEDURE — 77063 MAMMO DIGITAL SCREENING BILAT WITH TOMOSYNTHESIS_CAD: ICD-10-PCS | Mod: 26,,, | Performed by: RADIOLOGY

## 2020-06-08 PROCEDURE — 77063 BREAST TOMOSYNTHESIS BI: CPT | Mod: 26,,, | Performed by: RADIOLOGY

## 2020-06-08 PROCEDURE — 77067 SCR MAMMO BI INCL CAD: CPT | Mod: 26,,, | Performed by: RADIOLOGY

## 2020-06-08 PROCEDURE — 77067 SCR MAMMO BI INCL CAD: CPT | Mod: TC

## 2020-06-08 PROCEDURE — 77067 MAMMO DIGITAL SCREENING BILAT WITH TOMOSYNTHESIS_CAD: ICD-10-PCS | Mod: 26,,, | Performed by: RADIOLOGY

## 2020-07-20 ENCOUNTER — LAB VISIT (OUTPATIENT)
Dept: PRIMARY CARE CLINIC | Facility: CLINIC | Age: 55
End: 2020-07-20
Payer: COMMERCIAL

## 2020-07-20 DIAGNOSIS — U07.1 COVID-19 VIRUS DETECTED: ICD-10-CM

## 2020-07-20 DIAGNOSIS — Z20.822 CLOSE EXPOSURE TO COVID-19 VIRUS: ICD-10-CM

## 2020-07-20 DIAGNOSIS — Z20.822 CLOSE EXPOSURE TO COVID-19 VIRUS: Primary | ICD-10-CM

## 2020-07-20 PROCEDURE — U0003 INFECTIOUS AGENT DETECTION BY NUCLEIC ACID (DNA OR RNA); SEVERE ACUTE RESPIRATORY SYNDROME CORONAVIRUS 2 (SARS-COV-2) (CORONAVIRUS DISEASE [COVID-19]), AMPLIFIED PROBE TECHNIQUE, MAKING USE OF HIGH THROUGHPUT TECHNOLOGIES AS DESCRIBED BY CMS-2020-01-R: HCPCS

## 2020-07-22 LAB — SARS-COV-2 RNA RESP QL NAA+PROBE: DETECTED

## 2020-07-27 ENCOUNTER — TELEPHONE (OUTPATIENT)
Dept: OBSTETRICS AND GYNECOLOGY | Facility: CLINIC | Age: 55
End: 2020-07-27

## 2020-07-27 NOTE — TELEPHONE ENCOUNTER
----- Message from Amber Tim sent at 7/27/2020 12:17 PM CDT -----  Regarding: medications  Type: Patient Call Back    Who called:pt    What is the request in detail:has questions in regards to a prescription. Call pt    Can the clinic reply by [facility]?    Would the patient rather a call back or a response via [facility]? call    Best call back number:[phone] (home) [phone]      Additional Information

## 2020-07-28 ENCOUNTER — TELEPHONE (OUTPATIENT)
Dept: OBSTETRICS AND GYNECOLOGY | Facility: CLINIC | Age: 55
End: 2020-07-28

## 2020-07-28 NOTE — TELEPHONE ENCOUNTER
----- Message from Adeline Johnson sent at 7/28/2020 12:14 PM CDT -----  Type:  Patient Returning Call    Who Called:     Who Left Message for Patient:     Does the patient know what this is regarding?: missed call     Best Call Back Number: [phone]     Additional Information:  n/a

## 2020-07-28 NOTE — TELEPHONE ENCOUNTER
Pt states she was calling to see if the prescriptions for her daughter was sent in. Advised pt es they were

## 2020-11-29 ENCOUNTER — PATIENT OUTREACH (OUTPATIENT)
Dept: ADMINISTRATIVE | Facility: OTHER | Age: 55
End: 2020-11-29

## 2020-11-30 ENCOUNTER — LAB VISIT (OUTPATIENT)
Dept: LAB | Facility: OTHER | Age: 55
End: 2020-11-30
Attending: FAMILY MEDICINE
Payer: COMMERCIAL

## 2020-11-30 ENCOUNTER — OFFICE VISIT (OUTPATIENT)
Dept: OBSTETRICS AND GYNECOLOGY | Facility: CLINIC | Age: 55
End: 2020-11-30
Attending: OBSTETRICS & GYNECOLOGY
Payer: COMMERCIAL

## 2020-11-30 VITALS
SYSTOLIC BLOOD PRESSURE: 110 MMHG | BODY MASS INDEX: 25.16 KG/M2 | HEIGHT: 63 IN | DIASTOLIC BLOOD PRESSURE: 68 MMHG | WEIGHT: 142 LBS

## 2020-11-30 DIAGNOSIS — Z12.4 SCREENING FOR MALIGNANT NEOPLASM OF THE CERVIX: ICD-10-CM

## 2020-11-30 DIAGNOSIS — Z13.220 SCREENING FOR CHOLESTEROL LEVEL: ICD-10-CM

## 2020-11-30 DIAGNOSIS — N95.1 MENOPAUSAL SYMPTOM: Primary | ICD-10-CM

## 2020-11-30 DIAGNOSIS — N95.1 MENOPAUSAL SYMPTOM: ICD-10-CM

## 2020-11-30 DIAGNOSIS — Z00.00 WELLNESS EXAMINATION: ICD-10-CM

## 2020-11-30 DIAGNOSIS — E78.00 HYPERCHOLESTEROLEMIA: ICD-10-CM

## 2020-11-30 DIAGNOSIS — Z13.1 SCREENING FOR DIABETES MELLITUS: ICD-10-CM

## 2020-11-30 DIAGNOSIS — Z00.00 ANNUAL PHYSICAL EXAM: ICD-10-CM

## 2020-11-30 LAB
ALBUMIN SERPL BCP-MCNC: 4.2 G/DL (ref 3.5–5.2)
ALBUMIN SERPL BCP-MCNC: 4.2 G/DL (ref 3.5–5.2)
ALP SERPL-CCNC: 69 U/L (ref 55–135)
ALP SERPL-CCNC: 69 U/L (ref 55–135)
ALT SERPL W/O P-5'-P-CCNC: 23 U/L (ref 10–44)
ALT SERPL W/O P-5'-P-CCNC: 23 U/L (ref 10–44)
ANION GAP SERPL CALC-SCNC: 10 MMOL/L (ref 8–16)
ANION GAP SERPL CALC-SCNC: 10 MMOL/L (ref 8–16)
AST SERPL-CCNC: 25 U/L (ref 10–40)
AST SERPL-CCNC: 25 U/L (ref 10–40)
BASOPHILS # BLD AUTO: 0.03 K/UL (ref 0–0.2)
BASOPHILS # BLD AUTO: 0.03 K/UL (ref 0–0.2)
BASOPHILS NFR BLD: 0.5 % (ref 0–1.9)
BASOPHILS NFR BLD: 0.5 % (ref 0–1.9)
BILIRUB SERPL-MCNC: 0.5 MG/DL (ref 0.1–1)
BILIRUB SERPL-MCNC: 0.5 MG/DL (ref 0.1–1)
BUN SERPL-MCNC: 15 MG/DL (ref 6–20)
BUN SERPL-MCNC: 15 MG/DL (ref 6–20)
CALCIUM SERPL-MCNC: 10 MG/DL (ref 8.7–10.5)
CALCIUM SERPL-MCNC: 10 MG/DL (ref 8.7–10.5)
CHLORIDE SERPL-SCNC: 103 MMOL/L (ref 95–110)
CHLORIDE SERPL-SCNC: 103 MMOL/L (ref 95–110)
CHOLEST SERPL-MCNC: 199 MG/DL (ref 120–199)
CHOLEST SERPL-MCNC: 199 MG/DL (ref 120–199)
CHOLEST/HDLC SERPL: 2.8 {RATIO} (ref 2–5)
CHOLEST/HDLC SERPL: 2.8 {RATIO} (ref 2–5)
CO2 SERPL-SCNC: 26 MMOL/L (ref 23–29)
CO2 SERPL-SCNC: 26 MMOL/L (ref 23–29)
CREAT SERPL-MCNC: 0.8 MG/DL (ref 0.5–1.4)
CREAT SERPL-MCNC: 0.8 MG/DL (ref 0.5–1.4)
DIFFERENTIAL METHOD: NORMAL
DIFFERENTIAL METHOD: NORMAL
EOSINOPHIL # BLD AUTO: 0.1 K/UL (ref 0–0.5)
EOSINOPHIL # BLD AUTO: 0.1 K/UL (ref 0–0.5)
EOSINOPHIL NFR BLD: 1.1 % (ref 0–8)
EOSINOPHIL NFR BLD: 1.1 % (ref 0–8)
ERYTHROCYTE [DISTWIDTH] IN BLOOD BY AUTOMATED COUNT: 12.9 % (ref 11.5–14.5)
ERYTHROCYTE [DISTWIDTH] IN BLOOD BY AUTOMATED COUNT: 12.9 % (ref 11.5–14.5)
EST. GFR  (AFRICAN AMERICAN): >60 ML/MIN/1.73 M^2
EST. GFR  (AFRICAN AMERICAN): >60 ML/MIN/1.73 M^2
EST. GFR  (NON AFRICAN AMERICAN): >60 ML/MIN/1.73 M^2
EST. GFR  (NON AFRICAN AMERICAN): >60 ML/MIN/1.73 M^2
ESTIMATED AVG GLUCOSE: 103 MG/DL (ref 68–131)
ESTIMATED AVG GLUCOSE: 103 MG/DL (ref 68–131)
ESTRADIOL SERPL-MCNC: 73 PG/ML
FSH SERPL-ACNC: 68.9 MIU/ML
GLUCOSE SERPL-MCNC: 101 MG/DL (ref 70–110)
GLUCOSE SERPL-MCNC: 101 MG/DL (ref 70–110)
HBA1C MFR BLD HPLC: 5.2 % (ref 4–5.6)
HBA1C MFR BLD HPLC: 5.2 % (ref 4–5.6)
HCT VFR BLD AUTO: 40.6 % (ref 37–48.5)
HCT VFR BLD AUTO: 40.6 % (ref 37–48.5)
HDLC SERPL-MCNC: 72 MG/DL (ref 40–75)
HDLC SERPL-MCNC: 72 MG/DL (ref 40–75)
HDLC SERPL: 36.2 % (ref 20–50)
HDLC SERPL: 36.2 % (ref 20–50)
HGB BLD-MCNC: 13.6 G/DL (ref 12–16)
HGB BLD-MCNC: 13.6 G/DL (ref 12–16)
IMM GRANULOCYTES # BLD AUTO: 0.02 K/UL (ref 0–0.04)
IMM GRANULOCYTES # BLD AUTO: 0.02 K/UL (ref 0–0.04)
IMM GRANULOCYTES NFR BLD AUTO: 0.3 % (ref 0–0.5)
IMM GRANULOCYTES NFR BLD AUTO: 0.3 % (ref 0–0.5)
LDLC SERPL CALC-MCNC: 114.8 MG/DL (ref 63–159)
LDLC SERPL CALC-MCNC: 114.8 MG/DL (ref 63–159)
LYMPHOCYTES # BLD AUTO: 1.6 K/UL (ref 1–4.8)
LYMPHOCYTES # BLD AUTO: 1.6 K/UL (ref 1–4.8)
LYMPHOCYTES NFR BLD: 25.9 % (ref 18–48)
LYMPHOCYTES NFR BLD: 25.9 % (ref 18–48)
MCH RBC QN AUTO: 29.9 PG (ref 27–31)
MCH RBC QN AUTO: 29.9 PG (ref 27–31)
MCHC RBC AUTO-ENTMCNC: 33.5 G/DL (ref 32–36)
MCHC RBC AUTO-ENTMCNC: 33.5 G/DL (ref 32–36)
MCV RBC AUTO: 89 FL (ref 82–98)
MCV RBC AUTO: 89 FL (ref 82–98)
MONOCYTES # BLD AUTO: 0.6 K/UL (ref 0.3–1)
MONOCYTES # BLD AUTO: 0.6 K/UL (ref 0.3–1)
MONOCYTES NFR BLD: 10.1 % (ref 4–15)
MONOCYTES NFR BLD: 10.1 % (ref 4–15)
NEUTROPHILS # BLD AUTO: 3.8 K/UL (ref 1.8–7.7)
NEUTROPHILS # BLD AUTO: 3.8 K/UL (ref 1.8–7.7)
NEUTROPHILS NFR BLD: 62.1 % (ref 38–73)
NEUTROPHILS NFR BLD: 62.1 % (ref 38–73)
NONHDLC SERPL-MCNC: 127 MG/DL
NONHDLC SERPL-MCNC: 127 MG/DL
NRBC BLD-RTO: 0 /100 WBC
NRBC BLD-RTO: 0 /100 WBC
PLATELET # BLD AUTO: 346 K/UL (ref 150–350)
PLATELET # BLD AUTO: 346 K/UL (ref 150–350)
PMV BLD AUTO: 9.3 FL (ref 9.2–12.9)
PMV BLD AUTO: 9.3 FL (ref 9.2–12.9)
POTASSIUM SERPL-SCNC: 4.6 MMOL/L (ref 3.5–5.1)
POTASSIUM SERPL-SCNC: 4.6 MMOL/L (ref 3.5–5.1)
PROT SERPL-MCNC: 7.6 G/DL (ref 6–8.4)
PROT SERPL-MCNC: 7.6 G/DL (ref 6–8.4)
RBC # BLD AUTO: 4.55 M/UL (ref 4–5.4)
RBC # BLD AUTO: 4.55 M/UL (ref 4–5.4)
SODIUM SERPL-SCNC: 139 MMOL/L (ref 136–145)
SODIUM SERPL-SCNC: 139 MMOL/L (ref 136–145)
TRIGL SERPL-MCNC: 61 MG/DL (ref 30–150)
TRIGL SERPL-MCNC: 61 MG/DL (ref 30–150)
TSH SERPL DL<=0.005 MIU/L-ACNC: 0.54 UIU/ML (ref 0.4–4)
WBC # BLD AUTO: 6.11 K/UL (ref 3.9–12.7)
WBC # BLD AUTO: 6.11 K/UL (ref 3.9–12.7)

## 2020-11-30 PROCEDURE — 83036 HEMOGLOBIN GLYCOSYLATED A1C: CPT

## 2020-11-30 PROCEDURE — 85025 COMPLETE CBC W/AUTO DIFF WBC: CPT

## 2020-11-30 PROCEDURE — 80061 LIPID PANEL: CPT

## 2020-11-30 PROCEDURE — 82670 ASSAY OF TOTAL ESTRADIOL: CPT

## 2020-11-30 PROCEDURE — 80053 COMPREHEN METABOLIC PANEL: CPT

## 2020-11-30 PROCEDURE — 3008F BODY MASS INDEX DOCD: CPT | Mod: CPTII,S$GLB,, | Performed by: OBSTETRICS & GYNECOLOGY

## 2020-11-30 PROCEDURE — 87624 HPV HI-RISK TYP POOLED RSLT: CPT

## 2020-11-30 PROCEDURE — 99396 PREV VISIT EST AGE 40-64: CPT | Mod: S$GLB,,, | Performed by: OBSTETRICS & GYNECOLOGY

## 2020-11-30 PROCEDURE — 3008F PR BODY MASS INDEX (BMI) DOCUMENTED: ICD-10-PCS | Mod: CPTII,S$GLB,, | Performed by: OBSTETRICS & GYNECOLOGY

## 2020-11-30 PROCEDURE — 1126F PR PAIN SEVERITY QUANTIFIED, NO PAIN PRESENT: ICD-10-PCS | Mod: S$GLB,,, | Performed by: OBSTETRICS & GYNECOLOGY

## 2020-11-30 PROCEDURE — 99396 PR PREVENTIVE VISIT,EST,40-64: ICD-10-PCS | Mod: S$GLB,,, | Performed by: OBSTETRICS & GYNECOLOGY

## 2020-11-30 PROCEDURE — 1126F AMNT PAIN NOTED NONE PRSNT: CPT | Mod: S$GLB,,, | Performed by: OBSTETRICS & GYNECOLOGY

## 2020-11-30 PROCEDURE — 84443 ASSAY THYROID STIM HORMONE: CPT

## 2020-11-30 PROCEDURE — 84402 ASSAY OF FREE TESTOSTERONE: CPT

## 2020-11-30 PROCEDURE — 83001 ASSAY OF GONADOTROPIN (FSH): CPT

## 2020-11-30 PROCEDURE — 88142 CYTOPATH C/V THIN LAYER: CPT

## 2020-11-30 RX ORDER — NALTREXONE HYDROCHLORIDE 50 MG/1
TABLET, FILM COATED ORAL
COMMUNITY
Start: 2020-11-17 | End: 2021-11-08

## 2020-11-30 NOTE — PROGRESS NOTES
SUBJECTIVE:   55 y.o. female   for annual routine Pap and checkup. No LMP recorded. Patient is perimenopausal..  She has no unusual complaints and reports that she is doing well off of hormone replacement therapy.        Past Medical History:   Diagnosis Date    Abnormal Pap smear of cervix     Abnormal Pap smear of vagina     Anxiety     Constipation     Depression     Hemorrhoids     HLD (hyperlipidemia)     Lung nodule     Multinodular goiter     s/p bx of 2 nodules in  - benign     Past Surgical History:   Procedure Laterality Date    APPENDECTOMY      COLONOSCOPY      4 as of  [facility], next due in     COLPOSCOPY  2014    EXCISIONAL HEMORRHOIDECTOMY      TOTAL REDUCTION MAMMOPLASTY       Social History     Socioeconomic History    Marital status:      Spouse name: Not on file    Number of children: 5    Years of education: Not on file    Highest education level: Not on file   Occupational History    Occupation:      Comment:  - not practicing   Social Needs    Financial resource strain: Not on file    Food insecurity     Worry: Not on file     Inability: Not on file    Transportation needs     Medical: Not on file     Non-medical: Not on file   Tobacco Use    Smoking status: Never Smoker    Smokeless tobacco: Never Used   Substance and Sexual Activity    Alcohol use: Yes     Frequency: 2-3 times a week     Drinks per session: 1 or 2    Drug use: No    Sexual activity: Yes     Partners: Male     Birth control/protection: None   Lifestyle    Physical activity     Days per week: Not on file     Minutes per session: Not on file    Stress: Not on file   Relationships    Social connections     Talks on phone: Not on file     Gets together: Not on file     Attends Hoahaoism service: Not on file     Active member of club or organization: Not on file     Attends meetings of clubs or organizations: Not on file     Relationship  status: Not on file   Other Topics Concern    Not on file   Social History Narrative    Originally from [facility] [place] [facility]    Living in [place] with family        Getting reg exercise     Family History   Problem Relation Age of Onset    Breast cancer Maternal Grandmother 68    Hypertension Mother     Heart attack Mother     Lung cancer Father         lung, + tobacco    No Known Problems Brother     No Known Problems Daughter     Other Son         lymphedema    No Known Problems Son     No Known Problems Son     No Known Problems Daughter     Ovarian cancer Neg Hx     Colon cancer Neg Hx     Cancer Neg Hx      OB History    Para Term  AB Living   5 5 0         SAB TAB Ectopic Multiple Live Births                  # Outcome Date GA Lbr Charanjit/2nd Weight Sex Delivery Anes PTL Lv   5 Para      Vag-Spont      4 Para      Vag-Spont      3 Para      Vag-Spont      2 Para      Vag-Spont      1 Para      Vag-Spont              Current Outpatient Medications   Medication Sig Dispense Refill    atorvastatin (LIPITOR) 40 MG tablet Take 1 tablet (40 mg total) by mouth once daily. 90 tablet 4    cetirizine (ZYRTEC) 5 MG tablet Take 5 mg by mouth once daily.      fluticasone (FLONASE) 50 mcg/actuation nasal spray 1 spray by Each Nare route once daily.      naltrexone (DEPADE) 50 mg tablet TK 1 T PO D      sertraline (ZOLOFT) 50 MG tablet Take 1 tablet (50 mg total) by mouth once daily. 90 tablet 3    spironolactone (ALDACTONE) 100 MG tablet        No current facility-administered medications for this visit.      Allergies: Patient has no known allergies.     The 10-year ASCVD risk score (Juan PARHAM Jr., et al., 2013) is: 1.2%    Values used to calculate the score:      Age: 55 years      Sex: Female      Is Non- : No      Diabetic: No      Tobacco smoker: No      Systolic Blood Pressure: 110 mmHg      Is BP treated: No      HDL Cholesterol: 75 mg/dL      Total Cholesterol: 212  mg/dL      ROS:  Constitutional: no weight loss, weight gain, fever, fatigue  Eyes:  No vision changes, glasses/contacts  ENT/Mouth: No ulcers, sinus problems, ears ringing, headache  Cardiovascular: No inability to lie flat, chest pain, exercise intolerance, swelling, heart palpitations  Respiratory: No wheezing, coughing blood, shortness of breath, or cough  Gastrointestinal: No diarrhea, bloody stool, nausea/vomiting, constipation, gas, hemorrhoids  Genitourinary: No blood in urine, painful urination, urgency of urination, frequency of urination, incomplete emptying, incontinence, abnormal bleeding, painful periods, heavy periods, vaginal discharge, vaginal odor, painful intercourse, sexual problems, bleeding after intercourse.  Musculoskeletal: No muscle weakness  Skin/Breast: No painful breasts, nipple discharge, masses, rash, ulcers  Neurological: No passing out, seizures, numbness, headache  Endocrine: No diabetes, hypothyroid, hyperthyroid, hot flashes, hair loss, abnormal hair growth, acne  Psychiatric: No depression, crying  Hematologic: No bruises, bleeding, swollen lymph nodes, anemia.      Physical Exam:   Constitutional: She is oriented to person, place, and time. She appears well-developed and well-nourished.      Neck: Normal range of motion. No tracheal deviation present. No thyromegaly present.    Cardiovascular: Exam reveals no edema.     Pulmonary/Chest: Effort normal. She exhibits no mass, no tenderness, no deformity and no retraction. Right breast exhibits no inverted nipple, no mass, no nipple discharge, no skin change, no tenderness, presence, no bleeding and no swelling. Left breast exhibits no inverted nipple, no mass, no nipple discharge, no skin change, no tenderness, presence, no bleeding and no swelling. Breasts are symmetrical.        Abdominal: Soft. She exhibits no distension and no mass. There is no abdominal tenderness. There is no rebound and no guarding. No hernia. Hernia  confirmed negative in the left inguinal area.     Genitourinary:    Vagina and uterus normal.   Rectum:      No external hemorrhoid.   There is no rash, tenderness or lesion on the right labia. There is no rash, tenderness or lesion on the left labia. Uterus is not deviated. Cervix is normal. No no adexnal prolapse. Right adnexum displays no mass, no tenderness and no fullness. Left adnexum displays no mass, no tenderness and no fullness. No tenderness, bleeding, rectocele, cystocele or unspecified prolapse of vaginal walls in the vagina. Cervix exhibits no motion tenderness, no discharge and no friability. negative for vaginal discharge          Musculoskeletal: Normal range of motion and moves all extremeties. No edema.       Neurological: She is alert and oriented to person, place, and time.    Skin: No rash noted. No erythema. No pallor.    Psychiatric: She has a normal mood and affect. Her behavior is normal. Judgment and thought content normal.         ASSESSMENT:   well woman    PLAN:   mammogram  pap smear  return annually or prn

## 2020-12-03 LAB — TESTOST FREE SERPL-MCNC: 0.8 PG/ML

## 2020-12-04 LAB
HPV HR 12 DNA SPEC QL NAA+PROBE: NEGATIVE
HPV16 AG SPEC QL: NEGATIVE
HPV18 DNA SPEC QL NAA+PROBE: NEGATIVE

## 2021-01-12 LAB
FINAL PATHOLOGIC DIAGNOSIS: NORMAL
Lab: NORMAL

## 2021-03-02 ENCOUNTER — TELEPHONE (OUTPATIENT)
Dept: OBSTETRICS AND GYNECOLOGY | Facility: CLINIC | Age: 56
End: 2021-03-02

## 2021-03-02 DIAGNOSIS — N92.0 MENORRHAGIA WITH REGULAR CYCLE: Primary | ICD-10-CM

## 2021-03-03 ENCOUNTER — HOSPITAL ENCOUNTER (OUTPATIENT)
Dept: RADIOLOGY | Facility: OTHER | Age: 56
Discharge: HOME | End: 2021-03-03
Attending: OBSTETRICS & GYNECOLOGY
Payer: COMMERCIAL

## 2021-03-03 DIAGNOSIS — N92.0 MENORRHAGIA WITH REGULAR CYCLE: ICD-10-CM

## 2021-03-03 PROCEDURE — 76856 US EXAM PELVIC COMPLETE: CPT | Mod: 26,,, | Performed by: RADIOLOGY

## 2021-03-03 PROCEDURE — 76856 US PELVIS COMP WITH TRANSVAG NON-OB (XPD): ICD-10-PCS | Mod: 26,,, | Performed by: RADIOLOGY

## 2021-03-03 PROCEDURE — 76830 US PELVIS COMP WITH TRANSVAG NON-OB (XPD): ICD-10-PCS | Mod: 26,,, | Performed by: RADIOLOGY

## 2021-03-03 PROCEDURE — 76830 TRANSVAGINAL US NON-OB: CPT | Mod: 26,,, | Performed by: RADIOLOGY

## 2021-03-03 PROCEDURE — 76856 US EXAM PELVIC COMPLETE: CPT | Mod: TC

## 2021-03-11 ENCOUNTER — OFFICE VISIT (OUTPATIENT)
Dept: INTERNAL MEDICINE | Facility: CLINIC | Age: 56
End: 2021-03-11
Payer: COMMERCIAL

## 2021-03-11 VITALS
DIASTOLIC BLOOD PRESSURE: 75 MMHG | HEIGHT: 63 IN | WEIGHT: 145.75 LBS | BODY MASS INDEX: 25.82 KG/M2 | HEART RATE: 78 BPM | SYSTOLIC BLOOD PRESSURE: 123 MMHG | OXYGEN SATURATION: 98 %

## 2021-03-11 DIAGNOSIS — N39.3 SUI (STRESS URINARY INCONTINENCE, FEMALE): ICD-10-CM

## 2021-03-11 DIAGNOSIS — E04.1 THYROID NODULE: ICD-10-CM

## 2021-03-11 DIAGNOSIS — Z12.11 SCREENING FOR MALIGNANT NEOPLASM OF COLON: Primary | ICD-10-CM

## 2021-03-11 DIAGNOSIS — F32.A ANXIETY AND DEPRESSION: ICD-10-CM

## 2021-03-11 DIAGNOSIS — E78.00 HYPERCHOLESTEROLEMIA: ICD-10-CM

## 2021-03-11 DIAGNOSIS — R91.1 SOLITARY LUNG NODULE: ICD-10-CM

## 2021-03-11 DIAGNOSIS — F41.9 ANXIETY AND DEPRESSION: ICD-10-CM

## 2021-03-11 PROCEDURE — 99213 OFFICE O/P EST LOW 20 MIN: CPT | Mod: S$GLB,,, | Performed by: STUDENT IN AN ORGANIZED HEALTH CARE EDUCATION/TRAINING PROGRAM

## 2021-03-11 PROCEDURE — 3008F BODY MASS INDEX DOCD: CPT | Mod: CPTII,S$GLB,, | Performed by: STUDENT IN AN ORGANIZED HEALTH CARE EDUCATION/TRAINING PROGRAM

## 2021-03-11 PROCEDURE — 1126F AMNT PAIN NOTED NONE PRSNT: CPT | Mod: S$GLB,,, | Performed by: STUDENT IN AN ORGANIZED HEALTH CARE EDUCATION/TRAINING PROGRAM

## 2021-03-11 PROCEDURE — 99999 PR PBB SHADOW E&M-EST. PATIENT-LVL III: CPT | Mod: PBBFAC,,, | Performed by: STUDENT IN AN ORGANIZED HEALTH CARE EDUCATION/TRAINING PROGRAM

## 2021-03-11 PROCEDURE — 3008F PR BODY MASS INDEX (BMI) DOCUMENTED: ICD-10-PCS | Mod: CPTII,S$GLB,, | Performed by: STUDENT IN AN ORGANIZED HEALTH CARE EDUCATION/TRAINING PROGRAM

## 2021-03-11 PROCEDURE — 99213 PR OFFICE/OUTPT VISIT, EST, LEVL III, 20-29 MIN: ICD-10-PCS | Mod: S$GLB,,, | Performed by: STUDENT IN AN ORGANIZED HEALTH CARE EDUCATION/TRAINING PROGRAM

## 2021-03-11 PROCEDURE — 99999 PR PBB SHADOW E&M-EST. PATIENT-LVL III: ICD-10-PCS | Mod: PBBFAC,,, | Performed by: STUDENT IN AN ORGANIZED HEALTH CARE EDUCATION/TRAINING PROGRAM

## 2021-03-11 PROCEDURE — 1126F PR PAIN SEVERITY QUANTIFIED, NO PAIN PRESENT: ICD-10-PCS | Mod: S$GLB,,, | Performed by: STUDENT IN AN ORGANIZED HEALTH CARE EDUCATION/TRAINING PROGRAM

## 2021-03-11 RX ORDER — ATORVASTATIN CALCIUM 40 MG/1
40 TABLET, FILM COATED ORAL DAILY
Qty: 90 TABLET | Refills: 4 | Status: SHIPPED | OUTPATIENT
Start: 2021-03-11 | End: 2021-04-30

## 2021-03-12 ENCOUNTER — IMMUNIZATION (OUTPATIENT)
Dept: PHARMACY | Facility: CLINIC | Age: 56
End: 2021-03-12

## 2021-03-12 ENCOUNTER — IMMUNIZATION (OUTPATIENT)
Dept: PHARMACY | Facility: CLINIC | Age: 56
End: 2021-03-12
Payer: COMMERCIAL

## 2021-03-29 ENCOUNTER — TELEPHONE (OUTPATIENT)
Dept: OBSTETRICS AND GYNECOLOGY | Facility: CLINIC | Age: 56
End: 2021-03-29

## 2021-05-04 ENCOUNTER — PATIENT MESSAGE (OUTPATIENT)
Dept: ENDOSCOPY | Facility: HOSPITAL | Age: 56
End: 2021-05-04

## 2021-05-10 ENCOUNTER — TELEPHONE (OUTPATIENT)
Dept: SURGERY | Facility: CLINIC | Age: 56
End: 2021-05-10

## 2021-05-11 ENCOUNTER — OFFICE VISIT (OUTPATIENT)
Dept: SURGERY | Facility: CLINIC | Age: 56
End: 2021-05-11
Payer: COMMERCIAL

## 2021-05-11 VITALS
SYSTOLIC BLOOD PRESSURE: 163 MMHG | BODY MASS INDEX: 25.41 KG/M2 | DIASTOLIC BLOOD PRESSURE: 86 MMHG | HEIGHT: 64 IN | HEART RATE: 79 BPM | WEIGHT: 148.81 LBS

## 2021-05-11 DIAGNOSIS — K64.9 HEMORRHOIDS, UNSPECIFIED HEMORRHOID TYPE: ICD-10-CM

## 2021-05-11 DIAGNOSIS — N39.3 SUI (STRESS URINARY INCONTINENCE, FEMALE): Primary | ICD-10-CM

## 2021-05-11 PROCEDURE — 99204 PR OFFICE/OUTPT VISIT, NEW, LEVL IV, 45-59 MIN: ICD-10-PCS | Mod: S$GLB,,, | Performed by: STUDENT IN AN ORGANIZED HEALTH CARE EDUCATION/TRAINING PROGRAM

## 2021-05-11 PROCEDURE — 99999 PR PBB SHADOW E&M-EST. PATIENT-LVL III: CPT | Mod: PBBFAC,,, | Performed by: STUDENT IN AN ORGANIZED HEALTH CARE EDUCATION/TRAINING PROGRAM

## 2021-05-11 PROCEDURE — 99204 OFFICE O/P NEW MOD 45 MIN: CPT | Mod: S$GLB,,, | Performed by: STUDENT IN AN ORGANIZED HEALTH CARE EDUCATION/TRAINING PROGRAM

## 2021-05-11 PROCEDURE — 99999 PR PBB SHADOW E&M-EST. PATIENT-LVL III: ICD-10-PCS | Mod: PBBFAC,,, | Performed by: STUDENT IN AN ORGANIZED HEALTH CARE EDUCATION/TRAINING PROGRAM

## 2021-05-11 RX ORDER — HYDROCORTISONE ACETATE 25 MG/1
25 SUPPOSITORY RECTAL 2 TIMES DAILY
Qty: 20 SUPPOSITORY | Refills: 0 | Status: SHIPPED | OUTPATIENT
Start: 2021-05-11 | End: 2021-05-21

## 2021-05-17 ENCOUNTER — IMMUNIZATION (OUTPATIENT)
Dept: PHARMACY | Facility: CLINIC | Age: 56
End: 2021-05-17
Payer: COMMERCIAL

## 2021-06-03 ENCOUNTER — TELEPHONE (OUTPATIENT)
Dept: OBSTETRICS AND GYNECOLOGY | Facility: CLINIC | Age: 56
End: 2021-06-03

## 2021-06-03 ENCOUNTER — PATIENT MESSAGE (OUTPATIENT)
Dept: OBSTETRICS AND GYNECOLOGY | Facility: CLINIC | Age: 56
End: 2021-06-03

## 2021-06-03 ENCOUNTER — TELEPHONE (OUTPATIENT)
Dept: SURGERY | Facility: CLINIC | Age: 56
End: 2021-06-03

## 2021-06-03 DIAGNOSIS — Z12.31 ENCOUNTER FOR SCREENING MAMMOGRAM FOR MALIGNANT NEOPLASM OF BREAST: Primary | ICD-10-CM

## 2021-06-08 ENCOUNTER — PATIENT OUTREACH (OUTPATIENT)
Dept: ADMINISTRATIVE | Facility: OTHER | Age: 56
End: 2021-06-08

## 2021-06-18 ENCOUNTER — HOSPITAL ENCOUNTER (OUTPATIENT)
Dept: RADIOLOGY | Facility: OTHER | Age: 56
Discharge: HOME | End: 2021-06-18
Attending: OBSTETRICS & GYNECOLOGY
Payer: COMMERCIAL

## 2021-06-18 DIAGNOSIS — Z12.31 ENCOUNTER FOR SCREENING MAMMOGRAM FOR MALIGNANT NEOPLASM OF BREAST: ICD-10-CM

## 2021-06-18 PROCEDURE — 77067 SCR MAMMO BI INCL CAD: CPT | Mod: TC

## 2021-06-18 PROCEDURE — 77067 MAMMO DIGITAL SCREENING BILAT WITH TOMO: ICD-10-PCS | Mod: 26,,, | Performed by: RADIOLOGY

## 2021-06-18 PROCEDURE — 77063 BREAST TOMOSYNTHESIS BI: CPT | Mod: 26,,, | Performed by: RADIOLOGY

## 2021-06-18 PROCEDURE — 77067 SCR MAMMO BI INCL CAD: CPT | Mod: 26,,, | Performed by: RADIOLOGY

## 2021-06-18 PROCEDURE — 77063 MAMMO DIGITAL SCREENING BILAT WITH TOMO: ICD-10-PCS | Mod: 26,,, | Performed by: RADIOLOGY

## 2021-06-28 ENCOUNTER — TELEPHONE (OUTPATIENT)
Dept: SURGERY | Facility: CLINIC | Age: 56
End: 2021-06-28

## 2021-08-02 ENCOUNTER — PATIENT MESSAGE (OUTPATIENT)
Dept: UROLOGY | Facility: CLINIC | Age: 56
End: 2021-08-02

## 2021-08-17 ENCOUNTER — TELEPHONE (OUTPATIENT)
Dept: INTERNAL MEDICINE | Facility: CLINIC | Age: 56
End: 2021-08-17

## 2021-08-20 DIAGNOSIS — F32.A DEPRESSION, UNSPECIFIED DEPRESSION TYPE: ICD-10-CM

## 2021-08-20 RX ORDER — SERTRALINE HYDROCHLORIDE 50 MG/1
50 TABLET, FILM COATED ORAL DAILY
Qty: 90 TABLET | Refills: 3 | Status: SHIPPED | OUTPATIENT
Start: 2021-08-20 | End: 2021-10-15

## 2021-08-24 ENCOUNTER — OFFICE VISIT (OUTPATIENT)
Dept: SURGERY | Facility: CLINIC | Age: 56
End: 2021-08-24
Payer: COMMERCIAL

## 2021-08-24 ENCOUNTER — PATIENT MESSAGE (OUTPATIENT)
Dept: SURGERY | Facility: CLINIC | Age: 56
End: 2021-08-24

## 2021-08-24 VITALS
DIASTOLIC BLOOD PRESSURE: 81 MMHG | SYSTOLIC BLOOD PRESSURE: 122 MMHG | WEIGHT: 151.5 LBS | HEART RATE: 84 BPM | HEIGHT: 64 IN | BODY MASS INDEX: 25.86 KG/M2

## 2021-08-24 DIAGNOSIS — K64.9 HEMORRHOIDS, UNSPECIFIED HEMORRHOID TYPE: Primary | ICD-10-CM

## 2021-08-24 PROCEDURE — 99212 OFFICE O/P EST SF 10 MIN: CPT | Mod: S$GLB,,, | Performed by: STUDENT IN AN ORGANIZED HEALTH CARE EDUCATION/TRAINING PROGRAM

## 2021-08-24 PROCEDURE — 99212 PR OFFICE/OUTPT VISIT, EST, LEVL II, 10-19 MIN: ICD-10-PCS | Mod: S$GLB,,, | Performed by: STUDENT IN AN ORGANIZED HEALTH CARE EDUCATION/TRAINING PROGRAM

## 2021-08-24 PROCEDURE — 99999 PR PBB SHADOW E&M-EST. PATIENT-LVL III: ICD-10-PCS | Mod: PBBFAC,,, | Performed by: STUDENT IN AN ORGANIZED HEALTH CARE EDUCATION/TRAINING PROGRAM

## 2021-08-24 PROCEDURE — 99999 PR PBB SHADOW E&M-EST. PATIENT-LVL III: CPT | Mod: PBBFAC,,, | Performed by: STUDENT IN AN ORGANIZED HEALTH CARE EDUCATION/TRAINING PROGRAM

## 2021-08-25 ENCOUNTER — PATIENT OUTREACH (OUTPATIENT)
Dept: ADMINISTRATIVE | Facility: OTHER | Age: 56
End: 2021-08-25

## 2021-10-07 ENCOUNTER — PATIENT MESSAGE (OUTPATIENT)
Dept: SURGERY | Facility: CLINIC | Age: 56
End: 2021-10-07

## 2021-10-12 ENCOUNTER — TELEPHONE (OUTPATIENT)
Dept: INTERNAL MEDICINE | Facility: CLINIC | Age: 56
End: 2021-10-12

## 2021-10-12 DIAGNOSIS — R11.0 NAUSEA: Primary | ICD-10-CM

## 2021-10-12 RX ORDER — ONDANSETRON 8 MG/1
8 TABLET, ORALLY DISINTEGRATING ORAL EVERY 6 HOURS PRN
Qty: 15 TABLET | Refills: 1 | Status: SHIPPED | OUTPATIENT
Start: 2021-10-12 | End: 2022-04-25 | Stop reason: SDUPTHER

## 2021-10-13 ENCOUNTER — TELEPHONE (OUTPATIENT)
Dept: ENDOSCOPY | Facility: HOSPITAL | Age: 56
End: 2021-10-13

## 2021-10-13 ENCOUNTER — PATIENT MESSAGE (OUTPATIENT)
Dept: ENDOSCOPY | Facility: HOSPITAL | Age: 56
End: 2021-10-13
Payer: COMMERCIAL

## 2021-10-13 DIAGNOSIS — Z01.818 PRE-OP TESTING: ICD-10-CM

## 2021-10-13 DIAGNOSIS — Z12.11 COLON CANCER SCREENING: Primary | ICD-10-CM

## 2021-10-13 RX ORDER — SODIUM, POTASSIUM,MAG SULFATES 17.5-3.13G
1 SOLUTION, RECONSTITUTED, ORAL ORAL DAILY
Qty: 1 KIT | Refills: 0 | Status: SHIPPED | OUTPATIENT
Start: 2021-10-13 | End: 2021-10-15

## 2021-10-15 ENCOUNTER — PATIENT MESSAGE (OUTPATIENT)
Dept: OBSTETRICS AND GYNECOLOGY | Facility: CLINIC | Age: 56
End: 2021-10-15

## 2021-10-15 ENCOUNTER — OFFICE VISIT (OUTPATIENT)
Dept: INTERNAL MEDICINE | Facility: CLINIC | Age: 56
End: 2021-10-15
Payer: COMMERCIAL

## 2021-10-15 ENCOUNTER — OFFICE VISIT (OUTPATIENT)
Dept: UROLOGY | Facility: CLINIC | Age: 56
End: 2021-10-15
Payer: COMMERCIAL

## 2021-10-15 VITALS
BODY MASS INDEX: 25.14 KG/M2 | WEIGHT: 147.25 LBS | HEIGHT: 64 IN | HEART RATE: 74 BPM | SYSTOLIC BLOOD PRESSURE: 118 MMHG | DIASTOLIC BLOOD PRESSURE: 79 MMHG

## 2021-10-15 VITALS
BODY MASS INDEX: 25.18 KG/M2 | SYSTOLIC BLOOD PRESSURE: 124 MMHG | WEIGHT: 147.5 LBS | HEIGHT: 64 IN | OXYGEN SATURATION: 98 % | DIASTOLIC BLOOD PRESSURE: 88 MMHG | HEART RATE: 79 BPM

## 2021-10-15 DIAGNOSIS — Z00.00 PREVENTATIVE HEALTH CARE: Primary | ICD-10-CM

## 2021-10-15 DIAGNOSIS — E78.00 HYPERCHOLESTEROLEMIA: ICD-10-CM

## 2021-10-15 DIAGNOSIS — N39.3 SUI (STRESS URINARY INCONTINENCE, FEMALE): ICD-10-CM

## 2021-10-15 DIAGNOSIS — N39.46 MIXED STRESS AND URGE URINARY INCONTINENCE: Primary | ICD-10-CM

## 2021-10-15 DIAGNOSIS — F41.9 ANXIETY AND DEPRESSION: ICD-10-CM

## 2021-10-15 DIAGNOSIS — F32.A ANXIETY AND DEPRESSION: ICD-10-CM

## 2021-10-15 DIAGNOSIS — R91.1 SOLITARY LUNG NODULE: ICD-10-CM

## 2021-10-15 DIAGNOSIS — E04.1 THYROID NODULE: ICD-10-CM

## 2021-10-15 DIAGNOSIS — N81.6 RECTOCELE: ICD-10-CM

## 2021-10-15 DIAGNOSIS — F43.21 GRIEF REACTION: ICD-10-CM

## 2021-10-15 DIAGNOSIS — F32.A DEPRESSION, UNSPECIFIED DEPRESSION TYPE: ICD-10-CM

## 2021-10-15 PROBLEM — F43.20 GRIEF REACTION: Status: ACTIVE | Noted: 2021-10-15

## 2021-10-15 PROCEDURE — 1159F MED LIST DOCD IN RCRD: CPT | Mod: CPTII,S$GLB,, | Performed by: STUDENT IN AN ORGANIZED HEALTH CARE EDUCATION/TRAINING PROGRAM

## 2021-10-15 PROCEDURE — 3008F BODY MASS INDEX DOCD: CPT | Mod: CPTII,S$GLB,, | Performed by: UROLOGY

## 2021-10-15 PROCEDURE — 99396 PR PREVENTIVE VISIT,EST,40-64: ICD-10-PCS | Mod: S$GLB,,, | Performed by: STUDENT IN AN ORGANIZED HEALTH CARE EDUCATION/TRAINING PROGRAM

## 2021-10-15 PROCEDURE — 3074F PR MOST RECENT SYSTOLIC BLOOD PRESSURE < 130 MM HG: ICD-10-PCS | Mod: CPTII,S$GLB,, | Performed by: STUDENT IN AN ORGANIZED HEALTH CARE EDUCATION/TRAINING PROGRAM

## 2021-10-15 PROCEDURE — 3079F PR MOST RECENT DIASTOLIC BLOOD PRESSURE 80-89 MM HG: ICD-10-PCS | Mod: CPTII,S$GLB,, | Performed by: STUDENT IN AN ORGANIZED HEALTH CARE EDUCATION/TRAINING PROGRAM

## 2021-10-15 PROCEDURE — 99999 PR PBB SHADOW E&M-EST. PATIENT-LVL III: CPT | Mod: PBBFAC,,, | Performed by: UROLOGY

## 2021-10-15 PROCEDURE — 3079F DIAST BP 80-89 MM HG: CPT | Mod: CPTII,S$GLB,, | Performed by: STUDENT IN AN ORGANIZED HEALTH CARE EDUCATION/TRAINING PROGRAM

## 2021-10-15 PROCEDURE — 3074F PR MOST RECENT SYSTOLIC BLOOD PRESSURE < 130 MM HG: ICD-10-PCS | Mod: CPTII,S$GLB,, | Performed by: UROLOGY

## 2021-10-15 PROCEDURE — 99205 OFFICE O/P NEW HI 60 MIN: CPT | Mod: 25,S$GLB,, | Performed by: UROLOGY

## 2021-10-15 PROCEDURE — 51701 PR INSERTION OF NON-INDWELLING BLADDER CATHETERIZATION FOR RESIDUAL UR: ICD-10-PCS | Mod: S$GLB,,, | Performed by: UROLOGY

## 2021-10-15 PROCEDURE — 1159F MED LIST DOCD IN RCRD: CPT | Mod: CPTII,S$GLB,, | Performed by: UROLOGY

## 2021-10-15 PROCEDURE — 99999 PR PBB SHADOW E&M-EST. PATIENT-LVL III: ICD-10-PCS | Mod: PBBFAC,,, | Performed by: STUDENT IN AN ORGANIZED HEALTH CARE EDUCATION/TRAINING PROGRAM

## 2021-10-15 PROCEDURE — 3074F SYST BP LT 130 MM HG: CPT | Mod: CPTII,S$GLB,, | Performed by: UROLOGY

## 2021-10-15 PROCEDURE — 1159F PR MEDICATION LIST DOCUMENTED IN MEDICAL RECORD: ICD-10-PCS | Mod: CPTII,S$GLB,, | Performed by: UROLOGY

## 2021-10-15 PROCEDURE — 51701 INSERT BLADDER CATHETER: CPT | Mod: S$GLB,,, | Performed by: UROLOGY

## 2021-10-15 PROCEDURE — 3008F PR BODY MASS INDEX (BMI) DOCUMENTED: ICD-10-PCS | Mod: CPTII,S$GLB,, | Performed by: UROLOGY

## 2021-10-15 PROCEDURE — 99999 PR PBB SHADOW E&M-EST. PATIENT-LVL III: ICD-10-PCS | Mod: PBBFAC,,, | Performed by: UROLOGY

## 2021-10-15 PROCEDURE — 99999 PR PBB SHADOW E&M-EST. PATIENT-LVL III: CPT | Mod: PBBFAC,,, | Performed by: STUDENT IN AN ORGANIZED HEALTH CARE EDUCATION/TRAINING PROGRAM

## 2021-10-15 PROCEDURE — 1159F PR MEDICATION LIST DOCUMENTED IN MEDICAL RECORD: ICD-10-PCS | Mod: CPTII,S$GLB,, | Performed by: STUDENT IN AN ORGANIZED HEALTH CARE EDUCATION/TRAINING PROGRAM

## 2021-10-15 PROCEDURE — 3008F BODY MASS INDEX DOCD: CPT | Mod: CPTII,S$GLB,, | Performed by: STUDENT IN AN ORGANIZED HEALTH CARE EDUCATION/TRAINING PROGRAM

## 2021-10-15 PROCEDURE — 99396 PREV VISIT EST AGE 40-64: CPT | Mod: S$GLB,,, | Performed by: STUDENT IN AN ORGANIZED HEALTH CARE EDUCATION/TRAINING PROGRAM

## 2021-10-15 PROCEDURE — 99205 PR OFFICE/OUTPT VISIT, NEW, LEVL V, 60-74 MIN: ICD-10-PCS | Mod: 25,S$GLB,, | Performed by: UROLOGY

## 2021-10-15 PROCEDURE — 81002 URINALYSIS NONAUTO W/O SCOPE: CPT | Mod: S$GLB,,, | Performed by: UROLOGY

## 2021-10-15 PROCEDURE — 3008F PR BODY MASS INDEX (BMI) DOCUMENTED: ICD-10-PCS | Mod: CPTII,S$GLB,, | Performed by: STUDENT IN AN ORGANIZED HEALTH CARE EDUCATION/TRAINING PROGRAM

## 2021-10-15 PROCEDURE — 3078F DIAST BP <80 MM HG: CPT | Mod: CPTII,S$GLB,, | Performed by: UROLOGY

## 2021-10-15 PROCEDURE — 3074F SYST BP LT 130 MM HG: CPT | Mod: CPTII,S$GLB,, | Performed by: STUDENT IN AN ORGANIZED HEALTH CARE EDUCATION/TRAINING PROGRAM

## 2021-10-15 PROCEDURE — 3078F PR MOST RECENT DIASTOLIC BLOOD PRESSURE < 80 MM HG: ICD-10-PCS | Mod: CPTII,S$GLB,, | Performed by: UROLOGY

## 2021-10-15 PROCEDURE — 81002 PR URINALYSIS NONAUTO W/O SCOPE: ICD-10-PCS | Mod: S$GLB,,, | Performed by: UROLOGY

## 2021-10-15 RX ORDER — SERTRALINE HYDROCHLORIDE 100 MG/1
100 TABLET, FILM COATED ORAL DAILY
Qty: 90 TABLET | Refills: 3 | Status: SHIPPED | OUTPATIENT
Start: 2021-10-15 | End: 2022-04-12 | Stop reason: SDUPTHER

## 2021-10-15 RX ORDER — OXYBUTYNIN CHLORIDE 10 MG/1
10 TABLET, EXTENDED RELEASE ORAL DAILY
Qty: 30 TABLET | Refills: 11 | Status: SHIPPED | OUTPATIENT
Start: 2021-10-15 | End: 2021-11-08

## 2021-10-18 ENCOUNTER — TELEPHONE (OUTPATIENT)
Dept: OBSTETRICS AND GYNECOLOGY | Facility: HOSPITAL | Age: 56
End: 2021-10-18
Payer: COMMERCIAL

## 2021-10-18 DIAGNOSIS — N95.1 MENOPAUSAL SYMPTOM: Primary | ICD-10-CM

## 2021-10-24 ENCOUNTER — LAB VISIT (OUTPATIENT)
Dept: SPORTS MEDICINE | Facility: CLINIC | Age: 56
End: 2021-10-24
Payer: COMMERCIAL

## 2021-10-24 DIAGNOSIS — Z01.818 PRE-OP TESTING: ICD-10-CM

## 2021-10-24 LAB
SARS-COV-2 RNA RESP QL NAA+PROBE: NOT DETECTED
SARS-COV-2- CYCLE NUMBER: NORMAL

## 2021-10-24 PROCEDURE — U0003 INFECTIOUS AGENT DETECTION BY NUCLEIC ACID (DNA OR RNA); SEVERE ACUTE RESPIRATORY SYNDROME CORONAVIRUS 2 (SARS-COV-2) (CORONAVIRUS DISEASE [COVID-19]), AMPLIFIED PROBE TECHNIQUE, MAKING USE OF HIGH THROUGHPUT TECHNOLOGIES AS DESCRIBED BY CMS-2020-01-R: HCPCS | Performed by: CLINICAL NURSE SPECIALIST

## 2021-10-24 PROCEDURE — U0005 INFEC AGEN DETEC AMPLI PROBE: HCPCS | Performed by: CLINICAL NURSE SPECIALIST

## 2021-10-26 ENCOUNTER — PATIENT MESSAGE (OUTPATIENT)
Dept: INTERNAL MEDICINE | Facility: CLINIC | Age: 56
End: 2021-10-26
Payer: COMMERCIAL

## 2021-10-26 ENCOUNTER — IMMUNIZATION (OUTPATIENT)
Dept: INTERNAL MEDICINE | Facility: CLINIC | Age: 56
End: 2021-10-26
Payer: COMMERCIAL

## 2021-10-26 ENCOUNTER — TELEPHONE (OUTPATIENT)
Dept: OBSTETRICS AND GYNECOLOGY | Facility: CLINIC | Age: 56
End: 2021-10-26
Payer: COMMERCIAL

## 2021-10-26 PROCEDURE — 90471 FLU VACCINE (QUAD) GREATER THAN OR EQUAL TO 3YO PRESERVATIVE FREE IM: ICD-10-PCS | Mod: S$GLB,,, | Performed by: INTERNAL MEDICINE

## 2021-10-26 PROCEDURE — 90686 FLU VACCINE (QUAD) GREATER THAN OR EQUAL TO 3YO PRESERVATIVE FREE IM: ICD-10-PCS | Mod: S$GLB,,, | Performed by: INTERNAL MEDICINE

## 2021-10-26 PROCEDURE — 90471 IMMUNIZATION ADMIN: CPT | Mod: S$GLB,,, | Performed by: INTERNAL MEDICINE

## 2021-10-26 PROCEDURE — 90686 IIV4 VACC NO PRSV 0.5 ML IM: CPT | Mod: S$GLB,,, | Performed by: INTERNAL MEDICINE

## 2021-10-27 ENCOUNTER — ANESTHESIA (OUTPATIENT)
Dept: ENDOSCOPY | Facility: HOSPITAL | Age: 56
End: 2021-10-27
Payer: COMMERCIAL

## 2021-10-27 ENCOUNTER — ANESTHESIA EVENT (OUTPATIENT)
Dept: ENDOSCOPY | Facility: HOSPITAL | Age: 56
End: 2021-10-27
Payer: COMMERCIAL

## 2021-10-27 ENCOUNTER — HOSPITAL ENCOUNTER (OUTPATIENT)
Facility: HOSPITAL | Age: 56
Discharge: HOME | End: 2021-10-27
Attending: STUDENT IN AN ORGANIZED HEALTH CARE EDUCATION/TRAINING PROGRAM | Admitting: COLON & RECTAL SURGERY
Payer: COMMERCIAL

## 2021-10-27 VITALS
OXYGEN SATURATION: 100 % | HEART RATE: 75 BPM | DIASTOLIC BLOOD PRESSURE: 59 MMHG | RESPIRATION RATE: 17 BRPM | SYSTOLIC BLOOD PRESSURE: 104 MMHG | TEMPERATURE: 98 F | WEIGHT: 140 LBS | BODY MASS INDEX: 23.9 KG/M2 | HEIGHT: 64 IN

## 2021-10-27 DIAGNOSIS — Z12.11 COLON CANCER SCREENING: Primary | ICD-10-CM

## 2021-10-27 PROCEDURE — 45380 COLONOSCOPY AND BIOPSY: CPT | Mod: 33,,, | Performed by: COLON & RECTAL SURGERY

## 2021-10-27 PROCEDURE — E9220 PRA ENDO ANESTHESIA: ICD-10-PCS | Mod: 33,,, | Performed by: REGISTERED NURSE

## 2021-10-27 PROCEDURE — 45380 COLONOSCOPY AND BIOPSY: CPT | Performed by: COLON & RECTAL SURGERY

## 2021-10-27 PROCEDURE — 27201012 HC FORCEPS, HOT/COLD, DISP: Performed by: COLON & RECTAL SURGERY

## 2021-10-27 PROCEDURE — 37000009 HC ANESTHESIA EA ADD 15 MINS: Performed by: COLON & RECTAL SURGERY

## 2021-10-27 PROCEDURE — 88305 TISSUE EXAM BY PATHOLOGIST: ICD-10-PCS | Mod: 26,,, | Performed by: PATHOLOGY

## 2021-10-27 PROCEDURE — E9220 PRA ENDO ANESTHESIA: HCPCS | Mod: 33,,, | Performed by: REGISTERED NURSE

## 2021-10-27 PROCEDURE — 45380 PR COLONOSCOPY,BIOPSY: ICD-10-PCS | Mod: 33,,, | Performed by: COLON & RECTAL SURGERY

## 2021-10-27 PROCEDURE — 25000003 PHARM REV CODE 250: Performed by: REGISTERED NURSE

## 2021-10-27 PROCEDURE — 88305 TISSUE EXAM BY PATHOLOGIST: CPT | Performed by: PATHOLOGY

## 2021-10-27 PROCEDURE — 88305 TISSUE EXAM BY PATHOLOGIST: CPT | Mod: 26,,, | Performed by: PATHOLOGY

## 2021-10-27 PROCEDURE — 63600175 PHARM REV CODE 636 W HCPCS: Performed by: REGISTERED NURSE

## 2021-10-27 PROCEDURE — 37000008 HC ANESTHESIA 1ST 15 MINUTES: Performed by: COLON & RECTAL SURGERY

## 2021-10-27 RX ORDER — PROPOFOL 10 MG/ML
VIAL (ML) INTRAVENOUS
Status: DISCONTINUED | OUTPATIENT
Start: 2021-10-27 | End: 2021-10-27

## 2021-10-27 RX ORDER — PROPOFOL 10 MG/ML
VIAL (ML) INTRAVENOUS CONTINUOUS PRN
Status: DISCONTINUED | OUTPATIENT
Start: 2021-10-27 | End: 2021-10-27

## 2021-10-27 RX ORDER — LIDOCAINE HYDROCHLORIDE 20 MG/ML
INJECTION INTRAVENOUS
Status: DISCONTINUED | OUTPATIENT
Start: 2021-10-27 | End: 2021-10-27

## 2021-10-27 RX ORDER — SODIUM CHLORIDE 9 MG/ML
INJECTION, SOLUTION INTRAVENOUS CONTINUOUS
Status: DISCONTINUED | OUTPATIENT
Start: 2021-10-27 | End: 2021-10-27 | Stop reason: HOSPADM

## 2021-10-27 RX ADMIN — Medication 20 MG: at 01:10

## 2021-10-27 RX ADMIN — PROPOFOL 150 MCG/KG/MIN: 10 INJECTION, EMULSION INTRAVENOUS at 01:10

## 2021-10-27 RX ADMIN — LIDOCAINE HYDROCHLORIDE 100 MG: 20 INJECTION, SOLUTION INTRAVENOUS at 01:10

## 2021-10-27 RX ADMIN — SODIUM CHLORIDE: 0.9 INJECTION, SOLUTION INTRAVENOUS at 01:10

## 2021-10-27 RX ADMIN — Medication 90 MG: at 01:10

## 2021-10-29 ENCOUNTER — HOSPITAL ENCOUNTER (OUTPATIENT)
Dept: RADIOLOGY | Facility: OTHER | Age: 56
Discharge: HOME | End: 2021-10-29
Attending: STUDENT IN AN ORGANIZED HEALTH CARE EDUCATION/TRAINING PROGRAM
Payer: COMMERCIAL

## 2021-10-29 ENCOUNTER — LAB VISIT (OUTPATIENT)
Dept: LAB | Facility: OTHER | Age: 56
End: 2021-10-29
Attending: STUDENT IN AN ORGANIZED HEALTH CARE EDUCATION/TRAINING PROGRAM
Payer: COMMERCIAL

## 2021-10-29 DIAGNOSIS — E04.1 THYROID NODULE: ICD-10-CM

## 2021-10-29 DIAGNOSIS — Z00.00 PREVENTATIVE HEALTH CARE: ICD-10-CM

## 2021-10-29 DIAGNOSIS — N95.1 MENOPAUSAL SYMPTOM: ICD-10-CM

## 2021-10-29 LAB
ALBUMIN SERPL BCP-MCNC: 4.1 G/DL (ref 3.5–5.2)
ALP SERPL-CCNC: 75 U/L (ref 55–135)
ALT SERPL W/O P-5'-P-CCNC: 16 U/L (ref 10–44)
ANION GAP SERPL CALC-SCNC: 8 MMOL/L (ref 8–16)
AST SERPL-CCNC: 23 U/L (ref 10–40)
BASOPHILS # BLD AUTO: 0.03 K/UL (ref 0–0.2)
BASOPHILS NFR BLD: 0.5 % (ref 0–1.9)
BILIRUB SERPL-MCNC: 0.6 MG/DL (ref 0.1–1)
BUN SERPL-MCNC: 14 MG/DL (ref 6–20)
CALCIUM SERPL-MCNC: 10.3 MG/DL (ref 8.7–10.5)
CHLORIDE SERPL-SCNC: 106 MMOL/L (ref 95–110)
CHOLEST SERPL-MCNC: 230 MG/DL (ref 120–199)
CHOLEST/HDLC SERPL: 3.3 {RATIO} (ref 2–5)
CO2 SERPL-SCNC: 27 MMOL/L (ref 23–29)
CREAT SERPL-MCNC: 0.8 MG/DL (ref 0.5–1.4)
DIFFERENTIAL METHOD: ABNORMAL
EOSINOPHIL # BLD AUTO: 0.1 K/UL (ref 0–0.5)
EOSINOPHIL NFR BLD: 0.9 % (ref 0–8)
ERYTHROCYTE [DISTWIDTH] IN BLOOD BY AUTOMATED COUNT: 12.5 % (ref 11.5–14.5)
EST. GFR  (AFRICAN AMERICAN): >60 ML/MIN/1.73 M^2
EST. GFR  (NON AFRICAN AMERICAN): >60 ML/MIN/1.73 M^2
ESTIMATED AVG GLUCOSE: 111 MG/DL (ref 68–131)
ESTRADIOL SERPL-MCNC: <10 PG/ML
FSH SERPL-ACNC: 116.95 MIU/ML
GLUCOSE SERPL-MCNC: 99 MG/DL (ref 70–110)
HBA1C MFR BLD: 5.5 % (ref 4–5.6)
HCT VFR BLD AUTO: 40.9 % (ref 37–48.5)
HDLC SERPL-MCNC: 69 MG/DL (ref 40–75)
HDLC SERPL: 30 % (ref 20–50)
HGB BLD-MCNC: 13.6 G/DL (ref 12–16)
IMM GRANULOCYTES # BLD AUTO: 0.02 K/UL (ref 0–0.04)
IMM GRANULOCYTES NFR BLD AUTO: 0.4 % (ref 0–0.5)
LDLC SERPL CALC-MCNC: 147.4 MG/DL (ref 63–159)
LYMPHOCYTES # BLD AUTO: 1.5 K/UL (ref 1–4.8)
LYMPHOCYTES NFR BLD: 26.4 % (ref 18–48)
MCH RBC QN AUTO: 29.8 PG (ref 27–31)
MCHC RBC AUTO-ENTMCNC: 33.3 G/DL (ref 32–36)
MCV RBC AUTO: 90 FL (ref 82–98)
MONOCYTES # BLD AUTO: 0.7 K/UL (ref 0.3–1)
MONOCYTES NFR BLD: 12.9 % (ref 4–15)
NEUTROPHILS # BLD AUTO: 3.3 K/UL (ref 1.8–7.7)
NEUTROPHILS NFR BLD: 58.9 % (ref 38–73)
NONHDLC SERPL-MCNC: 161 MG/DL
NRBC BLD-RTO: 0 /100 WBC
PLATELET # BLD AUTO: 322 K/UL (ref 150–450)
PMV BLD AUTO: 9 FL (ref 9.2–12.9)
POTASSIUM SERPL-SCNC: 5.1 MMOL/L (ref 3.5–5.1)
PROT SERPL-MCNC: 7.8 G/DL (ref 6–8.4)
RBC # BLD AUTO: 4.57 M/UL (ref 4–5.4)
SODIUM SERPL-SCNC: 141 MMOL/L (ref 136–145)
TRIGL SERPL-MCNC: 68 MG/DL (ref 30–150)
TSH SERPL DL<=0.005 MIU/L-ACNC: 0.59 UIU/ML (ref 0.4–4)
WBC # BLD AUTO: 5.56 K/UL (ref 3.9–12.7)

## 2021-10-29 PROCEDURE — 82670 ASSAY OF TOTAL ESTRADIOL: CPT | Performed by: OBSTETRICS & GYNECOLOGY

## 2021-10-29 PROCEDURE — 84443 ASSAY THYROID STIM HORMONE: CPT | Performed by: STUDENT IN AN ORGANIZED HEALTH CARE EDUCATION/TRAINING PROGRAM

## 2021-10-29 PROCEDURE — 83036 HEMOGLOBIN GLYCOSYLATED A1C: CPT | Performed by: STUDENT IN AN ORGANIZED HEALTH CARE EDUCATION/TRAINING PROGRAM

## 2021-10-29 PROCEDURE — 76536 US EXAM OF HEAD AND NECK: CPT | Mod: TC

## 2021-10-29 PROCEDURE — 84402 ASSAY OF FREE TESTOSTERONE: CPT | Performed by: OBSTETRICS & GYNECOLOGY

## 2021-10-29 PROCEDURE — 36415 COLL VENOUS BLD VENIPUNCTURE: CPT | Performed by: OBSTETRICS & GYNECOLOGY

## 2021-10-29 PROCEDURE — 85025 COMPLETE CBC W/AUTO DIFF WBC: CPT | Performed by: STUDENT IN AN ORGANIZED HEALTH CARE EDUCATION/TRAINING PROGRAM

## 2021-10-29 PROCEDURE — 76536 US SOFT TISSUE HEAD NECK THYROID: ICD-10-PCS | Mod: 26,,, | Performed by: RADIOLOGY

## 2021-10-29 PROCEDURE — 80053 COMPREHEN METABOLIC PANEL: CPT | Performed by: STUDENT IN AN ORGANIZED HEALTH CARE EDUCATION/TRAINING PROGRAM

## 2021-10-29 PROCEDURE — 80061 LIPID PANEL: CPT | Performed by: STUDENT IN AN ORGANIZED HEALTH CARE EDUCATION/TRAINING PROGRAM

## 2021-10-29 PROCEDURE — 76536 US EXAM OF HEAD AND NECK: CPT | Mod: 26,,, | Performed by: RADIOLOGY

## 2021-10-29 PROCEDURE — 83001 ASSAY OF GONADOTROPIN (FSH): CPT | Performed by: OBSTETRICS & GYNECOLOGY

## 2021-10-31 ENCOUNTER — PATIENT OUTREACH (OUTPATIENT)
Dept: ADMINISTRATIVE | Facility: OTHER | Age: 56
End: 2021-10-31
Payer: COMMERCIAL

## 2021-11-01 ENCOUNTER — OFFICE VISIT (OUTPATIENT)
Dept: OBSTETRICS AND GYNECOLOGY | Facility: CLINIC | Age: 56
End: 2021-11-01
Attending: OBSTETRICS & GYNECOLOGY
Payer: COMMERCIAL

## 2021-11-01 VITALS
DIASTOLIC BLOOD PRESSURE: 78 MMHG | BODY MASS INDEX: 26.05 KG/M2 | WEIGHT: 147 LBS | HEART RATE: 80 BPM | HEIGHT: 63 IN | SYSTOLIC BLOOD PRESSURE: 116 MMHG

## 2021-11-01 DIAGNOSIS — F43.21 GRIEF REACTION: ICD-10-CM

## 2021-11-01 DIAGNOSIS — N95.1 MENOPAUSAL SYMPTOM: Primary | ICD-10-CM

## 2021-11-01 DIAGNOSIS — F32.A DEPRESSION, UNSPECIFIED DEPRESSION TYPE: ICD-10-CM

## 2021-11-01 LAB — TESTOST FREE SERPL-MCNC: 0.6 PG/ML

## 2021-11-01 PROCEDURE — 3074F PR MOST RECENT SYSTOLIC BLOOD PRESSURE < 130 MM HG: ICD-10-PCS | Mod: CPTII,S$GLB,, | Performed by: OBSTETRICS & GYNECOLOGY

## 2021-11-01 PROCEDURE — 99213 PR OFFICE/OUTPT VISIT, EST, LEVL III, 20-29 MIN: ICD-10-PCS | Mod: S$GLB,,, | Performed by: OBSTETRICS & GYNECOLOGY

## 2021-11-01 PROCEDURE — 1159F MED LIST DOCD IN RCRD: CPT | Mod: CPTII,S$GLB,, | Performed by: OBSTETRICS & GYNECOLOGY

## 2021-11-01 PROCEDURE — 3044F PR MOST RECENT HEMOGLOBIN A1C LEVEL <7.0%: ICD-10-PCS | Mod: CPTII,S$GLB,, | Performed by: OBSTETRICS & GYNECOLOGY

## 2021-11-01 PROCEDURE — 3044F HG A1C LEVEL LT 7.0%: CPT | Mod: CPTII,S$GLB,, | Performed by: OBSTETRICS & GYNECOLOGY

## 2021-11-01 PROCEDURE — 3008F PR BODY MASS INDEX (BMI) DOCUMENTED: ICD-10-PCS | Mod: CPTII,S$GLB,, | Performed by: OBSTETRICS & GYNECOLOGY

## 2021-11-01 PROCEDURE — 3074F SYST BP LT 130 MM HG: CPT | Mod: CPTII,S$GLB,, | Performed by: OBSTETRICS & GYNECOLOGY

## 2021-11-01 PROCEDURE — 1159F PR MEDICATION LIST DOCUMENTED IN MEDICAL RECORD: ICD-10-PCS | Mod: CPTII,S$GLB,, | Performed by: OBSTETRICS & GYNECOLOGY

## 2021-11-01 PROCEDURE — 3078F PR MOST RECENT DIASTOLIC BLOOD PRESSURE < 80 MM HG: ICD-10-PCS | Mod: CPTII,S$GLB,, | Performed by: OBSTETRICS & GYNECOLOGY

## 2021-11-01 PROCEDURE — 3008F BODY MASS INDEX DOCD: CPT | Mod: CPTII,S$GLB,, | Performed by: OBSTETRICS & GYNECOLOGY

## 2021-11-01 PROCEDURE — 99213 OFFICE O/P EST LOW 20 MIN: CPT | Mod: S$GLB,,, | Performed by: OBSTETRICS & GYNECOLOGY

## 2021-11-01 PROCEDURE — 3078F DIAST BP <80 MM HG: CPT | Mod: CPTII,S$GLB,, | Performed by: OBSTETRICS & GYNECOLOGY

## 2021-11-01 RX ORDER — PROGESTERONE 200 MG/1
200 CAPSULE ORAL DAILY
Qty: 30 CAPSULE | Refills: 11 | Status: SHIPPED | OUTPATIENT
Start: 2021-11-01 | End: 2022-04-06

## 2021-11-01 RX ORDER — ESTRADIOL 0.1 MG/D
1 FILM, EXTENDED RELEASE TRANSDERMAL
Qty: 8 PATCH | Refills: 11 | Status: SHIPPED | OUTPATIENT
Start: 2021-11-01 | End: 2022-04-06

## 2021-11-03 LAB
FINAL PATHOLOGIC DIAGNOSIS: NORMAL
Lab: NORMAL

## 2021-11-08 ENCOUNTER — OFFICE VISIT (OUTPATIENT)
Dept: PSYCHIATRY | Facility: CLINIC | Age: 56
End: 2021-11-08
Payer: COMMERCIAL

## 2021-11-08 DIAGNOSIS — F41.9 ANXIETY: ICD-10-CM

## 2021-11-08 DIAGNOSIS — F32.1 CURRENT MODERATE EPISODE OF MAJOR DEPRESSIVE DISORDER WITHOUT PRIOR EPISODE: Primary | ICD-10-CM

## 2021-11-08 PROCEDURE — 90792 PR PSYCHIATRIC DIAGNOSTIC EVALUATION W/MEDICAL SERVICES: ICD-10-PCS | Mod: 95,,, | Performed by: NURSE PRACTITIONER

## 2021-11-08 PROCEDURE — 90792 PSYCH DIAG EVAL W/MED SRVCS: CPT | Mod: 95,,, | Performed by: NURSE PRACTITIONER

## 2021-11-08 PROCEDURE — 3044F HG A1C LEVEL LT 7.0%: CPT | Mod: CPTII,95,, | Performed by: NURSE PRACTITIONER

## 2021-11-08 PROCEDURE — 3044F PR MOST RECENT HEMOGLOBIN A1C LEVEL <7.0%: ICD-10-PCS | Mod: CPTII,95,, | Performed by: NURSE PRACTITIONER

## 2022-01-26 RX ORDER — MISOPROSTOL 200 UG/1
TABLET ORAL
Qty: 2 TABLET | Refills: 0 | Status: SHIPPED | OUTPATIENT
Start: 2022-01-26 | End: 2022-02-03

## 2022-01-26 NOTE — TELEPHONE ENCOUNTER
----- Message from Keshia Marino MD sent at 1/26/2022  7:08 AM CST -----  Needs endometrial biopsy and please call in [facility]  You can add her Friday at 11am

## 2022-01-27 ENCOUNTER — PATIENT MESSAGE (OUTPATIENT)
Dept: OBSTETRICS AND GYNECOLOGY | Facility: CLINIC | Age: 57
End: 2022-01-27
Payer: COMMERCIAL

## 2022-01-28 ENCOUNTER — PATIENT MESSAGE (OUTPATIENT)
Dept: OBSTETRICS AND GYNECOLOGY | Facility: CLINIC | Age: 57
End: 2022-01-28
Payer: COMMERCIAL

## 2022-02-01 ENCOUNTER — TELEPHONE (OUTPATIENT)
Dept: HEMATOLOGY/ONCOLOGY | Facility: CLINIC | Age: 57
End: 2022-02-01
Payer: COMMERCIAL

## 2022-02-03 ENCOUNTER — PROCEDURE VISIT (OUTPATIENT)
Dept: OBSTETRICS AND GYNECOLOGY | Facility: CLINIC | Age: 57
End: 2022-02-03
Attending: OBSTETRICS & GYNECOLOGY
Payer: COMMERCIAL

## 2022-02-03 VITALS
SYSTOLIC BLOOD PRESSURE: 124 MMHG | DIASTOLIC BLOOD PRESSURE: 79 MMHG | WEIGHT: 152.81 LBS | BODY MASS INDEX: 27.07 KG/M2 | HEIGHT: 63 IN

## 2022-02-03 DIAGNOSIS — N95.0 PMB (POSTMENOPAUSAL BLEEDING): Primary | ICD-10-CM

## 2022-02-03 PROCEDURE — 58100 BIOPSY OF UTERUS LINING: CPT | Mod: S$GLB,,, | Performed by: OBSTETRICS & GYNECOLOGY

## 2022-02-03 PROCEDURE — 88305 TISSUE EXAM BY PATHOLOGIST: CPT | Mod: 26,,, | Performed by: PATHOLOGY

## 2022-02-03 PROCEDURE — 88305 TISSUE EXAM BY PATHOLOGIST: ICD-10-PCS | Mod: 26,,, | Performed by: PATHOLOGY

## 2022-02-03 PROCEDURE — 88305 TISSUE EXAM BY PATHOLOGIST: CPT | Performed by: PATHOLOGY

## 2022-02-03 PROCEDURE — 58100 PR BIOPSY OF UTERUS LINING: ICD-10-PCS | Mod: S$GLB,,, | Performed by: OBSTETRICS & GYNECOLOGY

## 2022-02-03 NOTE — PROCEDURES
Endometrial Biopsy- Today    Date/Time: 2/3/2022 3:15 PM  Performed by: Keshia Marino MD  Authorized by: Keshia Marino MD     Consent:     Consent obtained:  Written    Consent given by:  Patient    Procedural risks discussed:  Bleeding, failure rate and infection    Patient questions answered: yes      Patient agrees, verbalizes understanding, and wants to proceed: yes      Educational handouts given: yes      Instructions and paperwork completed: yes    Indication:     Indications: Post-menopausal bleeding      Chronicity of post-menopausal bleeding:  Recurrent    Progression of post-menopausal bleeding:  Waxing and waning  Pre-procedure:     Pre-procedure timeout performed: yes    Procedure:     A bivalve speculum was placed in the vagina: yes      Cervix cleaned and prepped: yes      A paracervical block was performed: no      An intracervical block was performed: no      The cervix was dilated: no      Uterus sounded: yes      Uterus sound depth (cm):  9    Specimen collected: specimen collected and sent to pathology      Patient tolerated procedure well with no complications: yes

## 2022-02-11 LAB
FINAL PATHOLOGIC DIAGNOSIS: NORMAL
Lab: NORMAL

## 2022-02-21 ENCOUNTER — PATIENT MESSAGE (OUTPATIENT)
Dept: RESEARCH | Facility: HOSPITAL | Age: 57
End: 2022-02-21
Payer: COMMERCIAL

## 2022-04-25 ENCOUNTER — PATIENT MESSAGE (OUTPATIENT)
Dept: INTERNAL MEDICINE | Facility: CLINIC | Age: 57
End: 2022-04-25
Payer: COMMERCIAL

## 2022-04-25 DIAGNOSIS — E78.00 HYPERCHOLESTEROLEMIA: ICD-10-CM

## 2022-04-25 DIAGNOSIS — R11.0 NAUSEA: ICD-10-CM

## 2022-04-25 RX ORDER — ONDANSETRON 8 MG/1
8 TABLET, ORALLY DISINTEGRATING ORAL EVERY 6 HOURS PRN
Qty: 15 TABLET | Refills: 1 | Status: SHIPPED | OUTPATIENT
Start: 2022-04-25 | End: 2023-06-06 | Stop reason: SDUPTHER

## 2022-04-25 RX ORDER — ATORVASTATIN CALCIUM 40 MG/1
40 TABLET, FILM COATED ORAL DAILY
Qty: 90 TABLET | Refills: 3 | Status: SHIPPED | OUTPATIENT
Start: 2022-04-25 | End: 2023-04-24

## 2022-04-25 NOTE — TELEPHONE ENCOUNTER
No new care gaps identified.  Powered by [employer]. Reference number: [id number].   4/25/2022 1:18:24 PM CDT

## 2022-04-29 ENCOUNTER — TELEPHONE (OUTPATIENT)
Dept: URGENT CARE | Facility: CLINIC | Age: 57
End: 2022-04-29

## 2022-04-29 ENCOUNTER — OFFICE VISIT (OUTPATIENT)
Dept: URGENT CARE | Facility: CLINIC | Age: 57
End: 2022-04-29
Payer: COMMERCIAL

## 2022-04-29 VITALS
HEIGHT: 64 IN | RESPIRATION RATE: 16 BRPM | WEIGHT: 148 LBS | TEMPERATURE: 97 F | OXYGEN SATURATION: 100 % | HEART RATE: 86 BPM | SYSTOLIC BLOOD PRESSURE: 145 MMHG | DIASTOLIC BLOOD PRESSURE: 96 MMHG | BODY MASS INDEX: 25.27 KG/M2

## 2022-04-29 DIAGNOSIS — S00.411A ABRASION OF RIGHT EAR CANAL, INITIAL ENCOUNTER: ICD-10-CM

## 2022-04-29 DIAGNOSIS — R05.9 COUGH: ICD-10-CM

## 2022-04-29 DIAGNOSIS — J06.9 UPPER RESPIRATORY TRACT INFECTION, UNSPECIFIED TYPE: ICD-10-CM

## 2022-04-29 DIAGNOSIS — J02.9 SORE THROAT: Primary | ICD-10-CM

## 2022-04-29 LAB
CTP QC/QA: YES
CTP QC/QA: YES
POC MOLECULAR INFLUENZA A AGN: NEGATIVE
POC MOLECULAR INFLUENZA B AGN: NEGATIVE
SARS-COV-2 RDRP RESP QL NAA+PROBE: NEGATIVE

## 2022-04-29 PROCEDURE — 99214 OFFICE O/P EST MOD 30 MIN: CPT | Mod: S$GLB,,, | Performed by: NURSE PRACTITIONER

## 2022-04-29 PROCEDURE — 71046 XR CHEST PA AND LATERAL: ICD-10-PCS | Mod: FY,S$GLB,, | Performed by: RADIOLOGY

## 2022-04-29 PROCEDURE — 3008F PR BODY MASS INDEX (BMI) DOCUMENTED: ICD-10-PCS | Mod: CPTII,S$GLB,, | Performed by: NURSE PRACTITIONER

## 2022-04-29 PROCEDURE — 71046 X-RAY EXAM CHEST 2 VIEWS: CPT | Mod: FY,S$GLB,, | Performed by: RADIOLOGY

## 2022-04-29 PROCEDURE — 3080F DIAST BP >= 90 MM HG: CPT | Mod: CPTII,S$GLB,, | Performed by: NURSE PRACTITIONER

## 2022-04-29 PROCEDURE — U0002: ICD-10-PCS | Mod: QW,S$GLB,, | Performed by: NURSE PRACTITIONER

## 2022-04-29 PROCEDURE — 3077F SYST BP >= 140 MM HG: CPT | Mod: CPTII,S$GLB,, | Performed by: NURSE PRACTITIONER

## 2022-04-29 PROCEDURE — 87502 INFLUENZA DNA AMP PROBE: CPT | Mod: QW,S$GLB,, | Performed by: NURSE PRACTITIONER

## 2022-04-29 PROCEDURE — 3077F PR MOST RECENT SYSTOLIC BLOOD PRESSURE >= 140 MM HG: ICD-10-PCS | Mod: CPTII,S$GLB,, | Performed by: NURSE PRACTITIONER

## 2022-04-29 PROCEDURE — U0002 COVID-19 LAB TEST NON-CDC: HCPCS | Mod: QW,S$GLB,, | Performed by: NURSE PRACTITIONER

## 2022-04-29 PROCEDURE — 87502 POCT INFLUENZA A/B MOLECULAR: ICD-10-PCS | Mod: QW,S$GLB,, | Performed by: NURSE PRACTITIONER

## 2022-04-29 PROCEDURE — 1159F PR MEDICATION LIST DOCUMENTED IN MEDICAL RECORD: ICD-10-PCS | Mod: CPTII,S$GLB,, | Performed by: NURSE PRACTITIONER

## 2022-04-29 PROCEDURE — 99214 PR OFFICE/OUTPT VISIT, EST, LEVL IV, 30-39 MIN: ICD-10-PCS | Mod: S$GLB,,, | Performed by: NURSE PRACTITIONER

## 2022-04-29 PROCEDURE — 3008F BODY MASS INDEX DOCD: CPT | Mod: CPTII,S$GLB,, | Performed by: NURSE PRACTITIONER

## 2022-04-29 PROCEDURE — 1159F MED LIST DOCD IN RCRD: CPT | Mod: CPTII,S$GLB,, | Performed by: NURSE PRACTITIONER

## 2022-04-29 PROCEDURE — 1160F PR REVIEW ALL MEDS BY PRESCRIBER/CLIN PHARMACIST DOCUMENTED: ICD-10-PCS | Mod: CPTII,S$GLB,, | Performed by: NURSE PRACTITIONER

## 2022-04-29 PROCEDURE — 3080F PR MOST RECENT DIASTOLIC BLOOD PRESSURE >= 90 MM HG: ICD-10-PCS | Mod: CPTII,S$GLB,, | Performed by: NURSE PRACTITIONER

## 2022-04-29 PROCEDURE — 1160F RVW MEDS BY RX/DR IN RCRD: CPT | Mod: CPTII,S$GLB,, | Performed by: NURSE PRACTITIONER

## 2022-04-29 RX ORDER — OFLOXACIN 3 MG/ML
5 SOLUTION AURICULAR (OTIC) 2 TIMES DAILY
Qty: 10 ML | Refills: 0 | Status: SHIPPED | OUTPATIENT
Start: 2022-04-29 | End: 2022-05-06

## 2022-04-29 NOTE — TELEPHONE ENCOUNTER
Left a message for patient to call back to [facility] [place] at [phone].  A follow-up call was placed to discuss normal results for chest x-ray.  Will try patient later.

## 2022-04-29 NOTE — PROGRESS NOTES
"Subjective:       Patient ID: Corinne Morrison Marcus is a 56 y.o. female.    Vitals:  height is 5' 4" (1.626 m) and weight is 67.1 kg (148 lb). Her tympanic temperature is 96.8 °F (36 °C). Her blood pressure is 145/96 (abnormal) and her pulse is 86. Her respiration is 16 and oxygen saturation is 100%.     Chief Complaint: Sinus Problem    Pt presents with c o chest congestion, sore throat, pain with swallowing, fatigue, body aches, productive cough x  3 days. No hx of covid infection in last 90 days. Covid vaccinated. Treating sx with lozenges, dayquil, motrin, tylenol    Sinus Problem  This is a new problem. The current episode started in the past 7 days. There has been no fever. Associated symptoms include coughing, ear pain (ear fullness on both sides ) and a sore throat. Pertinent negatives include no chills, congestion, diaphoresis, headaches, neck pain, shortness of breath or sinus pressure. Past treatments include oral decongestants and acetaminophen.       Constitution: Negative for chills, sweating, fatigue, fever and generalized weakness.   HENT: Positive for ear pain (ear fullness on both sides ), sore throat and voice change. Negative for ear discharge, congestion, postnasal drip, sinus pain and sinus pressure.    Neck: Negative for neck pain and neck stiffness.   Cardiovascular: Negative for chest pain, palpitations and sob on exertion.   Respiratory: Positive for cough and sputum production (yellowish sputum ). Negative for chest tightness, shortness of breath and wheezing.    Gastrointestinal: Positive for nausea. Negative for abdominal pain, vomiting and diarrhea.   Musculoskeletal: Negative for muscle ache.   Neurological: Negative for dizziness, light-headedness and headaches.       Objective:      Physical Exam   Constitutional: She is oriented to person, place, and time. She appears well-developed. She is cooperative.  Non-toxic appearance. She does not appear ill. No distress.   HENT:   Head: " ----- Message from Gladis Manrique RT sent at 1/30/2017  9:11 AM CST -----  Contact: pt    pt , requesting labs prior to his appt of 02/16/2017: Cholesterol  and liver enzymes, tests, thanks.   Normocephalic and atraumatic.   Ears:   Right Ear: Hearing, tympanic membrane, external ear and ear canal normal. No drainage, swelling or tenderness. Tympanic membrane is not injected, not erythematous and not bulging. No middle ear effusion. No decreased hearing is noted.   Left Ear: Hearing, tympanic membrane, external ear and ear canal normal. No drainage, swelling or tenderness. Tympanic membrane is not injected, not erythematous and not bulging.  No middle ear effusion. No decreased hearing is noted.   Ears:    Nose: Congestion present. No mucosal edema, rhinorrhea or nasal deformity. No epistaxis. Right sinus exhibits no maxillary sinus tenderness and no frontal sinus tenderness. Left sinus exhibits no maxillary sinus tenderness and no frontal sinus tenderness.   Mouth/Throat: Uvula is midline and mucous membranes are normal. Mucous membranes are moist. No trismus in the jaw. Normal dentition. No uvula swelling. Posterior oropharyngeal erythema present. No oropharyngeal exudate or posterior oropharyngeal edema. Oropharynx is clear.       Eyes: Conjunctivae and lids are normal. Pupils are equal, round, and reactive to light. No scleral icterus.      extraocular movement intact   Neck: Trachea normal and phonation normal. Neck supple. No edema present. No erythema present. No neck rigidity present.   Cardiovascular: Normal rate, regular rhythm, S1 normal, S2 normal, normal heart sounds and normal pulses. Exam reveals no decreased pulses.   Pulmonary/Chest: Effort normal and breath sounds normal. No accessory muscle usage. No respiratory distress. She has no decreased breath sounds. She has no wheezes. She has no rhonchi. She has no rales.   Abdominal: Normal appearance.   Musculoskeletal: Normal range of motion.         General: No deformity. Normal range of motion.   Lymphadenopathy:     She has no cervical adenopathy.   Neurological: She is alert and oriented to person, place, and time. She exhibits normal  muscle tone. Coordination normal.   Skin: Skin is warm, dry, intact, not diaphoretic and not pale.   Psychiatric: Her speech is normal and behavior is normal. Judgment and thought content normal.   Nursing note and vitals reviewed.        Results for orders placed or performed in visit on 04/29/22   POCT COVID-19 Rapid Screening   Result Value Ref Range    POC Rapid COVID Negative Negative     Acceptable Yes    POCT Influenza A/B MOLECULAR   Result Value Ref Range    POC Molecular Influenza A Ag Negative Negative, Not Reported    POC Molecular Influenza B Ag Negative Negative, Not Reported     Acceptable Yes           Assessment:       1. Sore throat    2. Upper respiratory tract infection, unspecified type    3. Abrasion of right ear canal, initial encounter    4. Cough          Plan:       Reviewed negative COVID-19 virus and flu test with patient who acknowledged results.  Patient informed that the chest x-ray is pending at this time will call her with the final results.  In the meantime, patient informed that her symptoms are indicative of an upper respiratory infection which is viral in nature and is most likely related to the sore throat.  We discussed over-the-counter medications to help with those symptoms.  In regards to the abrasion of the right ear.  A wait and see antibiotic for ear drops was given and instructions regarding medication administration was also reviewed with patient.  Patient verbalized understanding and agree with plan of care.  She denies any further questions or concerns at this time.  Patient exits exam room in no acute distress.  Sore throat  -     POCT COVID-19 Rapid Screening  -     POCT Influenza A/B MOLECULAR    Upper respiratory tract infection, unspecified type    Abrasion of right ear canal, initial encounter    Cough  -     X-Ray Chest PA And Lateral; Future; Expected date: 04/29/2022    Other orders  -     ofloxacin (FLOXIN) 0.3 % otic solution;  "Place 5 drops into both ears 2 (two) times daily. for 7 days  Dispense: 10 mL; Refill: 0      Patient Instructions   Your test was NEGATIVE for COVID-19 (coronavirus) and FLU.      You may leave home and/or return to work when the following conditions are met:   24 hours fever free without fever-reducing medications AND   Improved symptoms      You have been scheduled for a Chest Xray; we will complete this test and let you go home then call you should the results be positive, you will also be able to see your results in real-time on My Chart. There is no reason for you to extend your stay here as I can order  antibiotics afterwards as well.Your test results for your xray are pending.                                                               URI   If your condition worsens or fails to improve we recommend that you receive another evaluation at the ER immediately or contact your PCP to discuss your concerns or return here. You must understand that you've received an urgent care treatment only and that you may be released before all your medical problems are known or treated. [facility].   If we discussed that I think your illness is viral, it will not respond to antibiotics and will last 5-7 days. If we discussed "wait and see" antibiotics and if over the next few days the symptoms worsen start the antibiotics I have given you.   -  If you are female and on BCP and do take the antibiotics, use additional methods to prevent pregnancy while on the antibiotics and for one cycle after.   -  Flonase (fluticasone) is a nasal spray which is available over the counter and may help with your symptoms.   -  Zyrtec D, Claritin D or Allegra D can also help with symptoms of congestion and drainage.   -  If you have hypertension avoid using the "D" which is the decongestant.  Instead you can use Coricidin HBP for cold and cough symptoms.    -  If you just have drainage you " can take plain Zyrtec, Claritin or Allegra   -  If you just have a congested feeling you can take pseudoephedrine (unless you have high blood pressure) which you have to sign for behind the counter. Do not buy the phenylephrine which is on the shelf as it is not effective   -  Rest and fluids are also important.   -  Tylenol or ibuprofen can also be used as directed for pain unless you have an allergy to them or medical condition such as stomach ulcers, kidney or liver disease or blood thinners etc for which you should not be taking these type of medications.   -  If you are flying in the next few days Afrin nose drops for the airplane flight upon take off and landing may help. Other than at those times refrain from using afrin.   - If you were prescribed a narcotic do not drive or operate heavy machinery while taking these medications.        Ear Condition:  See Wait and See Plan for Ear Drops.  Full course of antibiotic ear drops as prescribed.  Avoid cleaning ears with any foreign objects; use over the counter Debrox as directed.   Tylenol or Motrin every 4 - 6 hours as needed for  ear pain.  Follow up with your PCP in 2-3 of initiating antibiotic ear drops or sooner for no improvement in symptoms  Follow up in the ER for any worsening of symptoms such as new fever, increasing ear pain, neck stiffness, etc.  If you smoke, stop smoking.     You have received a WASP (Wait and See Prescription) of antibiotics.  Usually this is in case your symptoms worsen (new fever, ear pain or discharge).  The overall goal is to give our symptomatic treatment at least 48 -72 hrs to improve your symptoms.  If you do begin taking the antibiotics, please take them to completion.     Also be aware that antibiotics may interfere with your  Birth control. If you think you may be developing a yeast infection, use over the counter Monistat (if you are not allergic). You can also use probiotics to help with the side effects of the  antibiotics.     Do not take these antibiotics for any other illnesses as they may be inappropriate and may lead to resistance.   We are working extremely hard to limit antibiotic use to prevent resistance                                                           Pharyngitis   If your condition worsens or fails to improve we recommend that you receive another evaluation at the ER immediately or contact your PCP to discuss your concerns or return here. You must understand that you've received an urgent care treatment only and that you may be released before all your medical problems are known or treated. You the patient will arrange for followup care as instructed.   The majority of all sore throats or tonsillitis are viral and antibiotics will not treat this.     If the strep test performed in office was Positive:  - Complete the full course of antibiotics given  - Drink plenty of cool liquids while avoid any beverage or food that can irritate your throat (acidic, spicy or salty foods).  - Throw away your toothbrush now and when you complete your antibiotics.    If the strep culture was done and returns negative in 3-5 days and you are still having a sore throat, you may need to get a mono spot test done or repeated.   Tylenol or ibuprofen for pain may help as long as you are not allergic to these meds or have a medical condition such as stomach ulcers, liver or kidney disease or taking blood thinners etc that would   prevent you from using these medications.   Rest and fluids will help as well.   If you were prescribed antibiotics and are female and on BCP use additional methods to prevent pregnancy while on the antibiotics and for one cycle after.

## 2022-04-29 NOTE — PATIENT INSTRUCTIONS
"Your test was NEGATIVE for COVID-19 (coronavirus) and FLU.      You may leave home and/or return to work when the following conditions are met:  24 hours fever free without fever-reducing medications AND  Improved symptoms      You have been scheduled for a Chest Xray; we will complete this test and let you go home then call you should the results be positive, you will also be able to see your results in real-time on My Chart. There is no reason for you to extend your stay here as I can order  antibiotics afterwards as well.Your test results for your xray are pending.                                                               URI   If your condition worsens or fails to improve we recommend that you receive another evaluation at the ER immediately or contact your PCP to discuss your concerns or return here. You must understand that you've received an urgent care treatment only and that you may be released before all your medical problems are known or treated. You the patient will arrange for follouwp care as instructed.   If we discussed that I think your illness is viral, it will not respond to antibiotics and will last 5-7 days. If we discussed "wait and see" antibiotics and if over the next few days the symptoms worsen start the antibiotics I have given you.   -  If you are female and on BCP and do take the antibiotics, use additional methods to prevent pregnancy while on the antibiotics and for one cycle after.   -  Flonase (fluticasone) is a nasal spray which is available over the counter and may help with your symptoms.   -  Zyrtec D, Claritin D or Allegra D can also help with symptoms of congestion and drainage.   -  If you have hypertension avoid using the "D" which is the decongestant.  Instead you can use Coricidin HBP for cold and cough symptoms.    -  If you just have drainage you can take plain Zyrtec, Claritin or Allegra   -  If you just have a congested feeling you can take pseudoephedrine (unless " you have high blood pressure) which you have to sign for behind the counter. Do not buy the phenylephrine which is on the shelf as it is not effective   -  Rest and fluids are also important.   -  Tylenol or ibuprofen can also be used as directed for pain unless you have an allergy to them or medical condition such as stomach ulcers, kidney or liver disease or blood thinners etc for which you should not be taking these type of medications.   -  If you are flying in the next few days Afrin nose drops for the airplane flight upon take off and landing may help. Other than at those times refrain from using afrin.   - If you were prescribed a narcotic do not drive or operate heavy machinery while taking these medications.        Ear Condition:  See Wait and See Plan for Ear Drops.  Full course of antibiotic ear drops as prescribed.  Avoid cleaning ears with any foreign objects; use over the counter Debrox as directed.   Tylenol or Motrin every 4 - 6 hours as needed for  ear pain.  Follow up with your PCP in 2-3 of initiating antibiotic ear drops or sooner for no improvement in symptoms  Follow up in the ER for any worsening of symptoms such as new fever, increasing ear pain, neck stiffness, etc.  If you smoke, stop smoking.     You have received a WASP (Wait and See Prescription) of antibiotics.  Usually this is in case your symptoms worsen (new fever, ear pain or discharge).  The overall goal is to give our symptomatic treatment at least 48 -72 hrs to improve your symptoms.  If you do begin taking the antibiotics, please take them to completion.     Also be aware that antibiotics may interfere with your  Birth control. If you think you may be developing a yeast infection, use over the counter Monistat (if you are not allergic). You can also use probiotics to help with the side effects of the antibiotics.     Do not take these antibiotics for any other illnesses as they may be inappropriate and may lead to resistance.    We are working extremely hard to limit antibiotic use to prevent resistance                                                           Pharyngitis   If your condition worsens or fails to improve we recommend that you receive another evaluation at the ER immediately or contact your PCP to discuss your concerns or return here. You must understand that you've received an urgent care treatment only and that you may be released before all your medical problems are known or treated. You the patient will arrange for followup care as instructed.   The majority of all sore throats or tonsillitis are viral and antibiotics will not treat this.     If the strep test performed in office was Positive:  - Complete the full course of antibiotics given  - Drink plenty of cool liquids while avoid any beverage or food that can irritate your throat (acidic, spicy or salty foods).  - Throw away your toothbrush now and when you complete your antibiotics.    If the strep culture was done and returns negative in 3-5 days and you are still having a sore throat, you may need to get a mono spot test done or repeated.   Tylenol or ibuprofen for pain may help as long as you are not allergic to these meds or have a medical condition such as stomach ulcers, liver or kidney disease or taking blood thinners etc that would   prevent you from using these medications.   Rest and fluids will help as well.   If you were prescribed antibiotics and are female and on BCP use additional methods to prevent pregnancy while on the antibiotics and for one cycle after.

## 2022-05-13 ENCOUNTER — PATIENT MESSAGE (OUTPATIENT)
Dept: SURGERY | Facility: CLINIC | Age: 57
End: 2022-05-13
Payer: COMMERCIAL

## 2022-05-20 ENCOUNTER — PATIENT MESSAGE (OUTPATIENT)
Dept: OBSTETRICS AND GYNECOLOGY | Facility: CLINIC | Age: 57
End: 2022-05-20
Payer: COMMERCIAL

## 2022-06-01 PROBLEM — N62 MACROMASTIA: Status: ACTIVE | Noted: 2022-06-01

## 2022-06-08 ENCOUNTER — TELEPHONE (OUTPATIENT)
Dept: OBSTETRICS AND GYNECOLOGY | Facility: CLINIC | Age: 57
End: 2022-06-08
Payer: COMMERCIAL

## 2022-06-08 NOTE — TELEPHONE ENCOUNTER
----- Message from Ajay Vides sent at 6/8/2022 12:13 PM CDT -----  Contact: [facility] office  Delmy from doctor Cherry office requesting a call back RE: Dr. Molina is requesting that the Mri of right lower breast be looked at again. Due to findings.      Confirmed contact below:  Contact Name:Dr. Molina  Phone Number: [phone]

## 2022-06-08 NOTE — TELEPHONE ENCOUNTER
Spoke with Dr Gutierrez and she was trying to get intouch with the radiologist that read pt's mammogram not Dr Marino

## 2022-06-09 ENCOUNTER — TELEPHONE (OUTPATIENT)
Dept: HEMATOLOGY/ONCOLOGY | Facility: CLINIC | Age: 57
End: 2022-06-09
Payer: COMMERCIAL

## 2022-06-09 DIAGNOSIS — C50.911 MALIGNANT NEOPLASM OF RIGHT BREAST IN FEMALE, ESTROGEN RECEPTOR POSITIVE, UNSPECIFIED SITE OF BREAST: Primary | ICD-10-CM

## 2022-06-09 DIAGNOSIS — Z17.0 MALIGNANT NEOPLASM OF RIGHT BREAST IN FEMALE, ESTROGEN RECEPTOR POSITIVE, UNSPECIFIED SITE OF BREAST: Primary | ICD-10-CM

## 2022-06-09 NOTE — TELEPHONE ENCOUNTER
----- Message from Nelia Frazier MD sent at 6/9/2022 12:56 PM CDT -----  Please help me schedule this MRI asap  Thank you!!

## 2022-06-10 ENCOUNTER — HOSPITAL ENCOUNTER (OUTPATIENT)
Dept: RADIOLOGY | Facility: HOSPITAL | Age: 57
Discharge: HOME | End: 2022-06-10
Attending: INTERNAL MEDICINE
Payer: COMMERCIAL

## 2022-06-10 DIAGNOSIS — C50.911 MALIGNANT NEOPLASM OF RIGHT BREAST IN FEMALE, ESTROGEN RECEPTOR POSITIVE, UNSPECIFIED SITE OF BREAST: ICD-10-CM

## 2022-06-10 DIAGNOSIS — Z17.0 MALIGNANT NEOPLASM OF RIGHT BREAST IN FEMALE, ESTROGEN RECEPTOR POSITIVE, UNSPECIFIED SITE OF BREAST: ICD-10-CM

## 2022-06-10 PROCEDURE — 77049 MRI BREAST W/WO CONTRAST, W/CAD, BILATERAL: ICD-10-PCS | Mod: 26,,, | Performed by: RADIOLOGY

## 2022-06-10 PROCEDURE — 77049 MRI BREAST C-+ W/CAD BI: CPT | Mod: 26,,, | Performed by: RADIOLOGY

## 2022-06-10 PROCEDURE — A9577 INJ MULTIHANCE: HCPCS | Performed by: INTERNAL MEDICINE

## 2022-06-10 PROCEDURE — 25500020 PHARM REV CODE 255: Performed by: INTERNAL MEDICINE

## 2022-06-10 PROCEDURE — 77049 MRI BREAST C-+ W/CAD BI: CPT | Mod: TC

## 2022-06-10 RX ADMIN — GADOBENATE DIMEGLUMINE 15 ML: 529 INJECTION, SOLUTION INTRAVENOUS at 09:06

## 2022-06-23 ENCOUNTER — TELEPHONE (OUTPATIENT)
Dept: HEMATOLOGY/ONCOLOGY | Facility: CLINIC | Age: 57
End: 2022-06-23
Payer: COMMERCIAL

## 2022-06-23 NOTE — TELEPHONE ENCOUNTER
Received message from  to assist with a new pt appt. Called pt, no answer, left voicemail. Tentatively scheduled for Monday at 1pm. Will await return call to confirm.

## 2022-06-28 NOTE — TELEPHONE ENCOUNTER
Appt changed due to scheduling conflict. Will see  on 7-11. Oncotype in progress, ordered on 6-27 by .

## 2022-07-11 ENCOUNTER — OFFICE VISIT (OUTPATIENT)
Dept: HEMATOLOGY/ONCOLOGY | Facility: CLINIC | Age: 57
End: 2022-07-11
Payer: COMMERCIAL

## 2022-07-11 VITALS
RESPIRATION RATE: 16 BRPM | OXYGEN SATURATION: 98 % | HEIGHT: 64 IN | TEMPERATURE: 98 F | HEART RATE: 107 BPM | DIASTOLIC BLOOD PRESSURE: 85 MMHG | BODY MASS INDEX: 25.28 KG/M2 | SYSTOLIC BLOOD PRESSURE: 130 MMHG | WEIGHT: 148.06 LBS

## 2022-07-11 DIAGNOSIS — C50.911 BREAST CANCER METASTASIZED TO AXILLARY LYMPH NODE, RIGHT: ICD-10-CM

## 2022-07-11 DIAGNOSIS — N64.4 BREAST PAIN: ICD-10-CM

## 2022-07-11 DIAGNOSIS — C50.911 MALIGNANT NEOPLASM OF RIGHT BREAST IN FEMALE, ESTROGEN RECEPTOR POSITIVE, UNSPECIFIED SITE OF BREAST: Primary | ICD-10-CM

## 2022-07-11 DIAGNOSIS — C77.3 BREAST CANCER METASTASIZED TO AXILLARY LYMPH NODE, RIGHT: ICD-10-CM

## 2022-07-11 DIAGNOSIS — Z17.0 MALIGNANT NEOPLASM OF RIGHT BREAST IN FEMALE, ESTROGEN RECEPTOR POSITIVE, UNSPECIFIED SITE OF BREAST: Primary | ICD-10-CM

## 2022-07-11 PROCEDURE — 1159F MED LIST DOCD IN RCRD: CPT | Mod: CPTII,S$GLB,, | Performed by: INTERNAL MEDICINE

## 2022-07-11 PROCEDURE — 99204 PR OFFICE/OUTPT VISIT, NEW, LEVL IV, 45-59 MIN: ICD-10-PCS | Mod: S$GLB,,, | Performed by: INTERNAL MEDICINE

## 2022-07-11 PROCEDURE — 3079F DIAST BP 80-89 MM HG: CPT | Mod: CPTII,S$GLB,, | Performed by: INTERNAL MEDICINE

## 2022-07-11 PROCEDURE — 1160F PR REVIEW ALL MEDS BY PRESCRIBER/CLIN PHARMACIST DOCUMENTED: ICD-10-PCS | Mod: CPTII,S$GLB,, | Performed by: INTERNAL MEDICINE

## 2022-07-11 PROCEDURE — 3075F SYST BP GE 130 - 139MM HG: CPT | Mod: CPTII,S$GLB,, | Performed by: INTERNAL MEDICINE

## 2022-07-11 PROCEDURE — 1160F RVW MEDS BY RX/DR IN RCRD: CPT | Mod: CPTII,S$GLB,, | Performed by: INTERNAL MEDICINE

## 2022-07-11 PROCEDURE — 3008F BODY MASS INDEX DOCD: CPT | Mod: CPTII,S$GLB,, | Performed by: INTERNAL MEDICINE

## 2022-07-11 PROCEDURE — 99999 PR PBB SHADOW E&M-EST. PATIENT-LVL IV: CPT | Mod: PBBFAC,,, | Performed by: INTERNAL MEDICINE

## 2022-07-11 PROCEDURE — 3075F PR MOST RECENT SYSTOLIC BLOOD PRESS GE 130-139MM HG: ICD-10-PCS | Mod: CPTII,S$GLB,, | Performed by: INTERNAL MEDICINE

## 2022-07-11 PROCEDURE — 99204 OFFICE O/P NEW MOD 45 MIN: CPT | Mod: S$GLB,,, | Performed by: INTERNAL MEDICINE

## 2022-07-11 PROCEDURE — 99999 PR PBB SHADOW E&M-EST. PATIENT-LVL IV: ICD-10-PCS | Mod: PBBFAC,,, | Performed by: INTERNAL MEDICINE

## 2022-07-11 PROCEDURE — 1159F PR MEDICATION LIST DOCUMENTED IN MEDICAL RECORD: ICD-10-PCS | Mod: CPTII,S$GLB,, | Performed by: INTERNAL MEDICINE

## 2022-07-11 PROCEDURE — 3079F PR MOST RECENT DIASTOLIC BLOOD PRESSURE 80-89 MM HG: ICD-10-PCS | Mod: CPTII,S$GLB,, | Performed by: INTERNAL MEDICINE

## 2022-07-11 PROCEDURE — 3008F PR BODY MASS INDEX (BMI) DOCUMENTED: ICD-10-PCS | Mod: CPTII,S$GLB,, | Performed by: INTERNAL MEDICINE

## 2022-07-11 NOTE — ASSESSMENT & PLAN NOTE
Stage 1B IDC of right Breast, ER+, ME+, Node +,  HER2-, oncotype pending    Patient presents for newly diagnosed stage IB breast cancer. Found after breast reduction s/p b/l breast mastectomy and SLNB/ALND.     Tumor size:  1.8  Tumor type:  Right breast invasive ductal carcinoma  Tumor stgstrstastdstest:st st1st Lymph node status: 1/3 sentinel LN   Hormone status:  ER +, ME +  HER2 status: -ve  Other:  Rare focus of lymphovascular invasion    Discussed the need of oncotype to determine if patient would benefit from adjuvant chemotherapy in addition to hormone therapy. Will obtain baseline hormone levels to determine if she is post-menopausal. Will obtain PET scan to complete staging.

## 2022-07-11 NOTE — PROGRESS NOTES
25, 23, 21, 19, 14  29  Menses - 14, 6/17, perimenopausal - 1 year stopped 2022  GM - breast ca 70  Paternal aunt - breast ca

## 2022-07-11 NOTE — PROGRESS NOTES
[facility]  [facility]  Hematology/Medical Oncology Clinic       PATIENT: Corinne Morrison Marcus  MRN: [id number]  DATE: 7/11/2022    Reason for referral: Early stage breast cancer, Stage 1A    Initial History: Patient is a  56-year-old female w/ hx of HLD, depression, who underwent bilateral breast reduction due to macromastia.  Incidentally patient was found to have right breast invasive carcinoma during resection. Subsequently underwent b/l mastectomy with reconstruction + R SLNB and R ALND on 6/17/22.     Prior screening mammograms w/o mass.   MRI breast without any obvious masses or lymph node involvement.    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  Gyn Hx: Menses 13 y/o, post-menopausal 56 y/o  FH:  had metastatic melanoma, Maternal grandmother - breast ca 71 y/o  Paternal aunt - breast ca 49 y/o    Patient reports feeling well. Denies fevers, chills, n/v, abd pain. Breast healing well, although does have difficultly elevating right arm, and pain in her armpit.          Past Medical History:   Past Medical History:   Diagnosis Date    Abnormal Pap smear of cervix     Abnormal Pap smear of vagina     Anxiety     Constipation     Depression     Hemorrhoids     HLD (hyperlipidemia)     Lung nodule     Multinodular goiter 2010    s/p bx of 2 nodules in 2011 - benign       Past Surgical HIstory:   Past Surgical History:   Procedure Laterality Date    APPENDECTOMY      COLONOSCOPY      4 as of 2015 [facility], next due in 2025    COLONOSCOPY N/A 10/27/2021    Procedure: COLONOSCOPY;  Surgeon: Garland Whalen MD;  Location: [facility] ([address] [place] [address]);  Service: Colon and Rectal;  Laterality: N/A;  Covid test 10/24  Aakash, lorenza [id number]   10/25 arrival time confirmed with pt-rb    COLPOSCOPY  11/19/2014    EXCISIONAL HEMORRHOIDECTOMY      TOTAL REDUCTION MAMMOPLASTY      TOTAL REDUCTION MAMMOPLASTY Bilateral 6/1/2022    Procedure: MAMMOPLASTY, REDUCTION;  Surgeon: Dex Gutierrez MD;  Location: [facility];  Service: Plastics;  Laterality: Bilateral;       Family History:   Family History   Problem Relation Age of Onset    Breast cancer Maternal Grandmother 68    Hypertension Mother     Heart attack Mother     Lung cancer Father         lung, + tobacco    No Known Problems Brother     No Known Problems Daughter     Other Son         lymphedema    No Known Problems Son     No Known Problems Son     No Known Problems Daughter     Ovarian cancer Neg Hx     Colon cancer Neg Hx     Cancer Neg Hx        Social History:  reports that she has never smoked. She has never used smokeless tobacco. She reports current alcohol use. She reports that she does not use drugs.    Allergies:  Review of patient's allergies indicates:  No Known Allergies    Medications:  Current Outpatient Medications   Medication Sig Dispense Refill    atorvastatin (LIPITOR) 40 MG tablet Take 1 tablet (40 mg total) by mouth once daily. (Patient taking differently: Take 40 mg by mouth every morning.) 90 tablet 3    cetirizine (ZYRTEC) 5 MG tablet Take 5 mg by mouth once daily.      estradioL (VIVELLE-DOT) 0.1 mg/24 hr PTSW Place 1 patch onto the skin twice a week. 24 patch 3    fluticasone (FLONASE) 50 mcg/actuation nasal spray 1 spray by Each Nare route once daily.      ondansetron (ZOFRAN-ODT) 8 MG TbDL Take 1 tablet (8 mg total) by mouth every 6 (six) hours as needed (nausea). 15 tablet 1    progesterone (PROMETRIUM) 200 MG capsule Take 1 capsule (200 mg total) by mouth every evening. 90 capsule 3    sertraline (ZOLOFT) 100 MG tablet Take 1 tablet (100 mg total) by mouth once daily. (Patient taking differently:  "Take 100 mg by mouth every evening.) 90 tablet 3    spironolactone (ALDACTONE) 100 MG tablet Take 100 mg by mouth every morning.       No current facility-administered medications for this visit.       Review of Systems  Constitutional: Negative for fatigue. Negative for activity change, appetite change, chills, fever and unexpected weight change.   HENT: Negative for nosebleeds and sore throat.    Respiratory: Negative for cough, chest tightness, shortness of breath and wheezing.    Cardiovascular: Negative for leg swelling. Negative for palpitations.   Gastrointestinal: Negative for constipation. Negative for abdominal pain, diarrhea, nausea and vomiting.   Endocrine: Negative for cold intolerance and heat intolerance.   Musculoskeletal: Negative for arthralgias, joint swelling and myalgias.   Hematological: Negative for adenopathy.    ECOG Performance Status: 0   Objective:      Vitals:   Vitals:    07/11/22 1412   BP: 130/85   BP Location: Left arm   Patient Position: Sitting   BP Method: Large (Automatic)   Pulse: 107   Resp: 16   Temp: 98.3 °F (36.8 °C)   TempSrc: Oral   SpO2: 98%   Weight: 67.1 kg (148 lb 0.6 oz)   Height: 5' 4" (1.626 m)       Physical Exam  Constitutional:       Appearance: Normal appearance.   HENT:      Head: Normocephalic and atraumatic.      Mouth/Throat:      Mouth: Mucous membranes are moist.   Eyes:      Extraocular Movements: Extraocular movements intact.   Neck:      Thyroid: No thyroid tenderness.   Cardiovascular:      Rate and Rhythm: Normal rate and regular rhythm.      Pulses: Normal pulses.      Heart sounds: Normal heart sounds.   Breast: Recent breast reconstruction, gauze over nipples, no palpable mass. Cording rt axilla  Pulmonary:      Effort: Pulmonary effort is normal.      Breath sounds: Normal breath sounds.   Abdominal:      Palpations: Abdomen is soft.   Musculoskeletal:      Cervical back: Normal range of motion and neck supple.   Lymphadenopathy:      Cervical: " No cervical adenopathy.      Upper Body:      Right upper body: No supraclavicular or axillary adenopathy.      Left upper body: No supraclavicular or axillary adenopathy.   Skin:     General: Skin is warm.   Neurological:      General: No focal deficit present.      Mental Status: He is alert.    Laboratory Data:  No visits with results within 1 Week(s) from this visit.   Latest known visit with results is:   Hospital Outpatient Visit on 05/18/2022   Component Date Value Ref Range Status    WBC 05/18/2022 9.43  3.90 - 12.70 K/uL Final    RBC 05/18/2022 4.38  4.00 - 5.40 M/uL Final    Hemoglobin 05/18/2022 13.4  12.0 - 16.0 g/dL Final    Hematocrit 05/18/2022 39.1  37.0 - 48.5 % Final    MCV 05/18/2022 89  82 - 98 fL Final    MCH 05/18/2022 30.6  27.0 - 31.0 pg Final    MCHC 05/18/2022 34.3  32.0 - 36.0 g/dL Final    RDW 05/18/2022 12.4  11.5 - 14.5 % Final    Platelets 05/18/2022 356  150 - 450 K/uL Final    MPV 05/18/2022 8.8 (A) 9.2 - 12.9 fL Final    Sodium 05/18/2022 134 (A) 136 - 145 mmol/L Final    Potassium 05/18/2022 4.1  3.5 - 5.1 mmol/L Final    Chloride 05/18/2022 99  95 - 110 mmol/L Final    CO2 05/18/2022 27  22 - 31 mmol/L Final    Glucose 05/18/2022 88  70 - 110 mg/dL Final    Comment: The ADA recommends the following guidelines for fasting glucose:    Normal:       less than 100 mg/dL    Prediabetes:  100 mg/dL to 125 mg/dL    Diabetes:     126 mg/dL or higher      BUN 05/18/2022 16  7 - 18 mg/dL Final    Creatinine 05/18/2022 0.66  0.50 - 1.40 mg/dL Final    Calcium 05/18/2022 9.6  8.4 - 10.2 mg/dL Final    Anion Gap 05/18/2022 8  8 - 16 mmol/L Final    eGFR if African American 05/18/2022 >60  >60 mL/min/1.73 m^2 Final    eGFR if non African American 05/18/2022 >60  >60 mL/min/1.73 m^2 Final    Comment: Calculation used to obtain the estimated glomerular filtration  rate (eGFR) is the CKD-EPI equation.            Imaging: All pertinent imaging reviewed    Assessment and Plan         1. Malignant neoplasm of right breast in female, estrogen receptor positive, unspecified site of breast    2. Breast cancer metastasized to axillary lymph node, right        Problem List Items Addressed This Visit        Renal/    Breast pain    Current Assessment & Plan     Cording after mastectomy. Will refer to physical therapy.                   Oncology    Breast cancer metastasized to axillary lymph node, right    Current Assessment & Plan     Stage 1B IDC of right Breast, ER+, PA+, Node +,  HER2-, oncotype pending    Patient presents for newly diagnosed stage IB breast cancer. Found after breast reduction s/p b/l breast mastectomy and SLNB/ALND.     Tumor size:  1.8  Tumor type:  Right breast invasive ductal carcinoma  Tumor stgstrstastdstest:st st1st Lymph node status: 1/3 sentinel LN   Hormone status:  ER +, PA +  HER2 status: -ve  Other:  Rare focus of lymphovascular invasion    Discussed the need of oncotype to determine if patient would benefit from adjuvant chemotherapy in addition to hormone therapy. Will obtain baseline hormone levels to determine if she is post-menopausal. Will obtain PET scan to complete staging.                Other Visit Diagnoses     Malignant neoplasm of right breast in female, estrogen receptor positive, unspecified site of breast    -  Primary    Relevant Orders    NM PET CT Routine FDG    FOLLICLE STIMULATING HORMONE    ESTRADIOL            Katya Núñez MD  Hematology/Oncology Fellow PGY IV  [facility]    Reviewed in depth tumor characteristics  Awaiting Oncotype result to make decision regarding adjuvant chemotherapy  Endocrine therapy plan with Arimidex    Plans to travel and will return post 8/6/2022    Route Chart for Scheduling    Med Onc Chart Routing  Urgent    Follow up with physician . See me week of 8/8   Follow up with SHAUN    Infusion scheduling note    Injection scheduling note    Labs    Imaging    Pharmacy appointment    Other referrals

## 2022-07-29 ENCOUNTER — PATIENT MESSAGE (OUTPATIENT)
Dept: SURGERY | Facility: CLINIC | Age: 57
End: 2022-07-29
Payer: COMMERCIAL

## 2022-08-10 ENCOUNTER — TELEPHONE (OUTPATIENT)
Dept: HEMATOLOGY/ONCOLOGY | Facility: CLINIC | Age: 57
End: 2022-08-10
Payer: COMMERCIAL

## 2022-08-11 ENCOUNTER — OFFICE VISIT (OUTPATIENT)
Dept: HEMATOLOGY/ONCOLOGY | Facility: CLINIC | Age: 57
End: 2022-08-11
Payer: COMMERCIAL

## 2022-08-11 ENCOUNTER — LAB VISIT (OUTPATIENT)
Dept: LAB | Facility: HOSPITAL | Age: 57
End: 2022-08-11
Attending: INTERNAL MEDICINE
Payer: COMMERCIAL

## 2022-08-11 VITALS
SYSTOLIC BLOOD PRESSURE: 134 MMHG | DIASTOLIC BLOOD PRESSURE: 87 MMHG | HEART RATE: 83 BPM | TEMPERATURE: 98 F | HEIGHT: 64 IN | RESPIRATION RATE: 18 BRPM | BODY MASS INDEX: 25.67 KG/M2 | OXYGEN SATURATION: 98 % | WEIGHT: 150.38 LBS

## 2022-08-11 DIAGNOSIS — Z17.0 MALIGNANT NEOPLASM OF RIGHT BREAST IN FEMALE, ESTROGEN RECEPTOR POSITIVE, UNSPECIFIED SITE OF BREAST: Primary | ICD-10-CM

## 2022-08-11 DIAGNOSIS — C50.911 MALIGNANT NEOPLASM OF RIGHT BREAST IN FEMALE, ESTROGEN RECEPTOR POSITIVE, UNSPECIFIED SITE OF BREAST: Primary | ICD-10-CM

## 2022-08-11 DIAGNOSIS — C77.3 BREAST CANCER METASTASIZED TO AXILLARY LYMPH NODE, RIGHT: ICD-10-CM

## 2022-08-11 DIAGNOSIS — Z17.0 MALIGNANT NEOPLASM OF RIGHT BREAST IN FEMALE, ESTROGEN RECEPTOR POSITIVE, UNSPECIFIED SITE OF BREAST: ICD-10-CM

## 2022-08-11 DIAGNOSIS — C50.911 BREAST CANCER METASTASIZED TO AXILLARY LYMPH NODE, RIGHT: ICD-10-CM

## 2022-08-11 DIAGNOSIS — C50.911 MALIGNANT NEOPLASM OF RIGHT BREAST IN FEMALE, ESTROGEN RECEPTOR POSITIVE, UNSPECIFIED SITE OF BREAST: ICD-10-CM

## 2022-08-11 LAB
ALBUMIN SERPL BCP-MCNC: 4.1 G/DL (ref 3.5–5.2)
ALP SERPL-CCNC: 79 U/L (ref 55–135)
ALT SERPL W/O P-5'-P-CCNC: 20 U/L (ref 10–44)
ANION GAP SERPL CALC-SCNC: 8 MMOL/L (ref 8–16)
AST SERPL-CCNC: 19 U/L (ref 10–40)
BASOPHILS # BLD AUTO: 0.04 K/UL (ref 0–0.2)
BASOPHILS NFR BLD: 0.5 % (ref 0–1.9)
BILIRUB SERPL-MCNC: 0.5 MG/DL (ref 0.1–1)
BUN SERPL-MCNC: 15 MG/DL (ref 6–20)
CALCIUM SERPL-MCNC: 9.8 MG/DL (ref 8.7–10.5)
CHLORIDE SERPL-SCNC: 103 MMOL/L (ref 95–110)
CO2 SERPL-SCNC: 25 MMOL/L (ref 23–29)
CREAT SERPL-MCNC: 0.8 MG/DL (ref 0.5–1.4)
DIFFERENTIAL METHOD: ABNORMAL
EOSINOPHIL # BLD AUTO: 0.1 K/UL (ref 0–0.5)
EOSINOPHIL NFR BLD: 0.7 % (ref 0–8)
ERYTHROCYTE [DISTWIDTH] IN BLOOD BY AUTOMATED COUNT: 13 % (ref 11.5–14.5)
EST. GFR  (NO RACE VARIABLE): >60 ML/MIN/1.73 M^2
ESTRADIOL SERPL-MCNC: 17 PG/ML
FSH SERPL-ACNC: 121.73 MIU/ML
GLUCOSE SERPL-MCNC: 105 MG/DL (ref 70–110)
HCT VFR BLD AUTO: 37.2 % (ref 37–48.5)
HGB BLD-MCNC: 12.6 G/DL (ref 12–16)
IMM GRANULOCYTES # BLD AUTO: 0.02 K/UL (ref 0–0.04)
IMM GRANULOCYTES NFR BLD AUTO: 0.2 % (ref 0–0.5)
INR PPP: 1 (ref 0.8–1.2)
LYMPHOCYTES # BLD AUTO: 2.2 K/UL (ref 1–4.8)
LYMPHOCYTES NFR BLD: 26.4 % (ref 18–48)
MCH RBC QN AUTO: 31 PG (ref 27–31)
MCHC RBC AUTO-ENTMCNC: 33.9 G/DL (ref 32–36)
MCV RBC AUTO: 91 FL (ref 82–98)
MONOCYTES # BLD AUTO: 0.9 K/UL (ref 0.3–1)
MONOCYTES NFR BLD: 10.6 % (ref 4–15)
NEUTROPHILS # BLD AUTO: 5 K/UL (ref 1.8–7.7)
NEUTROPHILS NFR BLD: 61.6 % (ref 38–73)
NRBC BLD-RTO: 0 /100 WBC
PLATELET # BLD AUTO: 319 K/UL (ref 150–450)
PMV BLD AUTO: 8.8 FL (ref 9.2–12.9)
POTASSIUM SERPL-SCNC: 4.5 MMOL/L (ref 3.5–5.1)
PROT SERPL-MCNC: 7.6 G/DL (ref 6–8.4)
PROTHROMBIN TIME: 10.1 SEC (ref 9–12.5)
RBC # BLD AUTO: 4.07 M/UL (ref 4–5.4)
SODIUM SERPL-SCNC: 136 MMOL/L (ref 136–145)
WBC # BLD AUTO: 8.19 K/UL (ref 3.9–12.7)

## 2022-08-11 PROCEDURE — 85025 COMPLETE CBC W/AUTO DIFF WBC: CPT | Performed by: INTERNAL MEDICINE

## 2022-08-11 PROCEDURE — 99999 PR PBB SHADOW E&M-EST. PATIENT-LVL IV: ICD-10-PCS | Mod: PBBFAC,,, | Performed by: INTERNAL MEDICINE

## 2022-08-11 PROCEDURE — 99999 PR PBB SHADOW E&M-EST. PATIENT-LVL IV: CPT | Mod: PBBFAC,,, | Performed by: INTERNAL MEDICINE

## 2022-08-11 PROCEDURE — 1160F PR REVIEW ALL MEDS BY PRESCRIBER/CLIN PHARMACIST DOCUMENTED: ICD-10-PCS | Mod: CPTII,S$GLB,, | Performed by: INTERNAL MEDICINE

## 2022-08-11 PROCEDURE — 80053 COMPREHEN METABOLIC PANEL: CPT | Performed by: INTERNAL MEDICINE

## 2022-08-11 PROCEDURE — 1160F RVW MEDS BY RX/DR IN RCRD: CPT | Mod: CPTII,S$GLB,, | Performed by: INTERNAL MEDICINE

## 2022-08-11 PROCEDURE — 99214 OFFICE O/P EST MOD 30 MIN: CPT | Mod: S$GLB,,, | Performed by: INTERNAL MEDICINE

## 2022-08-11 PROCEDURE — 3079F DIAST BP 80-89 MM HG: CPT | Mod: CPTII,S$GLB,, | Performed by: INTERNAL MEDICINE

## 2022-08-11 PROCEDURE — 3079F PR MOST RECENT DIASTOLIC BLOOD PRESSURE 80-89 MM HG: ICD-10-PCS | Mod: CPTII,S$GLB,, | Performed by: INTERNAL MEDICINE

## 2022-08-11 PROCEDURE — 3008F PR BODY MASS INDEX (BMI) DOCUMENTED: ICD-10-PCS | Mod: CPTII,S$GLB,, | Performed by: INTERNAL MEDICINE

## 2022-08-11 PROCEDURE — 1159F PR MEDICATION LIST DOCUMENTED IN MEDICAL RECORD: ICD-10-PCS | Mod: CPTII,S$GLB,, | Performed by: INTERNAL MEDICINE

## 2022-08-11 PROCEDURE — 82670 ASSAY OF TOTAL ESTRADIOL: CPT | Performed by: INTERNAL MEDICINE

## 2022-08-11 PROCEDURE — 3075F PR MOST RECENT SYSTOLIC BLOOD PRESS GE 130-139MM HG: ICD-10-PCS | Mod: CPTII,S$GLB,, | Performed by: INTERNAL MEDICINE

## 2022-08-11 PROCEDURE — 85610 PROTHROMBIN TIME: CPT | Performed by: INTERNAL MEDICINE

## 2022-08-11 PROCEDURE — 83001 ASSAY OF GONADOTROPIN (FSH): CPT | Performed by: INTERNAL MEDICINE

## 2022-08-11 PROCEDURE — 1159F MED LIST DOCD IN RCRD: CPT | Mod: CPTII,S$GLB,, | Performed by: INTERNAL MEDICINE

## 2022-08-11 PROCEDURE — 3075F SYST BP GE 130 - 139MM HG: CPT | Mod: CPTII,S$GLB,, | Performed by: INTERNAL MEDICINE

## 2022-08-11 PROCEDURE — 99214 PR OFFICE/OUTPT VISIT, EST, LEVL IV, 30-39 MIN: ICD-10-PCS | Mod: S$GLB,,, | Performed by: INTERNAL MEDICINE

## 2022-08-11 PROCEDURE — 36415 COLL VENOUS BLD VENIPUNCTURE: CPT | Performed by: INTERNAL MEDICINE

## 2022-08-11 PROCEDURE — 3008F BODY MASS INDEX DOCD: CPT | Mod: CPTII,S$GLB,, | Performed by: INTERNAL MEDICINE

## 2022-08-11 NOTE — PROGRESS NOTES
Subjective:       Patient ID: Corinne Morrison Marcus is a 56 y.o. female.    Chief Complaint: Malignant neoplasm of right breast in female, estrogen rece    HPI     Returns for follow up  Needs verification that she is post menopausal    Healing well from surgery  We discussed Oncotype via phone previously and she will not need chemotherapy  AI to be initiated once menopausal status verified    History:  Patient is a  56-year-old female w/ hx of HLD, depression, who underwent bilateral breast reduction due to macromastia.  Incidentally patient was found to have right breast invasive carcinoma during resection. Subsequently underwent b/l mastectomy with reconstruction + R SLNB and R ALND on 6/17/22.      Prior screening mammograms w/o mass.   MRI breast without any obvious masses or lymph node involvement.     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  Gyn Hx: Menses 15 y/o, post-menopausal 54 y/o  FH:  had metastatic melanoma, Maternal grandmother - breast ca 71 y/o  Paternal aunt - breast ca 49 y/o        Review of Systems   Constitutional: Negative for activity change, appetite change, chills, fatigue, fever and unexpected weight change.   HENT: Negative for nasal congestion, dental problem, ear pain, hearing loss, mouth sores, nosebleeds, rhinorrhea, tinnitus and trouble swallowing.    Eyes: Negative for visual disturbance.   Respiratory: Negative for cough, chest tightness, shortness of breath and wheezing.    Cardiovascular: Negative for chest pain, palpitations and leg swelling.   Gastrointestinal: Negative for abdominal distention, abdominal pain, blood in stool, constipation, diarrhea, nausea and vomiting.   Genitourinary: Negative for decreased  urine volume, difficulty urinating, dysuria, frequency and hematuria.   Musculoskeletal: Negative for arthralgias, back pain, gait problem, joint swelling and neck pain.   Integumentary:  Negative for pallor and rash.   Neurological: Negative for dizziness, weakness, light-headedness, numbness and headaches.   Hematological: Negative for adenopathy. Does not bruise/bleed easily.   Psychiatric/Behavioral: Negative for confusion, dysphoric mood and sleep disturbance.         Objective:      Physical Exam  Vitals and nursing note reviewed.   Constitutional:       Comments: Presents alone  Very pleasant   Pulmonary:      Comments: Reconstruction sites healing well        Assessment:       Problem List Items Addressed This Visit     Breast cancer metastasized to axillary lymph node, right      Other Visit Diagnoses     Malignant neoplasm of right breast in female, estrogen receptor positive, unspecified site of breast    -  Primary    Relevant Orders    FOLLICLE STIMULATING HORMONE    ESTRADIOL (Completed)    CBC W/ AUTO DIFFERENTIAL (Completed)    Comprehensive Metabolic Panel (Completed)    PROTIME-INR (Completed)          Plan:       Check hormonal status and then initiate an aromatase inhibitor  Reviewed rationale, mechanism of action and potential side effects  Needs Dexa scan    Plans to get hysterectomy    Route Chart for Scheduling    Med Onc Chart Routing      Follow up with physician 6 months. Labs prior   Follow up with SHAUN 3 months. Labs prior, dexa prior   Infusion scheduling note    Injection scheduling note    Labs    Imaging    Pharmacy appointment    Other referrals

## 2022-08-12 ENCOUNTER — PATIENT MESSAGE (OUTPATIENT)
Dept: HEMATOLOGY/ONCOLOGY | Facility: CLINIC | Age: 57
End: 2022-08-12
Payer: COMMERCIAL

## 2022-08-15 DIAGNOSIS — C50.911 MALIGNANT NEOPLASM OF RIGHT BREAST IN FEMALE, ESTROGEN RECEPTOR POSITIVE, UNSPECIFIED SITE OF BREAST: Primary | ICD-10-CM

## 2022-08-15 DIAGNOSIS — Z17.0 MALIGNANT NEOPLASM OF RIGHT BREAST IN FEMALE, ESTROGEN RECEPTOR POSITIVE, UNSPECIFIED SITE OF BREAST: Primary | ICD-10-CM

## 2022-08-15 RX ORDER — LETROZOLE 2.5 MG/1
2.5 TABLET, FILM COATED ORAL DAILY
Qty: 30 TABLET | Refills: 2 | Status: SHIPPED | OUTPATIENT
Start: 2022-08-15 | End: 2022-08-18

## 2022-08-24 ENCOUNTER — PATIENT MESSAGE (OUTPATIENT)
Dept: SURGERY | Facility: CLINIC | Age: 57
End: 2022-08-24
Payer: COMMERCIAL

## 2022-09-15 ENCOUNTER — PATIENT MESSAGE (OUTPATIENT)
Dept: HEMATOLOGY/ONCOLOGY | Facility: CLINIC | Age: 57
End: 2022-09-15
Payer: COMMERCIAL

## 2022-09-15 ENCOUNTER — PATIENT MESSAGE (OUTPATIENT)
Dept: OBSTETRICS AND GYNECOLOGY | Facility: CLINIC | Age: 57
End: 2022-09-15
Payer: COMMERCIAL

## 2022-09-29 ENCOUNTER — PATIENT OUTREACH (OUTPATIENT)
Dept: ADMINISTRATIVE | Facility: HOSPITAL | Age: 57
End: 2022-09-29
Payer: COMMERCIAL

## 2022-09-29 ENCOUNTER — OFFICE VISIT (OUTPATIENT)
Dept: UROGYNECOLOGY | Facility: CLINIC | Age: 57
End: 2022-09-29
Payer: COMMERCIAL

## 2022-09-29 VITALS
HEIGHT: 64 IN | BODY MASS INDEX: 25.48 KG/M2 | SYSTOLIC BLOOD PRESSURE: 158 MMHG | DIASTOLIC BLOOD PRESSURE: 92 MMHG | WEIGHT: 149.25 LBS

## 2022-09-29 DIAGNOSIS — N39.46 URINARY INCONTINENCE, MIXED: Primary | ICD-10-CM

## 2022-09-29 PROCEDURE — 99215 PR OFFICE/OUTPT VISIT, EST, LEVL V, 40-54 MIN: ICD-10-PCS | Mod: 25,S$GLB,, | Performed by: OBSTETRICS & GYNECOLOGY

## 2022-09-29 PROCEDURE — 51701 PR INSERTION OF NON-INDWELLING BLADDER CATHETERIZATION FOR RESIDUAL UR: ICD-10-PCS | Mod: S$GLB,,, | Performed by: OBSTETRICS & GYNECOLOGY

## 2022-09-29 PROCEDURE — 87086 URINE CULTURE/COLONY COUNT: CPT | Performed by: OBSTETRICS & GYNECOLOGY

## 2022-09-29 PROCEDURE — 99215 OFFICE O/P EST HI 40 MIN: CPT | Mod: 25,S$GLB,, | Performed by: OBSTETRICS & GYNECOLOGY

## 2022-09-29 PROCEDURE — 3077F PR MOST RECENT SYSTOLIC BLOOD PRESSURE >= 140 MM HG: ICD-10-PCS | Mod: CPTII,S$GLB,, | Performed by: OBSTETRICS & GYNECOLOGY

## 2022-09-29 PROCEDURE — 1160F PR REVIEW ALL MEDS BY PRESCRIBER/CLIN PHARMACIST DOCUMENTED: ICD-10-PCS | Mod: CPTII,S$GLB,, | Performed by: OBSTETRICS & GYNECOLOGY

## 2022-09-29 PROCEDURE — 1159F PR MEDICATION LIST DOCUMENTED IN MEDICAL RECORD: ICD-10-PCS | Mod: CPTII,S$GLB,, | Performed by: OBSTETRICS & GYNECOLOGY

## 2022-09-29 PROCEDURE — 3080F DIAST BP >= 90 MM HG: CPT | Mod: CPTII,S$GLB,, | Performed by: OBSTETRICS & GYNECOLOGY

## 2022-09-29 PROCEDURE — 3008F PR BODY MASS INDEX (BMI) DOCUMENTED: ICD-10-PCS | Mod: CPTII,S$GLB,, | Performed by: OBSTETRICS & GYNECOLOGY

## 2022-09-29 PROCEDURE — 3077F SYST BP >= 140 MM HG: CPT | Mod: CPTII,S$GLB,, | Performed by: OBSTETRICS & GYNECOLOGY

## 2022-09-29 PROCEDURE — 51701 INSERT BLADDER CATHETER: CPT | Mod: S$GLB,,, | Performed by: OBSTETRICS & GYNECOLOGY

## 2022-09-29 PROCEDURE — 3008F BODY MASS INDEX DOCD: CPT | Mod: CPTII,S$GLB,, | Performed by: OBSTETRICS & GYNECOLOGY

## 2022-09-29 PROCEDURE — 99999 PR PBB SHADOW E&M-EST. PATIENT-LVL III: CPT | Mod: PBBFAC,,, | Performed by: OBSTETRICS & GYNECOLOGY

## 2022-09-29 PROCEDURE — 1160F RVW MEDS BY RX/DR IN RCRD: CPT | Mod: CPTII,S$GLB,, | Performed by: OBSTETRICS & GYNECOLOGY

## 2022-09-29 PROCEDURE — 1159F MED LIST DOCD IN RCRD: CPT | Mod: CPTII,S$GLB,, | Performed by: OBSTETRICS & GYNECOLOGY

## 2022-09-29 PROCEDURE — 3080F PR MOST RECENT DIASTOLIC BLOOD PRESSURE >= 90 MM HG: ICD-10-PCS | Mod: CPTII,S$GLB,, | Performed by: OBSTETRICS & GYNECOLOGY

## 2022-09-29 PROCEDURE — 99999 PR PBB SHADOW E&M-EST. PATIENT-LVL III: ICD-10-PCS | Mod: PBBFAC,,, | Performed by: OBSTETRICS & GYNECOLOGY

## 2022-09-29 NOTE — PROGRESS NOTES
[facility] - UROGYNECOLOGY  [address] [place] [id number]    Corinne Morrison Marcus  [id number]  1965  September 29, 2022    Consulting Physician: Keshia Marino, *   GYN: MD Ned  Primary M.D.: Gene Sterling MD    Chief Complaint   Patient presents with    Urinary Incontinence     HPI:     1)  UI:  + YELITZA (tennis, cough, sneeze) = (+) UUI (standing/spontaneous, walking)  X 14years.  (+) pads:2/day, usually severe wetness (should use more/day) and 0/night, does not endorse usual pads at night.  Daytime frequency: Q 1 hours.  Nocturia: No: 1/night.   (--) dysuria,  (--) hematuria,  (--) frequent UTIs, used to have frequent UTIs 8-10 yrs ago. + complete bladder emptying.   --was Rx'd oxybutynin but never tried  --has tried Kegels--didn't help    2)  POP:  Absent.  (--) vaginal bleeding. (--) vaginal discharge. (--) sexually active.  (--) dyspareunia. (--)  Vaginal dryness.  (--) vaginal estrogen use.     3)  BM:  (--) constipation/straining. Take 4 senekot a night.  (--) chronic diarrhea. (--) hematochezia.  (--) fecal incontinence.  (--) fecal smearing/urgency.  (--) complete evacuation.   --sees Dr. Solorzano     4)  Desires hysterectomy:  --for risk reduction with h/o breast CA  8/2022 , estradiol 17    Past Medical History  Past Medical History:   Diagnosis Date    Abnormal Pap smear of cervix     Abnormal Pap smear of vagina     Anxiety     Breast cancer 06/08/2022    Cancer     Constipation     Depression     Hemorrhoids     HLD (hyperlipidemia)     Lung nodule     Multinodular goiter 2010    s/p bx of 2 nodules in 2011 - benign   --Breast CA: Dx 6/2022. S/p B mastectomy + flap. No post chemo/radTx. Taking Femara.       Past Surgical History  Past Surgical History:   Procedure Laterality Date    APPENDECTOMY      BILATERAL MASTECTOMY Bilateral 06/17/2022    BREAST RECONSTRUCTION Bilateral 08/29/2022    COLONOSCOPY      4 as of 2015 Valerie Holt, next due in 2025     COLONOSCOPY N/A 10/27/2021    Procedure: COLONOSCOPY;  Surgeon: Garland Whalen MD;  Location: [facility] (4TH FLR);  Service: Colon and Rectal;  Laterality: N/A;  Covid test 10/24 [place], instructions sent to [facility]   10/25 arrival time confirmed with pt-rb    COLPOSCOPY  2014    EXCISIONAL HEMORRHOIDECTOMY      TOTAL REDUCTION MAMMOPLASTY      TOTAL REDUCTION MAMMOPLASTY Bilateral 2022    Procedure: MAMMOPLASTY, REDUCTION;  Surgeon: Dex Gutierrez MD;  Location: [facility];  Service: Plastics;  Laterality: Bilateral;   RLQ appy     Hysterectomy: No    Past Ob History     x 5.  C/s x 0.    Largest infant weight: 9 lbs  no FAVD. yes episiotomy one time, probably number.      Gynecologic History  LMP: Patient's last menstrual period was 2019.  Age of menarche: 14  Age of menopause: 55  Menstrual history: regular, heavier after the kids  Pap test: , normal/HPV.  History of abnormal paps: Yes - some in the past, vaginal ultrasounds in the past, never significant findings.  History of STIs:  No  Mammogram: Date of last: 2021.  Result: Normal. Has a history of a recent breast cancer diagnosis.  Colonoscopy: Date of last: .  Result: diverticulosis, polyp. Repeat due:  .   DEXA:  scheduled    Family History  Family History   Problem Relation Age of Onset    Breast cancer Maternal Grandmother 68    Hypertension Mother     Heart attack Mother     Lung cancer Father         lung, + tobacco    No Known Problems Brother     No Known Problems Daughter     Other Son         lymphedema    No Known Problems Son     No Known Problems Son     No Known Problems Daughter     Ovarian cancer Neg Hx     Colon cancer Neg Hx     Cancer Neg Hx       Colon CA: No  Breast CA: Yes - M[place]. [place]  GYN CA: No   CA: No    Social History  Social History     Tobacco Use   Smoking Status Never   Smokeless Tobacco Never     Social History     Substance and Sexual Activity   Alcohol Use Yes     "Comment: socially   .    Social History     Substance and Sexual Activity   Drug Use No   .  The patient is .  Resides in [place] [address].  Employment status: homemaker.     Allergies  Review of patient's allergies indicates:  No Known Allergies    Medications  Current Outpatient Medications on File Prior to Visit   Medication Sig Dispense Refill    atorvastatin (LIPITOR) 40 MG tablet Take 1 tablet (40 mg total) by mouth once daily. (Patient taking differently: Take 40 mg by mouth every morning.) 90 tablet 3    cetirizine (ZYRTEC) 5 MG tablet Take 5 mg by mouth once daily.      fluticasone (FLONASE) 50 mcg/actuation nasal spray 1 spray by Each Nare route once daily.      letrozole (FEMARA) 2.5 mg Tab Take 1 tablet (2.5 mg total) by mouth once daily. 30 tablet 2    ondansetron (ZOFRAN-ODT) 8 MG TbDL Take 1 tablet (8 mg total) by mouth every 6 (six) hours as needed (nausea). 15 tablet 1    sertraline (ZOLOFT) 100 MG tablet Take 1 tablet (100 mg total) by mouth once daily. (Patient taking differently: Take 100 mg by mouth every evening.) 90 tablet 3    spironolactone (ALDACTONE) 100 MG tablet Take 100 mg by mouth every morning.       No current facility-administered medications on file prior to visit.       Review of Systems A 14 point ROS was reviewed with pertinent positives as noted above in the history of present illness.      Constitutional: negative  Eyes: negative  Endocrine: negative  Gastrointestinal: negative  Cardiovascular: negative  Respiratory: negative  Allergic/Immunologic: negative  Integumentary: negative  Psychiatric: negative  Musculoskeletal: negative   Ear/Nose/Throat: negative  Neurologic: negative  Genitourinary: SEE HPI  Hematologic/Lymphatic: negative   Breast: negative    Urogynecologic Exam  BP (!) 158/92 (BP Location: Left arm, Patient Position: Sitting, BP Method: Medium (Automatic))   Ht 5' 4" (1.626 m)   Wt 67.7 kg (149 lb 4 oz)   LMP 03/18/2019   BMI 25.62 kg/m² "     GENERAL APPEARANCE:  The patient is well-developed, well-nourished.  Neck:  Supple with no thyromegaly, no carotid bruits.  Heart:  Regular rate and rhythm, no murmurs, rubs or gallops.  Lungs:  Clear.  No CVA tenderness.  Abdomen:  Soft, nontender, nondistended, no hepatosplenomegaly.  Incisions:  Low transverse flap well-healed    PELVIC:    External genitalia:  Normal Bartholins, Skenes and labia bilaterally.    Urethra:  No caruncle, diverticulum or masses.  (+) hypermobility.    Vagina:  Atrophy (+) mild, no bladder masses or tender, no discharge.    Cervix:  normal appearance  Uterus: normal size, contour, position, consistency, mobility, non-tender  Adnexa: Not palpable.    POP-Q:  Aa -1; Ba -1; C -5; Ap -1; Bp -1; D -7.  Genital hiatus 3, perineal body 2, total vaginal length 10-11.    NEUROLOGIC:  Cranial nerves 2 through 12 intact.  Strength 5/5.  DTRs 2+ lower extremities.  S2 through 4 normal.  Sacral reflexes intact.    EXT: RODRIGEZ, 2+ pulses bilaterally, no C/C/E    COUGH STRESS TEST:  negative  KEGEL: 1 /5    RECTAL:    External:  Normal, (--) hemorrhoids, (--) dovetailing.   Internal:   deferred    PVR: 30 mL    Impression    No diagnosis found.    Initial Plan  The patient was counseled regarding these issues. The patient was given a summary sheet containing each of these issues with possible options for evaluation and management. When appropriate, we also reviewed computer-generated diagrams specific to their diagnoses..  All questions were addressed to the patient's satisfaction.    1)  Mixed urinary incontinence, urge = stress:    --urine C&S  --Empty bladder every 3 hours.  Empty well: wait a minute, lean forward on toilet.    --Avoid dietary irritants (see sheet).  Keep diary x 3-5 days to determine your irritants.  --KEGELS: do 10 in AM and 10 in PM, holding each x 10 seconds.  When you feel urge to go, STOP, KEGEL, and when urge has passed, then go to bathroom.  Consider PT in future.     --URGE: consider medication in future. Takes 2-4 weeks to see if will have effect.  For dry mouth: get sour, sugar free lozenge or gum.    --STRESS:  Pessary vs. Periurethral injections vs. Sling.    --needs UDS/cysto preop    2)  H/o E2 sensitive breast cancer, on femara:  --desires hysterectomy + salpingo-oophorectomy for risk reduction   --if surgery: TLH/BSO  --stage 1-2 cystocele/rectocele: currently asymptomatic but add USS of needed   --if surgery: need UDS/cysto to see if need concomitant sling   --if surgery: need pelvic US   --if surgery:  need clearance per PCP (Hallie) + CMP/CBC/T&S, EKG   --she would like surgery 2022 if possible as has met deductible  --Discussed R/B of this, adalberto since she would like oophorectomy.  We discussed fact that oophorectomy may be associated with increase in bothersome menopausal symptoms and potentially negatively impact on bone density and cardiovascular health. She is getting a DEXA scan in near future and willing to medically treat if needed.  She denies current menopausal symptoms and understands that, if these do increase, there are hormonal (not ideal candidate with h/o E2 sens breast CA) and non-hormonal treatments.  Will message Jessie Marino and Freddie to make sure no other contraindications to procedure from their perspective.     3)  SUDS/cysto scheduled 11/3. Will call you to discuss OR dates/set up preop appts.     Approximately 45 min were spent in consult, 90 % in discussion.     Thank you for requesting consultation of your patient.  I look forward to participating in their care.    Sheree Holden  Female Pelvic Medicine and Reconstructive Surgery  [facility] [place], [place]

## 2022-09-30 ENCOUNTER — TELEPHONE (OUTPATIENT)
Dept: UROGYNECOLOGY | Facility: CLINIC | Age: 57
End: 2022-09-30
Payer: COMMERCIAL

## 2022-09-30 DIAGNOSIS — N39.46 URINARY INCONTINENCE, MIXED: Primary | ICD-10-CM

## 2022-09-30 DIAGNOSIS — N81.4 VAGINAL AND CERVICAL PROLAPSE: ICD-10-CM

## 2022-10-02 LAB — BACTERIA UR CULT: NO GROWTH

## 2022-10-03 ENCOUNTER — PATIENT MESSAGE (OUTPATIENT)
Dept: UROGYNECOLOGY | Facility: CLINIC | Age: 57
End: 2022-10-03
Payer: COMMERCIAL

## 2022-10-03 ENCOUNTER — PATIENT MESSAGE (OUTPATIENT)
Dept: HEMATOLOGY/ONCOLOGY | Facility: CLINIC | Age: 57
End: 2022-10-03
Payer: COMMERCIAL

## 2022-10-03 ENCOUNTER — TELEPHONE (OUTPATIENT)
Dept: HEMATOLOGY/ONCOLOGY | Facility: CLINIC | Age: 57
End: 2022-10-03
Payer: COMMERCIAL

## 2022-10-04 DIAGNOSIS — C77.3 BREAST CANCER METASTASIZED TO AXILLARY LYMPH NODE, RIGHT: ICD-10-CM

## 2022-10-04 DIAGNOSIS — E04.2 MULTINODULAR THYROID: ICD-10-CM

## 2022-10-04 DIAGNOSIS — N39.46 URINARY INCONTINENCE, MIXED: ICD-10-CM

## 2022-10-04 DIAGNOSIS — N81.4 VAGINAL AND CERVICAL PROLAPSE: Primary | ICD-10-CM

## 2022-10-04 DIAGNOSIS — E78.00 HYPERCHOLESTEROLEMIA: ICD-10-CM

## 2022-10-04 DIAGNOSIS — C50.911 BREAST CANCER METASTASIZED TO AXILLARY LYMPH NODE, RIGHT: ICD-10-CM

## 2022-10-04 DIAGNOSIS — N39.3 SUI (STRESS URINARY INCONTINENCE, FEMALE): ICD-10-CM

## 2022-10-18 ENCOUNTER — HOSPITAL ENCOUNTER (OUTPATIENT)
Dept: RADIOLOGY | Facility: OTHER | Age: 57
Discharge: HOME | End: 2022-10-18
Attending: PHYSICIAN ASSISTANT
Payer: COMMERCIAL

## 2022-10-18 DIAGNOSIS — N39.46 URINARY INCONTINENCE, MIXED: ICD-10-CM

## 2022-10-18 DIAGNOSIS — N81.4 VAGINAL AND CERVICAL PROLAPSE: ICD-10-CM

## 2022-10-18 PROCEDURE — 76856 US PELVIS COMP WITH TRANSVAG NON-OB (XPD): ICD-10-PCS | Mod: 26,,, | Performed by: RADIOLOGY

## 2022-10-18 PROCEDURE — 76830 US PELVIS COMP WITH TRANSVAG NON-OB (XPD): ICD-10-PCS | Mod: 26,,, | Performed by: RADIOLOGY

## 2022-10-18 PROCEDURE — 76830 TRANSVAGINAL US NON-OB: CPT | Mod: TC

## 2022-10-18 PROCEDURE — 76856 US EXAM PELVIC COMPLETE: CPT | Mod: 26,,, | Performed by: RADIOLOGY

## 2022-10-18 PROCEDURE — 76830 TRANSVAGINAL US NON-OB: CPT | Mod: 26,,, | Performed by: RADIOLOGY

## 2022-10-31 ENCOUNTER — LAB VISIT (OUTPATIENT)
Dept: LAB | Facility: HOSPITAL | Age: 57
End: 2022-10-31
Attending: INTERNAL MEDICINE
Payer: COMMERCIAL

## 2022-10-31 DIAGNOSIS — Z17.0 MALIGNANT NEOPLASM OF RIGHT BREAST IN FEMALE, ESTROGEN RECEPTOR POSITIVE, UNSPECIFIED SITE OF BREAST: ICD-10-CM

## 2022-10-31 DIAGNOSIS — C50.911 MALIGNANT NEOPLASM OF RIGHT BREAST IN FEMALE, ESTROGEN RECEPTOR POSITIVE, UNSPECIFIED SITE OF BREAST: ICD-10-CM

## 2022-10-31 LAB
ALBUMIN SERPL BCP-MCNC: 3.8 G/DL (ref 3.5–5.2)
ALP SERPL-CCNC: 74 U/L (ref 55–135)
ALT SERPL W/O P-5'-P-CCNC: 14 U/L (ref 10–44)
ANION GAP SERPL CALC-SCNC: 9 MMOL/L (ref 8–16)
AST SERPL-CCNC: 13 U/L (ref 10–40)
BASOPHILS # BLD AUTO: 0.03 K/UL (ref 0–0.2)
BASOPHILS NFR BLD: 0.4 % (ref 0–1.9)
BILIRUB SERPL-MCNC: 0.5 MG/DL (ref 0.1–1)
BUN SERPL-MCNC: 14 MG/DL (ref 6–20)
CALCIUM SERPL-MCNC: 9.7 MG/DL (ref 8.7–10.5)
CHLORIDE SERPL-SCNC: 106 MMOL/L (ref 95–110)
CO2 SERPL-SCNC: 24 MMOL/L (ref 23–29)
CREAT SERPL-MCNC: 0.8 MG/DL (ref 0.5–1.4)
DIFFERENTIAL METHOD: ABNORMAL
EOSINOPHIL # BLD AUTO: 0.2 K/UL (ref 0–0.5)
EOSINOPHIL NFR BLD: 1.9 % (ref 0–8)
ERYTHROCYTE [DISTWIDTH] IN BLOOD BY AUTOMATED COUNT: 13.2 % (ref 11.5–14.5)
EST. GFR  (NO RACE VARIABLE): >60 ML/MIN/1.73 M^2
GLUCOSE SERPL-MCNC: 100 MG/DL (ref 70–110)
HCT VFR BLD AUTO: 39.6 % (ref 37–48.5)
HGB BLD-MCNC: 13 G/DL (ref 12–16)
IMM GRANULOCYTES # BLD AUTO: 0.04 K/UL (ref 0–0.04)
IMM GRANULOCYTES NFR BLD AUTO: 0.5 % (ref 0–0.5)
LYMPHOCYTES # BLD AUTO: 1.9 K/UL (ref 1–4.8)
LYMPHOCYTES NFR BLD: 23.4 % (ref 18–48)
MCH RBC QN AUTO: 29 PG (ref 27–31)
MCHC RBC AUTO-ENTMCNC: 32.8 G/DL (ref 32–36)
MCV RBC AUTO: 88 FL (ref 82–98)
MONOCYTES # BLD AUTO: 0.9 K/UL (ref 0.3–1)
MONOCYTES NFR BLD: 11 % (ref 4–15)
NEUTROPHILS # BLD AUTO: 5.1 K/UL (ref 1.8–7.7)
NEUTROPHILS NFR BLD: 62.8 % (ref 38–73)
NRBC BLD-RTO: 0 /100 WBC
PLATELET # BLD AUTO: 340 K/UL (ref 150–450)
PMV BLD AUTO: 9 FL (ref 9.2–12.9)
POTASSIUM SERPL-SCNC: 4.9 MMOL/L (ref 3.5–5.1)
PROT SERPL-MCNC: 7.1 G/DL (ref 6–8.4)
RBC # BLD AUTO: 4.49 M/UL (ref 4–5.4)
SODIUM SERPL-SCNC: 139 MMOL/L (ref 136–145)
WBC # BLD AUTO: 8.07 K/UL (ref 3.9–12.7)

## 2022-10-31 PROCEDURE — 85025 COMPLETE CBC W/AUTO DIFF WBC: CPT | Performed by: INTERNAL MEDICINE

## 2022-10-31 PROCEDURE — 36415 COLL VENOUS BLD VENIPUNCTURE: CPT | Performed by: INTERNAL MEDICINE

## 2022-10-31 PROCEDURE — 80053 COMPREHEN METABOLIC PANEL: CPT | Performed by: INTERNAL MEDICINE

## 2022-11-01 ENCOUNTER — HOSPITAL ENCOUNTER (OUTPATIENT)
Dept: CARDIOLOGY | Facility: OTHER | Age: 57
Discharge: HOME | End: 2022-11-01
Attending: STUDENT IN AN ORGANIZED HEALTH CARE EDUCATION/TRAINING PROGRAM
Payer: COMMERCIAL

## 2022-11-01 ENCOUNTER — OFFICE VISIT (OUTPATIENT)
Dept: INTERNAL MEDICINE | Facility: CLINIC | Age: 57
End: 2022-11-01
Payer: COMMERCIAL

## 2022-11-01 VITALS
HEIGHT: 64 IN | SYSTOLIC BLOOD PRESSURE: 142 MMHG | WEIGHT: 146.38 LBS | HEART RATE: 79 BPM | OXYGEN SATURATION: 99 % | BODY MASS INDEX: 24.99 KG/M2 | DIASTOLIC BLOOD PRESSURE: 104 MMHG

## 2022-11-01 DIAGNOSIS — C50.911 BREAST CANCER METASTASIZED TO AXILLARY LYMPH NODE, RIGHT: ICD-10-CM

## 2022-11-01 DIAGNOSIS — R03.0 ELEVATED BLOOD PRESSURE READING IN OFFICE WITHOUT DIAGNOSIS OF HYPERTENSION: ICD-10-CM

## 2022-11-01 DIAGNOSIS — Z01.818 PRE-OP EVALUATION: Primary | ICD-10-CM

## 2022-11-01 DIAGNOSIS — Z01.818 PRE-OP EVALUATION: ICD-10-CM

## 2022-11-01 DIAGNOSIS — G47.00 INSOMNIA, UNSPECIFIED TYPE: ICD-10-CM

## 2022-11-01 DIAGNOSIS — F32.1 CURRENT MODERATE EPISODE OF MAJOR DEPRESSIVE DISORDER WITHOUT PRIOR EPISODE: ICD-10-CM

## 2022-11-01 DIAGNOSIS — C77.3 BREAST CANCER METASTASIZED TO AXILLARY LYMPH NODE, RIGHT: ICD-10-CM

## 2022-11-01 DIAGNOSIS — F41.9 ANXIETY AND DEPRESSION: ICD-10-CM

## 2022-11-01 DIAGNOSIS — E04.2 MULTINODULAR THYROID: ICD-10-CM

## 2022-11-01 DIAGNOSIS — F32.A ANXIETY AND DEPRESSION: ICD-10-CM

## 2022-11-01 DIAGNOSIS — F43.21 GRIEF REACTION: ICD-10-CM

## 2022-11-01 PROCEDURE — 99215 OFFICE O/P EST HI 40 MIN: CPT | Mod: S$GLB,,, | Performed by: STUDENT IN AN ORGANIZED HEALTH CARE EDUCATION/TRAINING PROGRAM

## 2022-11-01 PROCEDURE — 93010 EKG 12-LEAD: ICD-10-PCS | Mod: ,,, | Performed by: INTERNAL MEDICINE

## 2022-11-01 PROCEDURE — 3077F PR MOST RECENT SYSTOLIC BLOOD PRESSURE >= 140 MM HG: ICD-10-PCS | Mod: CPTII,S$GLB,, | Performed by: STUDENT IN AN ORGANIZED HEALTH CARE EDUCATION/TRAINING PROGRAM

## 2022-11-01 PROCEDURE — 3008F PR BODY MASS INDEX (BMI) DOCUMENTED: ICD-10-PCS | Mod: CPTII,S$GLB,, | Performed by: STUDENT IN AN ORGANIZED HEALTH CARE EDUCATION/TRAINING PROGRAM

## 2022-11-01 PROCEDURE — 99999 PR PBB SHADOW E&M-EST. PATIENT-LVL III: CPT | Mod: PBBFAC,,, | Performed by: STUDENT IN AN ORGANIZED HEALTH CARE EDUCATION/TRAINING PROGRAM

## 2022-11-01 PROCEDURE — 99999 PR PBB SHADOW E&M-EST. PATIENT-LVL III: ICD-10-PCS | Mod: PBBFAC,,, | Performed by: STUDENT IN AN ORGANIZED HEALTH CARE EDUCATION/TRAINING PROGRAM

## 2022-11-01 PROCEDURE — 99215 PR OFFICE/OUTPT VISIT, EST, LEVL V, 40-54 MIN: ICD-10-PCS | Mod: S$GLB,,, | Performed by: STUDENT IN AN ORGANIZED HEALTH CARE EDUCATION/TRAINING PROGRAM

## 2022-11-01 PROCEDURE — 3077F SYST BP >= 140 MM HG: CPT | Mod: CPTII,S$GLB,, | Performed by: STUDENT IN AN ORGANIZED HEALTH CARE EDUCATION/TRAINING PROGRAM

## 2022-11-01 PROCEDURE — 1159F MED LIST DOCD IN RCRD: CPT | Mod: CPTII,S$GLB,, | Performed by: STUDENT IN AN ORGANIZED HEALTH CARE EDUCATION/TRAINING PROGRAM

## 2022-11-01 PROCEDURE — 1159F PR MEDICATION LIST DOCUMENTED IN MEDICAL RECORD: ICD-10-PCS | Mod: CPTII,S$GLB,, | Performed by: STUDENT IN AN ORGANIZED HEALTH CARE EDUCATION/TRAINING PROGRAM

## 2022-11-01 PROCEDURE — 3008F BODY MASS INDEX DOCD: CPT | Mod: CPTII,S$GLB,, | Performed by: STUDENT IN AN ORGANIZED HEALTH CARE EDUCATION/TRAINING PROGRAM

## 2022-11-01 PROCEDURE — 93005 ELECTROCARDIOGRAM TRACING: CPT

## 2022-11-01 PROCEDURE — 3080F DIAST BP >= 90 MM HG: CPT | Mod: CPTII,S$GLB,, | Performed by: STUDENT IN AN ORGANIZED HEALTH CARE EDUCATION/TRAINING PROGRAM

## 2022-11-01 PROCEDURE — 93010 ELECTROCARDIOGRAM REPORT: CPT | Mod: ,,, | Performed by: INTERNAL MEDICINE

## 2022-11-01 PROCEDURE — 3080F PR MOST RECENT DIASTOLIC BLOOD PRESSURE >= 90 MM HG: ICD-10-PCS | Mod: CPTII,S$GLB,, | Performed by: STUDENT IN AN ORGANIZED HEALTH CARE EDUCATION/TRAINING PROGRAM

## 2022-11-01 RX ORDER — TRAZODONE HYDROCHLORIDE 50 MG/1
50 TABLET ORAL NIGHTLY
Qty: 30 TABLET | Refills: 3 | Status: SHIPPED | OUTPATIENT
Start: 2022-11-01 | End: 2023-01-09

## 2022-11-01 NOTE — Clinical Note
Please help schedule repeat US thyroid, no particular hurry.  Recommend prior to surgery 11/21 because recovery will delay some things for a while

## 2022-11-01 NOTE — PROGRESS NOTES
[facility]    Subjective:       Patient ID: Corinne Morrison Marcus is a 57 y.o. female.    Chief Complaint: Pre-op Exam      History was obtained from the patient and supplemented through chart review.  This patient is known to me.     HPI:    Pt is a 57 y.o. female who presents for a pre-operative clearance prior to surgery.  Pt is followed by Dr. Holden and will have laparoscopic hysterectomy, BSO, uterosacral ligament suspension, possible anterior/posterior colporrhaphy, possible mid-urethral sling placement, and cystoscopy on 11/21/2022. She denies any personal or family history of chronic lung disease, bleeding disorders, hypercoaguable disorders, blood clots, pulmonary embolisms, difficulty with anesthesia or sudden death.  Patient is able to walk up a flight of stairs without difficulty breathing, functional capacity > 4 mets.  Denies orthopnea, pnd, CP , SOB.    Pt has no history of DM and is a non-smoker .     Elevated BP in office, elevated at home same day  Normal 124/84 with dr. Holden  Will monitor for now    Recent Right Breast invasive cancer during resection  S/p bilat mastectomy with reconstruction  Stage 1B IDC of right Breast, ER+, ID+, Node +,  HER2-  Dr. Frazier with heme onc following  Taking letrozole for chemoprevention  Stable    Grief reaction  Adjustment reaction with anxiety and depression  Was seeing Rekha Ariza for grief counseling  Daughters out of the house (13 year old at home), also a 23 year old, doing well  Taking zoloft 100 mg, stable.  Some hard moments, but generally well  R/B discussed    Thyroid nodule  Did not meet FNA threshold 2021, overdue for f/u  Needs repeat us for surveillance    Lung nodule  5mm stable 2017 -> 2019  No further imaging needed    Perimenopausal symptoms  Had been on compounded hormones in the past    HLD  Started statin, repeat to be scheduled    Two daughters 18/20 who see me as PCP as well  Two sons   passed after metastatic  melanoma  Mother patient      Medical History  Past Medical History:   Diagnosis Date    Abnormal Pap smear of cervix     Abnormal Pap smear of vagina     Anxiety     Breast cancer 06/08/2022    Cancer     Constipation     Depression     Hemorrhoids     HLD (hyperlipidemia)     Lung nodule     Multinodular goiter 2010    s/p bx of 2 nodules in 2011 - benign       Review of Systems   Constitutional:  Negative for fever.   HENT:  Negative for trouble swallowing.    Respiratory:  Negative for shortness of breath.    Cardiovascular:  Negative for chest pain.   Gastrointestinal:  Negative for constipation, diarrhea, nausea and vomiting.   Genitourinary:         Urinary incontinence   Musculoskeletal:  Negative for gait problem.   Neurological:  Negative for dizziness and seizures.   Psychiatric/Behavioral:  Positive for dysphoric mood (comes and goes, generally well). Negative for hallucinations.        Surgical hx, family hx, social hx   Have been reviewed      Current Outpatient Medications:     atorvastatin (LIPITOR) 40 MG tablet, Take 1 tablet (40 mg total) by mouth once daily. (Patient taking differently: Take 40 mg by mouth every morning.), Disp: 90 tablet, Rfl: 3    cetirizine (ZYRTEC) 5 MG tablet, Take 5 mg by mouth once daily., Disp: , Rfl:     fluticasone (FLONASE) 50 mcg/actuation nasal spray, 1 spray by Each Nare route once daily., Disp: , Rfl:     letrozole (FEMARA) 2.5 mg Tab, Take 1 tablet (2.5 mg total) by mouth once daily., Disp: 30 tablet, Rfl: 2    ondansetron (ZOFRAN-ODT) 8 MG TbDL, Take 1 tablet (8 mg total) by mouth every 6 (six) hours as needed (nausea). (Patient not taking: Reported on 11/3/2022), Disp: 15 tablet, Rfl: 1    sertraline (ZOLOFT) 100 MG tablet, Take 1 tablet (100 mg total) by mouth once daily. (Patient taking differently: Take 100 mg by mouth every evening.), Disp: 90 tablet, Rfl: 3    spironolactone (ALDACTONE) 100 MG tablet, Take 100 mg by mouth every morning., Disp: , Rfl:      "methen-m.blue-s.phos-phsal-hyo (URIBEL) 118-10-40.8-36 mg Cap, Take 1 capsule by mouth 4 (four) times daily as needed (bladder irritability)., Disp: 30 capsule, Rfl: 11    traZODone (DESYREL) 50 MG tablet, Take 1 tablet (50 mg total) by mouth every evening. (Patient not taking: Reported on 11/3/2022), Disp: 30 tablet, Rfl: 3  No current facility-administered medications for this visit.    Objective:        Body mass index is 25.13 kg/m².  Vitals:    11/01/22 0816   BP: (!) 142/104   Pulse: 79   SpO2: 99%   Weight: 66.4 kg (146 lb 6.2 oz)   Height: 5' 4" (1.626 m)   PainSc: 0-No pain     Physical Exam  Vitals and nursing note reviewed.   Constitutional:       General: She is not in acute distress.     Appearance: Normal appearance. She is not ill-appearing.   HENT:      Head: Normocephalic and atraumatic.   Eyes:      General: No scleral icterus.  Cardiovascular:      Rate and Rhythm: Normal rate and regular rhythm.      Heart sounds: Normal heart sounds.   Pulmonary:      Effort: Pulmonary effort is normal.   Musculoskeletal:         General: No deformity.      Cervical back: Normal range of motion.   Skin:     General: Skin is warm and dry.   Neurological:      Mental Status: She is alert and oriented to person, place, and time.   Psychiatric:         Behavior: Behavior normal.         Lab Results   Component Value Date    WBC 8.07 10/31/2022    HGB 13.0 10/31/2022    HCT 39.6 10/31/2022     10/31/2022    CHOL 230 (H) 10/29/2021    TRIG 68 10/29/2021    HDL 69 10/29/2021    ALT 14 10/31/2022    AST 13 10/31/2022     10/31/2022    K 4.9 10/31/2022     10/31/2022    CREATININE 0.8 10/31/2022    BUN 14 10/31/2022    CO2 24 10/31/2022    TSH 0.589 10/29/2021    INR 1.0 08/11/2022    HGBA1C 5.5 10/29/2021       The 10-year ASCVD risk score (Gavino BERNAL, et al., 2019) is: 2.2%    Values used to calculate the score:      Age: 57 years      Sex: Female      Is Non- : No      " Diabetic: No      Tobacco smoker: No      Systolic Blood Pressure: 124 mmHg      Is BP treated: No      HDL Cholesterol: 69 mg/dL      Total Cholesterol: 230 mg/dL    (Imaging have been independently reviewed)  Reviewed MRI breast 6/2022    Assessment:         1. Pre-op evaluation    2. Elevated blood pressure reading in office without diagnosis of hypertension    3. Insomnia, unspecified type    4. Anxiety and depression    5. Grief reaction    6. Multinodular thyroid    7. Current moderate episode of major depressive disorder without prior episode    8. Breast cancer metastasized to axillary lymph node, right          Plan:     Corinne was seen today for pre-op exam.    Diagnoses and all orders for this visit:    Pre-op evaluation  -     SCHEDULED EKG 12-LEAD (to Muse); Future    Elevated blood pressure reading in office without diagnosis of hypertension  Comments:  Asked pt for repeat BP at home, pt actively crying in office. Review of past Bps show abrupt increase after dx of breast cancer  Encouraed & f/u BP before surge    Insomnia, unspecified type  -     traZODone (DESYREL) 50 MG tablet; Take 1 tablet (50 mg total) by mouth every evening. (Patient not taking: Reported on 11/3/2022)    Anxiety and depression    Grief reaction    Multinodular thyroid  -     US Soft Tissue Head Neck Thyroid; Future    Current moderate episode of major depressive disorder without prior episode    Breast cancer metastasized to axillary lymph node, right    Will be following up BP in coming days    Revised Perez Cardiac Risk Index - 3.9% risk for serious cardiac event 2/2 planned surgery based on the following parameters                        - intermediate risk surgery                        - no history of ischemic heart disease                        - no hx of heart failure                        - no history of CVA                        - no history of DM treated with insulin                        - preoperative Scr  <2.0        No further testing required prior to surgery in this patient who is at 3.9% risk for cardiac complications resulting from this intermediate risk procedure    Follow up for annual or sooner if needed. In jan beginning new year      40 min were used in chart review, evaluation and counseling of patient re: breast cancer, plans for surgery, family grief, documentation and review of results on same day of service     All medications were reviewed including potential side effects and risks/benefits.  Pt was counseled to call back if anything worsens or if questions arise.    Gene Sterling MD  Family Medicine  [facility]  [employer]  [address] [place] [id number]  Phone [phone]  Fax [phone]

## 2022-11-02 ENCOUNTER — PATIENT MESSAGE (OUTPATIENT)
Dept: INTERNAL MEDICINE | Facility: CLINIC | Age: 57
End: 2022-11-02
Payer: COMMERCIAL

## 2022-11-02 ENCOUNTER — TELEPHONE (OUTPATIENT)
Dept: INTERNAL MEDICINE | Facility: CLINIC | Age: 57
End: 2022-11-02
Payer: COMMERCIAL

## 2022-11-02 NOTE — TELEPHONE ENCOUNTER
----- Message from Gene Sterling MD sent at 11/1/2022 10:12 AM CDT -----  Please help schedule repeat US thyroid, no particular hurry.  Recommend prior to surgery 11/21 because recovery will delay some things for a while

## 2022-11-02 NOTE — TELEPHONE ENCOUNTER
Attempted to contact Ms. Rogelio to inform her per Dr. Sterling that Please help schedule repeat US thyroid, no particular hurry.  Recommend prior to surgery 11/21 because recovery will delay some things for a while    But no answer.  LVM to call the office.

## 2022-11-02 NOTE — TELEPHONE ENCOUNTER
----- Message from Gene Sterling MD sent at 11/1/2022  8:15 PM CDT -----  Please put on nurse schedule for 11/2 for call for remote BP (can try portal message too?)  Thanks!

## 2022-11-03 ENCOUNTER — OFFICE VISIT (OUTPATIENT)
Dept: UROGYNECOLOGY | Facility: CLINIC | Age: 57
End: 2022-11-03
Payer: COMMERCIAL

## 2022-11-03 ENCOUNTER — PROCEDURE VISIT (OUTPATIENT)
Dept: UROGYNECOLOGY | Facility: CLINIC | Age: 57
End: 2022-11-03
Payer: COMMERCIAL

## 2022-11-03 VITALS
DIASTOLIC BLOOD PRESSURE: 84 MMHG | SYSTOLIC BLOOD PRESSURE: 124 MMHG | HEIGHT: 64 IN | BODY MASS INDEX: 25.14 KG/M2 | WEIGHT: 147.25 LBS

## 2022-11-03 VITALS
HEIGHT: 64 IN | DIASTOLIC BLOOD PRESSURE: 84 MMHG | WEIGHT: 147.25 LBS | BODY MASS INDEX: 25.14 KG/M2 | SYSTOLIC BLOOD PRESSURE: 124 MMHG

## 2022-11-03 DIAGNOSIS — N39.46 URINARY INCONTINENCE, MIXED: Primary | ICD-10-CM

## 2022-11-03 DIAGNOSIS — N39.3 SUI (STRESS URINARY INCONTINENCE, FEMALE): ICD-10-CM

## 2022-11-03 DIAGNOSIS — N81.6 RECTOCELE, FEMALE: ICD-10-CM

## 2022-11-03 DIAGNOSIS — N81.11 CYSTOCELE, MIDLINE: ICD-10-CM

## 2022-11-03 DIAGNOSIS — C77.3 BREAST CANCER METASTASIZED TO AXILLARY LYMPH NODE, RIGHT: ICD-10-CM

## 2022-11-03 DIAGNOSIS — C50.911 BREAST CANCER METASTASIZED TO AXILLARY LYMPH NODE, RIGHT: ICD-10-CM

## 2022-11-03 DIAGNOSIS — R39.15 URINARY URGENCY: Primary | ICD-10-CM

## 2022-11-03 DIAGNOSIS — N39.46 URINARY INCONTINENCE, MIXED: ICD-10-CM

## 2022-11-03 LAB
BILIRUB SERPL-MCNC: NORMAL MG/DL
BLOOD URINE, POC: NORMAL
CLARITY, POC UA: CLEAR
COLOR, POC UA: NORMAL
GLUCOSE UR QL STRIP: NORMAL
KETONES UR QL STRIP: NORMAL
LEUKOCYTE ESTERASE URINE, POC: NORMAL
NITRITE, POC UA: NORMAL
PH, POC UA: 5
PROTEIN, POC: NORMAL
SPECIFIC GRAVITY, POC UA: 1.02
UROBILINOGEN, POC UA: NORMAL

## 2022-11-03 PROCEDURE — 51797 INTRAABDOMINAL PRESSURE TEST: CPT | Mod: S$GLB,,, | Performed by: OBSTETRICS & GYNECOLOGY

## 2022-11-03 PROCEDURE — 1159F MED LIST DOCD IN RCRD: CPT | Mod: CPTII,S$GLB,, | Performed by: OBSTETRICS & GYNECOLOGY

## 2022-11-03 PROCEDURE — 51728 PR COMPLEX CYSTOMETROGRAM VOIDING PRESSURE STUDIES: ICD-10-PCS | Mod: S$GLB,,, | Performed by: OBSTETRICS & GYNECOLOGY

## 2022-11-03 PROCEDURE — 3008F PR BODY MASS INDEX (BMI) DOCUMENTED: ICD-10-PCS | Mod: CPTII,S$GLB,, | Performed by: OBSTETRICS & GYNECOLOGY

## 2022-11-03 PROCEDURE — 1160F RVW MEDS BY RX/DR IN RCRD: CPT | Mod: CPTII,S$GLB,, | Performed by: OBSTETRICS & GYNECOLOGY

## 2022-11-03 PROCEDURE — 51784 PR ANAL/URINARY MUSCLE STUDY: ICD-10-PCS | Mod: 51,S$GLB,, | Performed by: OBSTETRICS & GYNECOLOGY

## 2022-11-03 PROCEDURE — 81002 POCT URINE DIPSTICK WITHOUT MICROSCOPE: ICD-10-PCS | Mod: S$GLB,,, | Performed by: OBSTETRICS & GYNECOLOGY

## 2022-11-03 PROCEDURE — 1160F PR REVIEW ALL MEDS BY PRESCRIBER/CLIN PHARMACIST DOCUMENTED: ICD-10-PCS | Mod: CPTII,S$GLB,, | Performed by: OBSTETRICS & GYNECOLOGY

## 2022-11-03 PROCEDURE — 52000 CYSTOURETHROSCOPY: CPT | Mod: 59,S$GLB,, | Performed by: OBSTETRICS & GYNECOLOGY

## 2022-11-03 PROCEDURE — 52000 PR CYSTOURETHROSCOPY: ICD-10-PCS | Mod: 59,S$GLB,, | Performed by: OBSTETRICS & GYNECOLOGY

## 2022-11-03 PROCEDURE — 81002 URINALYSIS NONAUTO W/O SCOPE: CPT | Mod: S$GLB,,, | Performed by: OBSTETRICS & GYNECOLOGY

## 2022-11-03 PROCEDURE — 51784 ANAL/URINARY MUSCLE STUDY: CPT | Mod: 51,S$GLB,, | Performed by: OBSTETRICS & GYNECOLOGY

## 2022-11-03 PROCEDURE — 99499 NO LOS: ICD-10-PCS | Mod: S$GLB,,, | Performed by: OBSTETRICS & GYNECOLOGY

## 2022-11-03 PROCEDURE — 51741 ELECTRO-UROFLOWMETRY FIRST: CPT | Mod: 51,S$GLB,, | Performed by: OBSTETRICS & GYNECOLOGY

## 2022-11-03 PROCEDURE — 51797 PR VOIDING PRESS STUDY INTRA-ABDOMINAL VOID: ICD-10-PCS | Mod: S$GLB,,, | Performed by: OBSTETRICS & GYNECOLOGY

## 2022-11-03 PROCEDURE — 3079F PR MOST RECENT DIASTOLIC BLOOD PRESSURE 80-89 MM HG: ICD-10-PCS | Mod: CPTII,S$GLB,, | Performed by: OBSTETRICS & GYNECOLOGY

## 2022-11-03 PROCEDURE — 99999 PR PBB SHADOW E&M-EST. PATIENT-LVL III: CPT | Mod: PBBFAC,,, | Performed by: OBSTETRICS & GYNECOLOGY

## 2022-11-03 PROCEDURE — 3074F PR MOST RECENT SYSTOLIC BLOOD PRESSURE < 130 MM HG: ICD-10-PCS | Mod: CPTII,S$GLB,, | Performed by: OBSTETRICS & GYNECOLOGY

## 2022-11-03 PROCEDURE — 51741 PR UROFLOWMETRY, COMPLEX: ICD-10-PCS | Mod: 51,S$GLB,, | Performed by: OBSTETRICS & GYNECOLOGY

## 2022-11-03 PROCEDURE — 99499 UNLISTED E&M SERVICE: CPT | Mod: S$GLB,,, | Performed by: OBSTETRICS & GYNECOLOGY

## 2022-11-03 PROCEDURE — 51728 CYSTOMETROGRAM W/VP: CPT | Mod: S$GLB,,, | Performed by: OBSTETRICS & GYNECOLOGY

## 2022-11-03 PROCEDURE — 3074F SYST BP LT 130 MM HG: CPT | Mod: CPTII,S$GLB,, | Performed by: OBSTETRICS & GYNECOLOGY

## 2022-11-03 PROCEDURE — 99999 PR PBB SHADOW E&M-EST. PATIENT-LVL III: ICD-10-PCS | Mod: PBBFAC,,, | Performed by: OBSTETRICS & GYNECOLOGY

## 2022-11-03 PROCEDURE — 1159F PR MEDICATION LIST DOCUMENTED IN MEDICAL RECORD: ICD-10-PCS | Mod: CPTII,S$GLB,, | Performed by: OBSTETRICS & GYNECOLOGY

## 2022-11-03 PROCEDURE — 3008F BODY MASS INDEX DOCD: CPT | Mod: CPTII,S$GLB,, | Performed by: OBSTETRICS & GYNECOLOGY

## 2022-11-03 PROCEDURE — 3079F DIAST BP 80-89 MM HG: CPT | Mod: CPTII,S$GLB,, | Performed by: OBSTETRICS & GYNECOLOGY

## 2022-11-03 RX ORDER — SULFAMETHOXAZOLE AND TRIMETHOPRIM 800; 160 MG/1; MG/1
1 TABLET ORAL
Status: COMPLETED | OUTPATIENT
Start: 2022-11-03 | End: 2022-11-03

## 2022-11-03 RX ADMIN — SULFAMETHOXAZOLE AND TRIMETHOPRIM 1 TABLET: 800; 160 TABLET ORAL at 09:11

## 2022-11-03 NOTE — PROGRESS NOTES
Urogyn follow up  11/03/2022    [facility] - UROGYNECOLOGY  [address] [address]    Corinne Morrison Marcus  [id number]  1965      Corinne Morrison Marcus is a 57 y.o. here for preop:    1)  UI:  + YELITZA (tennis, cough, sneeze) = (+) UUI (standing/spontaneous, walking)  X 14years.  (+) pads:2/day, usually severe wetness (should use more/day) and 0/night, does not endorse usual pads at night.  Daytime frequency: Q 1 hours.  Nocturia: No: 1/night.   (--) dysuria,  (--) hematuria,  (--) frequent UTIs, used to have frequent UTIs 8-10 yrs ago. + complete bladder emptying.   --was Rx'd oxybutynin but never tried  --has tried Kegels--didn't help  +YELITZA on UDS 11-3-2022     2)  POP:  Absent.  (--) vaginal bleeding. (--) vaginal discharge. (--) sexually active.  (--) dyspareunia. (--)  Vaginal dryness.  (--) vaginal estrogen use.   --POP-Q:  Aa -1; Ba -1; C -5; Ap -1; Bp -1; D -7.  Genital hiatus 3, perineal body 2, total vaginal length 10-11.    3)  BM:  (--) constipation/straining. Take 4 senekot a night.  (--) chronic diarrhea. (--) hematochezia.  (--) fecal incontinence.  (--) fecal smearing/urgency.  (--) complete evacuation.   --sees Dr. Solorzano      4)  Desires hysterectomy:  --for risk reduction with h/o breast CA  8/2022 , estradiol 17  --discussed with Ned/Freddie and will proceed with BSO  --pelvic US 10/2022 normal    Past Medical History       Past Medical History:   Diagnosis Date    Abnormal Pap smear of cervix      Abnormal Pap smear of vagina      Anxiety      Breast cancer 06/08/2022    Cancer      Constipation      Depression      Hemorrhoids      HLD (hyperlipidemia)      Lung nodule      Multinodular goiter 2010     s/p bx of 2 nodules in 2011 - benign   --Breast CA: Dx 6/2022. S/p B mastectomy + flap. No post chemo/radTx. Taking Femara.        Past Surgical History        Past Surgical History:   Procedure Laterality Date    APPENDECTOMY        BILATERAL MASTECTOMY  Bilateral 2022    BREAST RECONSTRUCTION Bilateral 2022    COLONOSCOPY         4 as of  [place] at [facility], next due in     COLONOSCOPY N/A 10/27/2021     Procedure: COLONOSCOPY;  Surgeon: Garland Whalen MD;  Location: [facility] (4TH FLR);  Service: Colon and Rectal;  Laterality: N/A;  Covid test 10/24 [place], instructions sent to [facility]   10/25 arrival time confirmed with pt-rb    COLPOSCOPY   2014    EXCISIONAL HEMORRHOIDECTOMY        TOTAL REDUCTION MAMMOPLASTY        TOTAL REDUCTION MAMMOPLASTY Bilateral 2022     Procedure: MAMMOPLASTY, REDUCTION;  Surgeon: Dex Gutierrez MD;  Location: [facility];  Service: Plastics;  Laterality: Bilateral;   RLQ appy     Hysterectomy: No     Past Ob History     x 5.  C/s x 0.    Largest infant weight: 9 lbs  no FAVD. yes episiotomy one time, probably number.       Gynecologic History  LMP: Patient's last menstrual period was 2019.  Age of menarche: 14  Age of menopause: 55  Menstrual history: regular, heavier after the kids  Pap test: , normal/HPV.  History of abnormal paps: Yes - some in the past, vaginal ultrasounds in the past, never significant findings.  History of STIs:  No  Mammogram: Date of last: 2021.  Result: Normal. Has a history of a recent breast cancer diagnosis.  Colonoscopy: Date of last: .  Result: diverticulosis, polyp. Repeat due:  .   DEXA:  scheduled    Issues include:  Patient Active Problem List   Diagnosis    Multinodular thyroid    Hypercholesterolemia    YELITZA (stress urinary incontinence, female)    Anxiety and depression    Solitary lung nodule    Grief reaction    Colon cancer screening    Macromastia    Breast cancer metastasized to axillary lymph node, right    Breast pain    Current moderate episode of major depressive disorder without prior episode    Cystocele, midline    Rectocele, female    Urinary incontinence, mixed     Medications:    Current Outpatient  "Medications:     atorvastatin (LIPITOR) 40 MG tablet, Take 1 tablet (40 mg total) by mouth once daily. (Patient taking differently: Take 40 mg by mouth every morning.), Disp: 90 tablet, Rfl: 3    cetirizine (ZYRTEC) 5 MG tablet, Take 5 mg by mouth once daily., Disp: , Rfl:     fluticasone (FLONASE) 50 mcg/actuation nasal spray, 1 spray by Each Nare route once daily., Disp: , Rfl:     letrozole (FEMARA) 2.5 mg Tab, Take 1 tablet (2.5 mg total) by mouth once daily., Disp: 30 tablet, Rfl: 2    methen-m.blue-s.phos-phsal-hyo (URIBEL) 118-10-40.8-36 mg Cap, Take 1 capsule by mouth 4 (four) times daily as needed (bladder irritability)., Disp: 30 capsule, Rfl: 11    ondansetron (ZOFRAN-ODT) 8 MG TbDL, Take 1 tablet (8 mg total) by mouth every 6 (six) hours as needed (nausea). (Patient not taking: Reported on 11/3/2022), Disp: 15 tablet, Rfl: 1    sertraline (ZOLOFT) 100 MG tablet, Take 1 tablet (100 mg total) by mouth once daily. (Patient taking differently: Take 100 mg by mouth every evening.), Disp: 90 tablet, Rfl: 3    spironolactone (ALDACTONE) 100 MG tablet, Take 100 mg by mouth every morning., Disp: , Rfl:     traZODone (DESYREL) 50 MG tablet, Take 1 tablet (50 mg total) by mouth every evening. (Patient not taking: Reported on 11/3/2022), Disp: 30 tablet, Rfl: 3  No current facility-administered medications for this visit.      ROS:  As per HPI.      Exam  /84   Ht 5' 4" (1.626 m)   Wt 66.8 kg (147 lb 4.3 oz)   LMP 03/18/2019   BMI 25.28 kg/m²   General: alert and oriented, no acute distress  Respiratory: normal respiratory effort  Abd: soft, non-tender, non-distended    Pelvic  Deferred to OR    Impression  1. Urinary incontinence, mixed        2. Breast cancer metastasized to axillary lymph node, right        3. YELITZA (stress urinary incontinence, female)        4. Cystocele, midline        5. Rectocele, female          We reviewed the above issues and discussed options for short-term versus long-term " management of her problems.   Plan:   Patient consented for laparoscopic hysterectomy, removal of tubes/ovaries (R/B reviewed, recc per GYN and ONC), possible uterosacral suspension, possible anterior/posterior repair with perineorrhaphy, possible laparotomy, placement of synthetic midurethral sling, and cystourethroscopy.   R/B/A reviewed. Specific risks reviewed include:  infection, bleeding, need for blood transfusion, damage to surrounding structures, anesthesia risks, death, heart attack, stroke, mesh erosion/extrusion, pain, dyspareunia, urinary retention, voiding dysfunction, urinary incontinence, exacerbation of urinary urge incontinence, and need for further surgeries.  We reviewed potential for failure of POP defect repair and need for future surgery, with no way of predicting risk.  She understands success rate of USS 75-90%.  Success rate of midurethral sling for YELITZA was reviewed as 80-85%, and she understands that this will not necessarily impact other types of urinary incontinence.  Understands may need to continue HRT postop.  R/B of HRT, adalberto in setting of breast CA, discussed.  Alternatives reviewed include: pessary/PT for POP and pessary/periurethral injections/PT/medication for YELITZA.    T&S, urine HCG on DOS  Preoperative appointment with PCP or cardiology: Yes - saw PCP (Hallie) 11-1-2022--cleared + labs 10/2022 normal; EKG--normal  VTE Prophylaxis:  heparin 5000 u SQ TID (1st dose 2hrs preop) + SCDs  HRT: management per Dr. Ned LING Cancer: management per Dr. Frazier  Patient instructed on Magnesium citrate and chlorahexadine/dial soap prep to perform day before & AM of surgery.   Proceed to OR for above-mentioned procedure.    --Discussed R/B of this, adalberto since she would like oophorectomy.  We discussed fact that oophorectomy may be associated with increase in bothersome menopausal symptoms and potentially negatively impact on bone density and cardiovascular health. She is getting a DEXA scan  in near future and willing to medically treat if needed.  She denies current menopausal symptoms and understands that, if these do increase, there are hormonal (not ideal candidate with h/o E2 sens breast CA) and non-hormonal treatments.  Discussed with Freddie/Ned: suggest BSO.      20 minutes were spent in face to face time with this patient  90 % of this time was spent in counseling and/or coordination of care     Sheree Holden MD  [facility]  Division of Female Pelvic Medicine and Reconstructive Surgery  Department of Obstetrics & Gynecology

## 2022-11-03 NOTE — H&P (VIEW-ONLY)
Urogyn follow up  11/03/2022    [facility] - UROGYNECOLOGY  [address] [address]    Corinne Morrison Marcus  [id number]  1965      Corinne Morrison Marcus is a 57 y.o. here for preop:    1)  UI:  + YELITZA (tennis, cough, sneeze) = (+) UUI (standing/spontaneous, walking)  X 14years.  (+) pads:2/day, usually severe wetness (should use more/day) and 0/night, does not endorse usual pads at night.  Daytime frequency: Q 1 hours.  Nocturia: No: 1/night.   (--) dysuria,  (--) hematuria,  (--) frequent UTIs, used to have frequent UTIs 8-10 yrs ago. + complete bladder emptying.   --was Rx'd oxybutynin but never tried  --has tried Kegels--didn't help  +YELITZA on UDS 11-3-2022     2)  POP:  Absent.  (--) vaginal bleeding. (--) vaginal discharge. (--) sexually active.  (--) dyspareunia. (--)  Vaginal dryness.  (--) vaginal estrogen use.   --POP-Q:  Aa -1; Ba -1; C -5; Ap -1; Bp -1; D -7.  Genital hiatus 3, perineal body 2, total vaginal length 10-11.    3)  BM:  (--) constipation/straining. Take 4 senekot a night.  (--) chronic diarrhea. (--) hematochezia.  (--) fecal incontinence.  (--) fecal smearing/urgency.  (--) complete evacuation.   --sees Dr. Solorzano      4)  Desires hysterectomy:  --for risk reduction with h/o breast CA  8/2022 , estradiol 17  --discussed with Ned/Freddie and will proceed with BSO  --pelvic US 10/2022 normal    Past Medical History       Past Medical History:   Diagnosis Date    Abnormal Pap smear of cervix      Abnormal Pap smear of vagina      Anxiety      Breast cancer 06/08/2022    Cancer      Constipation      Depression      Hemorrhoids      HLD (hyperlipidemia)      Lung nodule      Multinodular goiter 2010     s/p bx of 2 nodules in 2011 - benign   --Breast CA: Dx 6/2022. S/p B mastectomy + flap. No post chemo/radTx. Taking Femara.        Past Surgical History        Past Surgical History:   Procedure Laterality Date    APPENDECTOMY        BILATERAL MASTECTOMY  Bilateral 2022    BREAST RECONSTRUCTION Bilateral 2022    COLONOSCOPY         4 as of  [place] at [facility], next due in     COLONOSCOPY N/A 10/27/2021     Procedure: COLONOSCOPY;  Surgeon: Garland Whalen MD;  Location: [facility] (4TH FLR);  Service: Colon and Rectal;  Laterality: N/A;  Covid test 10/24 [place], instructions sent to [facility]   10/25 arrival time confirmed with pt-rb    COLPOSCOPY   2014    EXCISIONAL HEMORRHOIDECTOMY        TOTAL REDUCTION MAMMOPLASTY        TOTAL REDUCTION MAMMOPLASTY Bilateral 2022     Procedure: MAMMOPLASTY, REDUCTION;  Surgeon: Dex Gutierrez MD;  Location: [facility];  Service: Plastics;  Laterality: Bilateral;   RLQ appy     Hysterectomy: No     Past Ob History     x 5.  C/s x 0.    Largest infant weight: 9 lbs  no FAVD. yes episiotomy one time, probably number.       Gynecologic History  LMP: Patient's last menstrual period was 2019.  Age of menarche: 14  Age of menopause: 55  Menstrual history: regular, heavier after the kids  Pap test: , normal/HPV.  History of abnormal paps: Yes - some in the past, vaginal ultrasounds in the past, never significant findings.  History of STIs:  No  Mammogram: Date of last: 2021.  Result: Normal. Has a history of a recent breast cancer diagnosis.  Colonoscopy: Date of last: .  Result: diverticulosis, polyp. Repeat due:  .   DEXA:  scheduled    Issues include:  Patient Active Problem List   Diagnosis    Multinodular thyroid    Hypercholesterolemia    YELITZA (stress urinary incontinence, female)    Anxiety and depression    Solitary lung nodule    Grief reaction    Colon cancer screening    Macromastia    Breast cancer metastasized to axillary lymph node, right    Breast pain    Current moderate episode of major depressive disorder without prior episode    Cystocele, midline    Rectocele, female    Urinary incontinence, mixed     Medications:    Current Outpatient  "Medications:     atorvastatin (LIPITOR) 40 MG tablet, Take 1 tablet (40 mg total) by mouth once daily. (Patient taking differently: Take 40 mg by mouth every morning.), Disp: 90 tablet, Rfl: 3    cetirizine (ZYRTEC) 5 MG tablet, Take 5 mg by mouth once daily., Disp: , Rfl:     fluticasone (FLONASE) 50 mcg/actuation nasal spray, 1 spray by Each Nare route once daily., Disp: , Rfl:     letrozole (FEMARA) 2.5 mg Tab, Take 1 tablet (2.5 mg total) by mouth once daily., Disp: 30 tablet, Rfl: 2    methen-m.blue-s.phos-phsal-hyo (URIBEL) 118-10-40.8-36 mg Cap, Take 1 capsule by mouth 4 (four) times daily as needed (bladder irritability)., Disp: 30 capsule, Rfl: 11    ondansetron (ZOFRAN-ODT) 8 MG TbDL, Take 1 tablet (8 mg total) by mouth every 6 (six) hours as needed (nausea). (Patient not taking: Reported on 11/3/2022), Disp: 15 tablet, Rfl: 1    sertraline (ZOLOFT) 100 MG tablet, Take 1 tablet (100 mg total) by mouth once daily. (Patient taking differently: Take 100 mg by mouth every evening.), Disp: 90 tablet, Rfl: 3    spironolactone (ALDACTONE) 100 MG tablet, Take 100 mg by mouth every morning., Disp: , Rfl:     traZODone (DESYREL) 50 MG tablet, Take 1 tablet (50 mg total) by mouth every evening. (Patient not taking: Reported on 11/3/2022), Disp: 30 tablet, Rfl: 3  No current facility-administered medications for this visit.      ROS:  As per HPI.      Exam  /84   Ht 5' 4" (1.626 m)   Wt 66.8 kg (147 lb 4.3 oz)   LMP 03/18/2019   BMI 25.28 kg/m²   General: alert and oriented, no acute distress  Respiratory: normal respiratory effort  Abd: soft, non-tender, non-distended    Pelvic  Deferred to OR    Impression  1. Urinary incontinence, mixed        2. Breast cancer metastasized to axillary lymph node, right        3. YELITZA (stress urinary incontinence, female)        4. Cystocele, midline        5. Rectocele, female          We reviewed the above issues and discussed options for short-term versus long-term " management of her problems.   Plan:   Patient consented for laparoscopic hysterectomy, removal of tubes/ovaries (R/B reviewed, recc per GYN and ONC), possible uterosacral suspension, possible anterior/posterior repair with perineorrhaphy, possible laparotomy, placement of synthetic midurethral sling, and cystourethroscopy.   R/B/A reviewed. Specific risks reviewed include:  infection, bleeding, need for blood transfusion, damage to surrounding structures, anesthesia risks, death, heart attack, stroke, mesh erosion/extrusion, pain, dyspareunia, urinary retention, voiding dysfunction, urinary incontinence, exacerbation of urinary urge incontinence, and need for further surgeries.  We reviewed potential for failure of POP defect repair and need for future surgery, with no way of predicting risk.  She understands success rate of USS 75-90%.  Success rate of midurethral sling for YELITZA was reviewed as 80-85%, and she understands that this will not necessarily impact other types of urinary incontinence.  Understands may need to continue HRT postop.  R/B of HRT, adalberto in setting of breast CA, discussed.  Alternatives reviewed include: pessary/PT for POP and pessary/periurethral injections/PT/medication for YELITZA.    T&S, urine HCG on DOS  Preoperative appointment with PCP or cardiology: Yes - saw PCP (Hallie) 11-1-2022--cleared + labs 10/2022 normal; EKG--normal  VTE Prophylaxis:  heparin 5000 u SQ TID (1st dose 2hrs preop) + SCDs  HRT: management per Dr. Ned LING Cancer: management per Dr. Frazier  Patient instructed on Magnesium citrate and chlorahexadine/dial soap prep to perform day before & AM of surgery.   Proceed to OR for above-mentioned procedure.    --Discussed R/B of this, adalberto since she would like oophorectomy.  We discussed fact that oophorectomy may be associated with increase in bothersome menopausal symptoms and potentially negatively impact on bone density and cardiovascular health. She is getting a DEXA scan  in near future and willing to medically treat if needed.  She denies current menopausal symptoms and understands that, if these do increase, there are hormonal (not ideal candidate with h/o E2 sens breast CA) and non-hormonal treatments.  Discussed with Freddie/Ned: suggest BSO.      20 minutes were spent in face to face time with this patient  90 % of this time was spent in counseling and/or coordination of care     Sheree Holden MD  [facility]  Division of Female Pelvic Medicine and Reconstructive Surgery  Department of Obstetrics & Gynecology

## 2022-11-03 NOTE — PROCEDURES
TITLE OF OPERATION:  Complex cystometry.  Complex uroflowmetry.  Electromyography with surface electrodes.  Pressure voiding flow study.  Abdominal pressure measurement.  Leak point pressure measurement.    INDICATIONS:  1)  UI:  + YELITZA (tennis, cough, sneeze) = (+) UUI (standing/spontaneous, walking)  X 14years.  (+) pads:2/day, usually severe wetness (should use more/day) and 0/night, does not endorse usual pads at night.  Daytime frequency: Q 1 hours.  Nocturia: No: 1/night.   (--) dysuria,  (--) hematuria,  (--) frequent UTIs, used to have frequent UTIs 8-10 yrs ago. + complete bladder emptying.   --was Rx'd oxybutynin but never tried  --has tried Kegels--didn't help     2)  POP:  Absent.  (--) vaginal bleeding. (--) vaginal discharge. (--) sexually active.  (--) dyspareunia. (--)  Vaginal dryness.  (--) vaginal estrogen use.   --POP-Q:  Aa -1; Ba -1; C -5; Ap -1; Bp -1; D -7.  Genital hiatus 3, perineal body 2, total vaginal length 10-11.    3)  BM:  (--) constipation/straining. Take 4 senekot a night.  (--) chronic diarrhea. (--) hematochezia.  (--) fecal incontinence.  (--) fecal smearing/urgency.  (--) complete evacuation.   --sees Dr. Solorzano      4)  Desires hysterectomy:  --for risk reduction with h/o breast CA  8/2022 , estradiol 17    PREOPERATIVE DIAGNOSIS:  1)  Mixed urinary incontinence  2)  Stage 2 cystocele  3)  Stage 2 rectocele  4)  History of estrogen-sensitive breast cancer    POSTOPERATIVE DIAGNOSIS:  1)  Mixed urinary incontinence  2)  Stage 2 cystocele  3)  Stage 2 rectocele  4)  History of estrogen-sensitive breast cancer  5)  Concern for voiding dysfunction (Valsalva assist)  6)  Urodynamic stress incontinence    ANESTHESIA:  None.    SPECIMEN (BACTERIOLOGICAL, PATHOLOGICAL OR OTHER):  None.    PROSTHETIC DEVICE/IMPLANT:  None.    SURGEONS NARRATIVE:  A time out was performed in which the patient identity and procedure were confirmed.  Urodynamic evaluation was performed using a  computerized system ([employer]  Uroflowmetry was performed on the patient in the sitting position without catheters in place.  Subsequent urodynamic testing was performed with the patient in the lithotomy position at 45 degrees. Air charged catheters were used with sterile water as the infusion medium. Vesical and abdominal (rectal) pressures were measured, and detrusor pressure was calculated. EMG activity was recorded with surface electrodes. During filling, room temperature sterile water was infused at a rate of 30 cubic centimeters per minute. The patient was asked cough after instillation of each 100cc volume. Two Valsalva leak point pressures and two cough leak point pressures were performed with the catheters in place at 300 cubic centimeters and again at maximum capacity. Valsalva leak point pressure was defined as the difference between vesical pressure at which leakage was noted (visualized at the external urethral meatus) and the baseline vesical pressure. Following urodynamic testing, a pressure flow study was performed with the patient in the sitting position. Vesical and abdominal pressures were monitored and detrusor pressures were calculated. After the pressure flow study, the catheters were then removed. The patient tolerated the procedure well.     Urine dipstick: neg.    1.  VOIDING PHASE:      a.  Uroflowmetry:  Prolapse reduction: No  Voided volume:  58 mL   PVR:   5 mL    The overall configuration of this uroflow study was with insufficient volume for interpretation.      b.  Pressure flow:  Prolapse reduction: No  Voided volume:   472 mL  Voiding time:   42 seconds  Peak flow:  59 mL/s   Avg flow:  14 mL/s  Max det pressure:  43  cm H20  Det pressure at max flow: 6 cm H20  Void initiated by Valsalva followed by detrusor contraction.    Urethral relaxation (EMG):  absent.    PVR (calculated):  0 mL    The overall configuration of this pressure flow study was prolonged with  concern for voiding dysfunction (Valsalva assist).      2.  FILLING PHASE:  1st desire: 121 mL  Normal desire:  213 mL  Strong desire:  301 mL  Urgency:  381 mL  Compliance (calculated)  approx 100 mL/cm H20  EMG activity during filling:  stable  Detrusor contractions observed: No.      3.  URETHRAL FUNCTION/STORAGE PHASE:    a.  WITHOUT prolapse reduction:  CLPP (150 mL): Positive  at  167 cm H20  VLPP (150 mL): Positive  at  124 cm H20   CLPP (MAX ):    Positive  at  117 cm H20  VLPP (MAX):     Positive  at  42 cm H20      These findings are consistent with Positive urodynamic stress incontinence.    Assessment:  UF with insufficient volume for interpretation.  PF prolonged with concern for voiding dysfunction (Valsalva assist).  Compliance normal.  Max capacity 472 mL.  DO (--).  YELITZA (+).      Plan:    1)  Mixed urinary incontinence, urge = stress:    --Empty bladder every 3 hours.  Empty well: wait a minute, lean forward on toilet.    --Avoid dietary irritants (see sheet).  Keep diary x 3-5 days to determine your irritants.  --KEGELS: do 10 in AM and 10 in PM, holding each x 10 seconds.  When you feel urge to go, STOP, KEGEL, and when urge has passed, then go to bathroom.  Consider PT in future.    --URGE: consider medication in future. Takes 2-4 weeks to see if will have effect.  For dry mouth: get sour, sugar free lozenge or gum.    --STRESS:  Pessary vs. Periurethral injections vs. Sling.               --needs UDS/cysto preop     2)  H/o E2 sensitive breast cancer, on femara:  --desires hysterectomy + salpingo-oophorectomy for risk reduction              --if surgery: TLH/BSO  --stage 1-2 cystocele/rectocele: currently asymptomatic but add USS of needed              --if surgery: need UDS/cysto to see if need concomitant sling              --if surgery: pelvic US normal 10/2022              --if surgery:  saw PCP (Hallie) 11-2-2022 + labs 10/2022 normal; T&S, EKG--normal              --she would like surgery  2022 if possible as has met deductible  --Discussed R/B of this, adalberto since she would like oophorectomy.  We discussed fact that oophorectomy may be associated with increase in bothersome menopausal symptoms and potentially negatively impact on bone density and cardiovascular health. She is getting a DEXA scan in near future and willing to medically treat if needed.  She denies current menopausal symptoms and understands that, if these do increase, there are hormonal (not ideal candidate with h/o E2 sens breast CA) and non-hormonal treatments.  Discussed with Kb: suggest BSO.     ---------------------------------------------------    Title of Operation:   Cystourethroscopy.     INDICATIONS:  As above    PREOPERATIVE DIAGNOSIS  As above    POSTOPERATIVE DIAGNOSIS:   As above    Specimen (Bacteriological, Pathological or other):   None.     Prosthetic Device/Implant:   None.     Surgeons Narrative:     After informed consent was obtained, the patient was placed in the lithotomy position. The urethral meatus was prepped with Betadine and 10 cubic centimeters of 2% Xylocaine gel were introduced into the urethra. A flexible cystourethroscope was introduced into the bladder. The bladder was distended with approximately 300 cubic centimeters of sterile water. A systematic survey was performed in which the bladder was surveyed using multiple sequential passes in a clockwise fashion from the bladder dome to the bladder base to the urethrovesical junction. The trigone and ureteral orifices were observed. The scope was then flipped back on itself, and the urethrovesical junction was viewed. A vaginal examining finger was then placed with pressure suburethrally at the urethrovesical junction as the telescope was withdrawn in order to perform positive pressure urethroscopy.  Standard maneuvers of cough, squeeze and Valsalva were performed. The telescope was then completely withdrawn.     Findings: Urethroscopy:   Normal.  Cystoscopy:  Normal bladder mucosa, bilateral ureteral flow was noted.     Assessment: Essentially normal cystourethroscopy.     Plan: The patient will follow up with Dr. Holden as scheduled.  See urodynamics note from 11-3-2022 for further plan details.

## 2022-11-03 NOTE — TELEPHONE ENCOUNTER
Spoke to Ms. Rogelio and informed her per Dr. Sterling that Please help schedule repeat US thyroid, no particular hurry.  Recommend prior to surgery 11/21 because recovery will delay some things for a while.    Patient confirmed appt date and time.  Patient states understanding with no further questions or concerns.

## 2022-11-06 ENCOUNTER — TELEPHONE (OUTPATIENT)
Dept: UROGYNECOLOGY | Facility: CLINIC | Age: 57
End: 2022-11-06
Payer: COMMERCIAL

## 2022-11-06 NOTE — TELEPHONE ENCOUNTER
----- Message from Gene Sterling MD sent at 11/6/2022  3:39 AM CST -----  Good morning,  Pt is medically optimized for surgery  Thanks, JL

## 2022-11-06 NOTE — TELEPHONE ENCOUNTER
----- Message from Nelia Frazier MD sent at 10/3/2022 10:57 AM CDT -----  Regarding: RE: patient desires hyst/BSO  Her invitae genetics were negative  Thanks Crystal for scanning into the chart  She does not require a OSMEL?BSO- I know she has wanted ovaries out due to her personal and FH around cancer- opts for this to block ovaries  We can further discuss with her    ----- Message -----  From: Pam Patino RN  Sent: 10/3/2022  10:07 AM CDT  To: Keshia Marino MD, Sheree Holden MD, #  Subject: RE: patient desires hyst/BSO                     Uploaded results into media  Crystal  ----- Message -----  From: Nelia Frazier MD  Sent: 10/3/2022   6:57 AM CDT  To: Keshia Marino MD, Sheree Holden MD, #  Subject: RE: patient desires hyst/BSO                     Yes we went over this with her- this looked good and did not require chemo  I was actually referring to germ line genetic testing- looping navigator in as I was unable to reach patient Friday  Thanks      ----- Message -----  From: Sheree Holden MD  Sent: 10/2/2022   4:19 PM CDT  To: Keshia Marino MD, Nelia Frazier MD  Subject: RE: patient desires hyst/BSO                     Looks like she had oncotype test done--scanned into media 7/13/2022.   Appreciate your thoughts :)  Sheree  ----- Message -----  From: Nelia Frazier MD  Sent: 9/30/2022   4:09 PM CDT  To: Keshia Marino MD, Sheree Holden MD  Subject: RE: patient desires hyst/BSO                     Let me verify she had genetic testing done as hher surgery was done elsewhere    ----- Message -----  From: Sheree Holden MD  Sent: 9/30/2022   1:04 PM CDT  To: Keshia Marino MD, Nelia Frazier MD  Subject: patient desires hyst/BSO                         Hi y'all: I saw Mrs. Mercado this week. H/o estrogen sens breast CA, s/p B mastectomy, now on femara. She desires risk-reducing hyst/BSO.  Her FSH was 121, esradiol in teens 8/2022.  Any reason from your end she  can't/shouldn't pursue this. We discussed potential menopausal symptoms (need for non-hormonal Tx) and need to follow bone density/cardiac risk factors.  She was ok with this.     Also, If you are ok with this, would one of y'all be willing to write a short letter of support in her chart that the surgery may help risk reduce in case we need to help with insurance coverage?    Thanks so much!  Best,   Sheree Holden  Urogyn

## 2022-11-10 ENCOUNTER — OFFICE VISIT (OUTPATIENT)
Dept: HEMATOLOGY/ONCOLOGY | Facility: CLINIC | Age: 57
End: 2022-11-10
Payer: COMMERCIAL

## 2022-11-10 VITALS
TEMPERATURE: 98 F | WEIGHT: 149.06 LBS | RESPIRATION RATE: 18 BRPM | BODY MASS INDEX: 25.45 KG/M2 | DIASTOLIC BLOOD PRESSURE: 69 MMHG | SYSTOLIC BLOOD PRESSURE: 128 MMHG | HEART RATE: 92 BPM | HEIGHT: 64 IN | OXYGEN SATURATION: 100 %

## 2022-11-10 DIAGNOSIS — C50.911 MALIGNANT NEOPLASM OF RIGHT BREAST IN FEMALE, ESTROGEN RECEPTOR POSITIVE, UNSPECIFIED SITE OF BREAST: Primary | ICD-10-CM

## 2022-11-10 DIAGNOSIS — T45.1X5A AROMATASE INHIBITOR-ASSOCIATED ARTHRALGIA: ICD-10-CM

## 2022-11-10 DIAGNOSIS — Z79.811 AROMATASE INHIBITOR USE: ICD-10-CM

## 2022-11-10 DIAGNOSIS — C77.3 BREAST CANCER METASTASIZED TO AXILLARY LYMPH NODE, RIGHT: ICD-10-CM

## 2022-11-10 DIAGNOSIS — Z17.0 MALIGNANT NEOPLASM OF RIGHT BREAST IN FEMALE, ESTROGEN RECEPTOR POSITIVE, UNSPECIFIED SITE OF BREAST: Primary | ICD-10-CM

## 2022-11-10 DIAGNOSIS — E55.9 VITAMIN D DEFICIENCY: ICD-10-CM

## 2022-11-10 DIAGNOSIS — M25.50 AROMATASE INHIBITOR-ASSOCIATED ARTHRALGIA: ICD-10-CM

## 2022-11-10 DIAGNOSIS — C50.911 BREAST CANCER METASTASIZED TO AXILLARY LYMPH NODE, RIGHT: ICD-10-CM

## 2022-11-10 PROCEDURE — 3078F DIAST BP <80 MM HG: CPT | Mod: CPTII,S$GLB,, | Performed by: NURSE PRACTITIONER

## 2022-11-10 PROCEDURE — 3078F PR MOST RECENT DIASTOLIC BLOOD PRESSURE < 80 MM HG: ICD-10-PCS | Mod: CPTII,S$GLB,, | Performed by: NURSE PRACTITIONER

## 2022-11-10 PROCEDURE — 1159F MED LIST DOCD IN RCRD: CPT | Mod: CPTII,S$GLB,, | Performed by: NURSE PRACTITIONER

## 2022-11-10 PROCEDURE — 3008F BODY MASS INDEX DOCD: CPT | Mod: CPTII,S$GLB,, | Performed by: NURSE PRACTITIONER

## 2022-11-10 PROCEDURE — 3074F SYST BP LT 130 MM HG: CPT | Mod: CPTII,S$GLB,, | Performed by: NURSE PRACTITIONER

## 2022-11-10 PROCEDURE — 99215 PR OFFICE/OUTPT VISIT, EST, LEVL V, 40-54 MIN: ICD-10-PCS | Mod: S$GLB,,, | Performed by: NURSE PRACTITIONER

## 2022-11-10 PROCEDURE — 99215 OFFICE O/P EST HI 40 MIN: CPT | Mod: S$GLB,,, | Performed by: NURSE PRACTITIONER

## 2022-11-10 PROCEDURE — 99999 PR PBB SHADOW E&M-EST. PATIENT-LVL III: CPT | Mod: PBBFAC,,, | Performed by: NURSE PRACTITIONER

## 2022-11-10 PROCEDURE — 3008F PR BODY MASS INDEX (BMI) DOCUMENTED: ICD-10-PCS | Mod: CPTII,S$GLB,, | Performed by: NURSE PRACTITIONER

## 2022-11-10 PROCEDURE — 1159F PR MEDICATION LIST DOCUMENTED IN MEDICAL RECORD: ICD-10-PCS | Mod: CPTII,S$GLB,, | Performed by: NURSE PRACTITIONER

## 2022-11-10 PROCEDURE — 99999 PR PBB SHADOW E&M-EST. PATIENT-LVL III: ICD-10-PCS | Mod: PBBFAC,,, | Performed by: NURSE PRACTITIONER

## 2022-11-10 PROCEDURE — 3074F PR MOST RECENT SYSTOLIC BLOOD PRESSURE < 130 MM HG: ICD-10-PCS | Mod: CPTII,S$GLB,, | Performed by: NURSE PRACTITIONER

## 2022-11-10 NOTE — PROGRESS NOTES
Subjective:       Patient ID: Corinne Morrison Marcus is a 57 y.o. female.    Chief Complaint: Malignant neoplasm of right breast in female, estrogen rece    HPI     Returns for follow up  Labs confirmed post menopausal status  Revision surgery 2022 - did well.   Started letrozole mid 2022    +hot flashes  +joint pain. Cryotherapy helping with hands.   Ankles also hurt in AM but better after started moving around  Stays active- played tennis today.   Taking trazadone and sleeping better.  Wants PET scan  Friend passed away today from breast cancer     a year ago  She is seeing psychiatry and this is helping.     Hysterectomy 2022    History:  Patient is a  56-year-old female w/ hx of HLD, depression, who underwent bilateral breast reduction due to macromastia.  Incidentally patient was found to have right breast invasive carcinoma during resection. Subsequently underwent b/l mastectomy with reconstruction + R SLNB and R ALND on 22.      Prior screening mammograms w/o mass.   MRI breast without any obvious masses or lymph node involvement.     Tumor size:  1.8  Tumor type:  Right breast invasive carcinoma  Tumor rdgrdrrdarddrderd:rd rd3rd Lymph node status: 1/3 sentinel LN   Hormone status:  ER +, FL +  HER2 Kendra status: -ve  Other:  Rare focus of lymphovascular invasion     SH: Working as a laywer. . Three boys and two girls - 25, 23, 21, 19, 14.  Gyn Hx: Menses 15 y/o, post-menopausal 54 y/o  FH:  had metastatic melanoma, Maternal grandmother - breast ca 69 y/o  Paternal aunt - breast ca 51 y/o        Review of Systems   Constitutional:  Negative for activity change, appetite change, chills, fatigue, fever and unexpected weight change.   HENT:  Negative for nasal congestion, dental problem, ear pain, hearing loss, mouth sores, nosebleeds, rhinorrhea, tinnitus and trouble swallowing.    Eyes:  Negative for visual disturbance.   Respiratory:  Negative for cough, chest tightness, shortness of  breath and wheezing.    Cardiovascular:  Negative for chest pain, palpitations and leg swelling.   Gastrointestinal:  Negative for abdominal distention, abdominal pain, blood in stool, constipation, diarrhea, nausea and vomiting.   Genitourinary:  Negative for decreased urine volume, difficulty urinating, dysuria, frequency and hematuria.   Musculoskeletal:  Positive for arthralgias. Negative for back pain, gait problem, joint swelling and neck pain.   Integumentary:  Negative for pallor and rash.   Neurological:  Negative for dizziness, weakness, light-headedness, numbness and headaches.   Hematological:  Negative for adenopathy. Does not bruise/bleed easily.   Psychiatric/Behavioral:  Negative for confusion, dysphoric mood and sleep disturbance.        Objective:      Physical Exam  Vitals and nursing note reviewed.   Constitutional:       General: She is not in acute distress.     Appearance: Normal appearance. She is well-developed.      Comments: Presents alone  Very pleasant   HENT:      Head: Normocephalic and atraumatic.   Eyes:      General: Lids are normal. No scleral icterus.     Conjunctiva/sclera: Conjunctivae normal.      Pupils: Pupils are equal, round, and reactive to light.   Neck:      Thyroid: No thyromegaly.      Vascular: No JVD.      Trachea: Trachea normal.   Cardiovascular:      Rate and Rhythm: Normal rate and regular rhythm.      Heart sounds: Normal heart sounds.   Pulmonary:      Effort: Pulmonary effort is normal.      Breath sounds: Normal breath sounds. No wheezing.      Comments: Reconstruction sites healing well. No masses or LAD  Abdominal:      General: Bowel sounds are normal. There is no distension.      Palpations: Abdomen is soft. There is no mass.      Tenderness: There is no abdominal tenderness.      Comments: No organomegaly.   Abd incision healing as well.    Musculoskeletal:         General: Normal range of motion.      Cervical back: Normal range of motion and neck  supple.      Comments: No spinal or paraspinal tenderness.    Lymphadenopathy:      Head:      Right side of head: No submental or submandibular adenopathy.      Left side of head: No submental or submandibular adenopathy.      Cervical: No cervical adenopathy.      Upper Body:      Right upper body: No supraclavicular or axillary adenopathy.      Left upper body: No supraclavicular or axillary adenopathy.   Skin:     General: Skin is warm and dry.      Capillary Refill: Capillary refill takes less than 2 seconds.      Findings: No bruising or rash.      Nails: There is no clubbing.   Neurological:      Mental Status: She is alert and oriented to person, place, and time.   Psychiatric:         Mood and Affect: Mood normal.         Speech: Speech normal.         Behavior: Behavior normal.       Assessment:       Problem List Items Addressed This Visit          Oncology    Breast cancer metastasized to axillary lymph node, right     Other Visit Diagnoses       Malignant neoplasm of right breast in female, estrogen receptor positive, unspecified site of breast    -  Primary    Relevant Orders    CBC Oncology    Comprehensive Metabolic Panel    Vitamin D    Aromatase inhibitor use        Aromatase inhibitor-associated arthralgia        Vitamin D deficiency        Relevant Orders    Vitamin D            Plan:       Overall doing well, clinically negative  Continue Letrozole   Needs BMD ASAP  Continue Vit D  Will do cryotherapy for joint pain as success in past.   Patient requests a PET and would like to resubmit for insurance approval.   Plans to get hysterectomy- scheduled for next week.   All questions answered.   Reassurance given. Continue f/u with Psych for support    Route Chart for Scheduling    Med Onc Chart Routing  Urgent    Follow up with physician 3 months. with cbc, cmp, vit d   Follow up with SHAUN    Infusion scheduling note    Injection scheduling note    Labs    Imaging   Please move BMD to this month as  need asap; PET this month or next   Pharmacy appointment    Other referrals        Patient is in agreement with the proposed treatment plan. All questions were answered to the patient's satisfaction. Pt knows to call clinic for any new or worsening symptoms and if anything is needed before the next clinic visit.      Jorge Ely, ANMOLP-C  Hematology & Oncology  [address] [place] [address]  ph. [phone]  Fax. [phone]     Face to Face time with patient: 30 minutes  40 minutes of total time spent on the encounter, which includes face to face time and non-face to face time preparing to see the patient (eg, review of tests), Obtaining and/or reviewing separately obtained history, Documenting clinical information in the electronic or other health record, Independently interpreting results (not separately reported) and communicating results to the patient/family/caregiver, or Care coordination (not separately reported).

## 2022-11-17 ENCOUNTER — APPOINTMENT (OUTPATIENT)
Dept: RADIOLOGY | Facility: CLINIC | Age: 57
End: 2022-11-17
Attending: INTERNAL MEDICINE
Payer: COMMERCIAL

## 2022-11-17 DIAGNOSIS — C50.911 MALIGNANT NEOPLASM OF RIGHT BREAST IN FEMALE, ESTROGEN RECEPTOR POSITIVE, UNSPECIFIED SITE OF BREAST: ICD-10-CM

## 2022-11-17 DIAGNOSIS — Z17.0 MALIGNANT NEOPLASM OF RIGHT BREAST IN FEMALE, ESTROGEN RECEPTOR POSITIVE, UNSPECIFIED SITE OF BREAST: ICD-10-CM

## 2022-11-17 PROCEDURE — 77080 DXA BONE DENSITY AXIAL: CPT | Mod: TC,PO

## 2022-11-17 PROCEDURE — 77080 DEXA BONE DENSITY SPINE HIP: ICD-10-PCS | Mod: 26,,, | Performed by: INTERNAL MEDICINE

## 2022-11-17 PROCEDURE — 77080 DXA BONE DENSITY AXIAL: CPT | Mod: 26,,, | Performed by: INTERNAL MEDICINE

## 2022-11-18 ENCOUNTER — TELEPHONE (OUTPATIENT)
Dept: UROGYNECOLOGY | Facility: CLINIC | Age: 57
End: 2022-11-18
Payer: COMMERCIAL

## 2022-11-18 NOTE — TELEPHONE ENCOUNTER
Called patient regarding her arrival time for surgery on Monday. No answer, left message.    Called patient's emergency contact. Informed Madiha that patient's surgery is scheduled for Monday, 11/21/22 at [facility]. Surgery begins at 7am, so to please be at the hospital at 5am for pre-admit. Madiha verbalized understanding, will pass along information to patient.

## 2022-11-19 ENCOUNTER — ANESTHESIA EVENT (OUTPATIENT)
Dept: SURGERY | Facility: HOSPITAL | Age: 57
End: 2022-11-19
Payer: COMMERCIAL

## 2022-11-21 ENCOUNTER — HOSPITAL ENCOUNTER (OUTPATIENT)
Facility: HOSPITAL | Age: 57
Discharge: HOME | End: 2022-11-21
Attending: OBSTETRICS & GYNECOLOGY | Admitting: OBSTETRICS & GYNECOLOGY
Payer: COMMERCIAL

## 2022-11-21 ENCOUNTER — PATIENT MESSAGE (OUTPATIENT)
Dept: UROGYNECOLOGY | Facility: CLINIC | Age: 57
End: 2022-11-21
Payer: COMMERCIAL

## 2022-11-21 ENCOUNTER — ANESTHESIA (OUTPATIENT)
Dept: SURGERY | Facility: HOSPITAL | Age: 57
End: 2022-11-21
Payer: COMMERCIAL

## 2022-11-21 DIAGNOSIS — N81.11 CYSTOCELE, MIDLINE: ICD-10-CM

## 2022-11-21 DIAGNOSIS — C77.3 BREAST CANCER METASTASIZED TO AXILLARY LYMPH NODE, RIGHT: ICD-10-CM

## 2022-11-21 DIAGNOSIS — Z90.710 S/P LAPAROSCOPIC HYSTERECTOMY: ICD-10-CM

## 2022-11-21 DIAGNOSIS — N39.3 SUI (STRESS URINARY INCONTINENCE, FEMALE): ICD-10-CM

## 2022-11-21 DIAGNOSIS — N39.3 STRESS INCONTINENCE: ICD-10-CM

## 2022-11-21 DIAGNOSIS — N81.6 RECTOCELE, FEMALE: Primary | ICD-10-CM

## 2022-11-21 DIAGNOSIS — C50.911 BREAST CANCER METASTASIZED TO AXILLARY LYMPH NODE, RIGHT: ICD-10-CM

## 2022-11-21 PROCEDURE — 88305 TISSUE EXAM BY PATHOLOGIST: CPT | Performed by: PATHOLOGY

## 2022-11-21 PROCEDURE — 57288 REPAIR BLADDER DEFECT: CPT | Mod: 51,,, | Performed by: OBSTETRICS & GYNECOLOGY

## 2022-11-21 PROCEDURE — 58571 PR LAPAROSCOPY W TOT HYSTERECTUTERUS <=250 GRAM  W TUBE/OVARY: ICD-10-PCS | Mod: 51,,, | Performed by: OBSTETRICS & GYNECOLOGY

## 2022-11-21 PROCEDURE — D9220A PRA ANESTHESIA: ICD-10-PCS | Mod: ANES,,, | Performed by: ANESTHESIOLOGY

## 2022-11-21 PROCEDURE — 25000003 PHARM REV CODE 250: Mod: PO | Performed by: STUDENT IN AN ORGANIZED HEALTH CARE EDUCATION/TRAINING PROGRAM

## 2022-11-21 PROCEDURE — 63600175 PHARM REV CODE 636 W HCPCS: Mod: PO | Performed by: ANESTHESIOLOGY

## 2022-11-21 PROCEDURE — 36000710: Mod: PO | Performed by: OBSTETRICS & GYNECOLOGY

## 2022-11-21 PROCEDURE — 37000008 HC ANESTHESIA 1ST 15 MINUTES: Mod: PO | Performed by: OBSTETRICS & GYNECOLOGY

## 2022-11-21 PROCEDURE — D9220A PRA ANESTHESIA: Mod: CRNA,,, | Performed by: NURSE ANESTHETIST, CERTIFIED REGISTERED

## 2022-11-21 PROCEDURE — 71000039 HC RECOVERY, EACH ADD'L HOUR: Mod: PO | Performed by: OBSTETRICS & GYNECOLOGY

## 2022-11-21 PROCEDURE — 25000003 PHARM REV CODE 250: Mod: PO | Performed by: NURSE ANESTHETIST, CERTIFIED REGISTERED

## 2022-11-21 PROCEDURE — 25000003 PHARM REV CODE 250: Mod: PO | Performed by: ANESTHESIOLOGY

## 2022-11-21 PROCEDURE — 63600175 PHARM REV CODE 636 W HCPCS: Mod: PO | Performed by: NURSE ANESTHETIST, CERTIFIED REGISTERED

## 2022-11-21 PROCEDURE — D9220A PRA ANESTHESIA: Mod: ANES,,, | Performed by: ANESTHESIOLOGY

## 2022-11-21 PROCEDURE — 57425 PR LAPAROSCOPY, SURG, COLPOPEXY: ICD-10-PCS | Mod: ,,, | Performed by: OBSTETRICS & GYNECOLOGY

## 2022-11-21 PROCEDURE — 57425 LAPAROSCOPY SURG COLPOPEXY: CPT | Mod: ,,, | Performed by: OBSTETRICS & GYNECOLOGY

## 2022-11-21 PROCEDURE — 58571 TLH W/T/O 250 G OR LESS: CPT | Mod: 51,,, | Performed by: OBSTETRICS & GYNECOLOGY

## 2022-11-21 PROCEDURE — 27201423 OPTIME MED/SURG SUP & DEVICES STERILE SUPPLY: Mod: PO | Performed by: OBSTETRICS & GYNECOLOGY

## 2022-11-21 PROCEDURE — C2628 CATHETER, OCCLUSION: HCPCS | Mod: PO | Performed by: OBSTETRICS & GYNECOLOGY

## 2022-11-21 PROCEDURE — 57288 PR SLING OPER STRES INCONTINENCE: ICD-10-PCS | Mod: 51,,, | Performed by: OBSTETRICS & GYNECOLOGY

## 2022-11-21 PROCEDURE — 88305 TISSUE EXAM BY PATHOLOGIST: CPT | Mod: 26,,, | Performed by: PATHOLOGY

## 2022-11-21 PROCEDURE — 63600175 PHARM REV CODE 636 W HCPCS: Mod: PO | Performed by: OBSTETRICS & GYNECOLOGY

## 2022-11-21 PROCEDURE — 27100025 HC TUBING, SET FLUID WARMER: Mod: PO | Performed by: ANESTHESIOLOGY

## 2022-11-21 PROCEDURE — D9220A PRA ANESTHESIA: ICD-10-PCS | Mod: CRNA,,, | Performed by: NURSE ANESTHETIST, CERTIFIED REGISTERED

## 2022-11-21 PROCEDURE — 71000016 HC POSTOP RECOV ADDL HR: Mod: PO | Performed by: OBSTETRICS & GYNECOLOGY

## 2022-11-21 PROCEDURE — 36000711: Mod: PO | Performed by: OBSTETRICS & GYNECOLOGY

## 2022-11-21 PROCEDURE — 37000009 HC ANESTHESIA EA ADD 15 MINS: Mod: PO | Performed by: OBSTETRICS & GYNECOLOGY

## 2022-11-21 PROCEDURE — 71000015 HC POSTOP RECOV 1ST HR: Mod: PO | Performed by: OBSTETRICS & GYNECOLOGY

## 2022-11-21 PROCEDURE — 25000003 PHARM REV CODE 250: Mod: PO | Performed by: OBSTETRICS & GYNECOLOGY

## 2022-11-21 PROCEDURE — 88305 TISSUE EXAM BY PATHOLOGIST: ICD-10-PCS | Mod: 26,,, | Performed by: PATHOLOGY

## 2022-11-21 PROCEDURE — C1771 REP DEV, URINARY, W/SLING: HCPCS | Mod: PO | Performed by: OBSTETRICS & GYNECOLOGY

## 2022-11-21 PROCEDURE — 71000033 HC RECOVERY, INTIAL HOUR: Mod: PO | Performed by: OBSTETRICS & GYNECOLOGY

## 2022-11-21 DEVICE — SLING FIT ADVANTAGE: Type: IMPLANTABLE DEVICE | Site: ABDOMEN | Status: FUNCTIONAL

## 2022-11-21 RX ORDER — LIDOCAINE HYDROCHLORIDE 20 MG/ML
INJECTION INTRAVENOUS
Status: DISCONTINUED | OUTPATIENT
Start: 2022-11-21 | End: 2022-11-21

## 2022-11-21 RX ORDER — FENTANYL CITRATE 50 UG/ML
25 INJECTION, SOLUTION INTRAMUSCULAR; INTRAVENOUS EVERY 5 MIN PRN
Status: COMPLETED | OUTPATIENT
Start: 2022-11-21 | End: 2022-11-21

## 2022-11-21 RX ORDER — FENTANYL CITRATE 50 UG/ML
INJECTION, SOLUTION INTRAMUSCULAR; INTRAVENOUS
Status: DISCONTINUED | OUTPATIENT
Start: 2022-11-21 | End: 2022-11-21

## 2022-11-21 RX ORDER — KETOROLAC TROMETHAMINE 30 MG/ML
INJECTION, SOLUTION INTRAMUSCULAR; INTRAVENOUS
Status: DISCONTINUED | OUTPATIENT
Start: 2022-11-21 | End: 2022-11-21

## 2022-11-21 RX ORDER — MIDAZOLAM HYDROCHLORIDE 1 MG/ML
INJECTION, SOLUTION INTRAMUSCULAR; INTRAVENOUS
Status: DISCONTINUED | OUTPATIENT
Start: 2022-11-21 | End: 2022-11-21

## 2022-11-21 RX ORDER — IBUPROFEN 600 MG/1
600 TABLET ORAL EVERY 6 HOURS PRN
Status: DISCONTINUED | OUTPATIENT
Start: 2022-11-21 | End: 2022-11-21 | Stop reason: HOSPADM

## 2022-11-21 RX ORDER — ONDANSETRON 8 MG/1
8 TABLET, ORALLY DISINTEGRATING ORAL ONCE
Status: COMPLETED | OUTPATIENT
Start: 2022-11-21 | End: 2022-11-21

## 2022-11-21 RX ORDER — HYDROMORPHONE HYDROCHLORIDE 2 MG/ML
0.2 INJECTION, SOLUTION INTRAMUSCULAR; INTRAVENOUS; SUBCUTANEOUS
Status: DISCONTINUED | OUTPATIENT
Start: 2022-11-21 | End: 2022-11-21 | Stop reason: HOSPADM

## 2022-11-21 RX ORDER — SCOLOPAMINE TRANSDERMAL SYSTEM 1 MG/1
1 PATCH, EXTENDED RELEASE TRANSDERMAL
Status: DISCONTINUED | OUTPATIENT
Start: 2022-11-21 | End: 2022-11-21 | Stop reason: HOSPADM

## 2022-11-21 RX ORDER — OXYCODONE HYDROCHLORIDE 5 MG/1
5 TABLET ORAL EVERY 4 HOURS PRN
Status: DISCONTINUED | OUTPATIENT
Start: 2022-11-21 | End: 2022-11-21 | Stop reason: HOSPADM

## 2022-11-21 RX ORDER — MUPIROCIN 20 MG/G
OINTMENT TOPICAL
Status: DISCONTINUED | OUTPATIENT
Start: 2022-11-21 | End: 2022-11-21 | Stop reason: HOSPADM

## 2022-11-21 RX ORDER — LIDOCAINE HYDROCHLORIDE 10 MG/ML
1 INJECTION, SOLUTION EPIDURAL; INFILTRATION; INTRACAUDAL; PERINEURAL ONCE
Status: COMPLETED | OUTPATIENT
Start: 2022-11-21 | End: 2022-11-21

## 2022-11-21 RX ORDER — ROCURONIUM BROMIDE 10 MG/ML
INJECTION, SOLUTION INTRAVENOUS
Status: DISCONTINUED | OUTPATIENT
Start: 2022-11-21 | End: 2022-11-21

## 2022-11-21 RX ORDER — DOCUSATE SODIUM 100 MG/1
100 CAPSULE, LIQUID FILLED ORAL 2 TIMES DAILY
Qty: 60 CAPSULE | Refills: 3 | Status: SHIPPED | OUTPATIENT
Start: 2022-11-21 | End: 2024-02-08

## 2022-11-21 RX ORDER — ENOXAPARIN SODIUM 100 MG/ML
40 INJECTION SUBCUTANEOUS ONCE
Qty: 0.4 ML | Refills: 0 | Status: SHIPPED | OUTPATIENT
Start: 2022-11-21 | End: 2022-11-22

## 2022-11-21 RX ORDER — ACETAMINOPHEN 10 MG/ML
INJECTION, SOLUTION INTRAVENOUS
Status: DISCONTINUED | OUTPATIENT
Start: 2022-11-21 | End: 2022-11-21

## 2022-11-21 RX ORDER — IBUPROFEN 600 MG/1
600 TABLET ORAL EVERY 6 HOURS PRN
Qty: 60 TABLET | Refills: 1 | Status: SHIPPED | OUTPATIENT
Start: 2022-11-21 | End: 2023-01-05

## 2022-11-21 RX ORDER — ONDANSETRON 2 MG/ML
4 INJECTION INTRAMUSCULAR; INTRAVENOUS EVERY 4 HOURS PRN
Status: DISCONTINUED | OUTPATIENT
Start: 2022-11-21 | End: 2022-11-21 | Stop reason: HOSPADM

## 2022-11-21 RX ORDER — HYDROMORPHONE HYDROCHLORIDE 2 MG/ML
0.2 INJECTION, SOLUTION INTRAMUSCULAR; INTRAVENOUS; SUBCUTANEOUS EVERY 5 MIN PRN
Status: DISCONTINUED | OUTPATIENT
Start: 2022-11-21 | End: 2022-11-21 | Stop reason: HOSPADM

## 2022-11-21 RX ORDER — DEXAMETHASONE SODIUM PHOSPHATE 4 MG/ML
8 INJECTION, SOLUTION INTRA-ARTICULAR; INTRALESIONAL; INTRAMUSCULAR; INTRAVENOUS; SOFT TISSUE
Status: COMPLETED | OUTPATIENT
Start: 2022-11-21 | End: 2022-11-21

## 2022-11-21 RX ORDER — PROPOFOL 10 MG/ML
VIAL (ML) INTRAVENOUS
Status: DISCONTINUED | OUTPATIENT
Start: 2022-11-21 | End: 2022-11-21

## 2022-11-21 RX ORDER — SODIUM CHLORIDE, SODIUM LACTATE, POTASSIUM CHLORIDE, CALCIUM CHLORIDE 600; 310; 30; 20 MG/100ML; MG/100ML; MG/100ML; MG/100ML
INJECTION, SOLUTION INTRAVENOUS CONTINUOUS
Status: DISCONTINUED | OUTPATIENT
Start: 2022-11-21 | End: 2022-11-21 | Stop reason: HOSPADM

## 2022-11-21 RX ORDER — FUROSEMIDE 10 MG/ML
INJECTION INTRAMUSCULAR; INTRAVENOUS
Status: DISCONTINUED | OUTPATIENT
Start: 2022-11-21 | End: 2022-11-21

## 2022-11-21 RX ORDER — DEXAMETHASONE SODIUM PHOSPHATE 4 MG/ML
INJECTION, SOLUTION INTRA-ARTICULAR; INTRALESIONAL; INTRAMUSCULAR; INTRAVENOUS; SOFT TISSUE
Status: DISCONTINUED | OUTPATIENT
Start: 2022-11-21 | End: 2022-11-21

## 2022-11-21 RX ORDER — ACETAMINOPHEN 500 MG
1000 TABLET ORAL EVERY 8 HOURS PRN
Qty: 60 TABLET | Refills: 1 | Status: SHIPPED | OUTPATIENT
Start: 2022-11-21 | End: 2023-01-05

## 2022-11-21 RX ORDER — OXYCODONE HYDROCHLORIDE 10 MG/1
10 TABLET ORAL EVERY 4 HOURS PRN
Status: DISCONTINUED | OUTPATIENT
Start: 2022-11-21 | End: 2022-11-21 | Stop reason: HOSPADM

## 2022-11-21 RX ORDER — OXYCODONE HYDROCHLORIDE 5 MG/1
5 TABLET ORAL EVERY 4 HOURS PRN
Qty: 30 TABLET | Refills: 0 | Status: SHIPPED | OUTPATIENT
Start: 2022-11-21 | End: 2023-01-05

## 2022-11-21 RX ORDER — ENOXAPARIN SODIUM 100 MG/ML
40 INJECTION SUBCUTANEOUS ONCE
Status: COMPLETED | OUTPATIENT
Start: 2022-11-21 | End: 2022-11-21

## 2022-11-21 RX ORDER — CEFAZOLIN SODIUM 1 G/3ML
INJECTION, POWDER, FOR SOLUTION INTRAMUSCULAR; INTRAVENOUS
Status: DISCONTINUED | OUTPATIENT
Start: 2022-11-21 | End: 2022-11-21

## 2022-11-21 RX ORDER — BUPIVACAINE HYDROCHLORIDE 2.5 MG/ML
INJECTION, SOLUTION EPIDURAL; INFILTRATION; INTRACAUDAL
Status: DISCONTINUED | OUTPATIENT
Start: 2022-11-21 | End: 2022-11-21 | Stop reason: HOSPADM

## 2022-11-21 RX ORDER — ONDANSETRON 2 MG/ML
INJECTION INTRAMUSCULAR; INTRAVENOUS
Status: DISCONTINUED | OUTPATIENT
Start: 2022-11-21 | End: 2022-11-21

## 2022-11-21 RX ORDER — ONDANSETRON 4 MG/1
4 TABLET, ORALLY DISINTEGRATING ORAL EVERY 6 HOURS PRN
Qty: 30 TABLET | Refills: 0 | Status: SHIPPED | OUTPATIENT
Start: 2022-11-21 | End: 2023-01-05

## 2022-11-21 RX ORDER — EPHEDRINE SULFATE 50 MG/ML
INJECTION, SOLUTION INTRAVENOUS
Status: DISCONTINUED | OUTPATIENT
Start: 2022-11-21 | End: 2022-11-21

## 2022-11-21 RX ORDER — KETAMINE HCL IN 0.9 % NACL 50 MG/5 ML
SYRINGE (ML) INTRAVENOUS
Status: DISCONTINUED | OUTPATIENT
Start: 2022-11-21 | End: 2022-11-21

## 2022-11-21 RX ORDER — OXYCODONE HYDROCHLORIDE 5 MG/1
5 TABLET ORAL
Status: DISCONTINUED | OUTPATIENT
Start: 2022-11-21 | End: 2022-11-21 | Stop reason: HOSPADM

## 2022-11-21 RX ADMIN — PROMETHAZINE HYDROCHLORIDE 6.25 MG: 25 INJECTION INTRAMUSCULAR; INTRAVENOUS at 01:11

## 2022-11-21 RX ADMIN — ROCURONIUM BROMIDE 5 MG: 10 INJECTION, SOLUTION INTRAVENOUS at 07:11

## 2022-11-21 RX ADMIN — FUROSEMIDE 5 MG: 10 INJECTION, SOLUTION INTRAMUSCULAR; INTRAVENOUS at 12:11

## 2022-11-21 RX ADMIN — FENTANYL CITRATE 50 MCG: 50 INJECTION, SOLUTION INTRAMUSCULAR; INTRAVENOUS at 12:11

## 2022-11-21 RX ADMIN — MIDAZOLAM HYDROCHLORIDE 2 MG: 1 INJECTION, SOLUTION INTRAMUSCULAR; INTRAVENOUS at 07:11

## 2022-11-21 RX ADMIN — ONDANSETRON 8 MG: 8 TABLET, ORALLY DISINTEGRATING ORAL at 03:11

## 2022-11-21 RX ADMIN — SUGAMMADEX 200 MG: 100 INJECTION, SOLUTION INTRAVENOUS at 12:11

## 2022-11-21 RX ADMIN — LIDOCAINE HYDROCHLORIDE 10 MG: 10 INJECTION, SOLUTION EPIDURAL; INFILTRATION; INTRACAUDAL; PERINEURAL at 06:11

## 2022-11-21 RX ADMIN — PROPOFOL 150 MG: 10 INJECTION, EMULSION INTRAVENOUS at 07:11

## 2022-11-21 RX ADMIN — DEXAMETHASONE SODIUM PHOSPHATE 8 MG: 4 INJECTION, SOLUTION INTRAMUSCULAR; INTRAVENOUS at 07:11

## 2022-11-21 RX ADMIN — SODIUM CHLORIDE, SODIUM LACTATE, POTASSIUM CHLORIDE, AND CALCIUM CHLORIDE: .6; .31; .03; .02 INJECTION, SOLUTION INTRAVENOUS at 09:11

## 2022-11-21 RX ADMIN — SODIUM CHLORIDE, SODIUM LACTATE, POTASSIUM CHLORIDE, AND CALCIUM CHLORIDE: .6; .31; .03; .02 INJECTION, SOLUTION INTRAVENOUS at 06:11

## 2022-11-21 RX ADMIN — ROCURONIUM BROMIDE 45 MG: 10 INJECTION, SOLUTION INTRAVENOUS at 07:11

## 2022-11-21 RX ADMIN — FENTANYL CITRATE 25 MCG: 0.05 INJECTION, SOLUTION INTRAMUSCULAR; INTRAVENOUS at 01:11

## 2022-11-21 RX ADMIN — ONDANSETRON 4 MG: 2 INJECTION, SOLUTION INTRAMUSCULAR; INTRAVENOUS at 12:11

## 2022-11-21 RX ADMIN — ROCURONIUM BROMIDE 10 MG: 10 INJECTION, SOLUTION INTRAVENOUS at 12:11

## 2022-11-21 RX ADMIN — SCOPALAMINE 1 PATCH: 1 PATCH, EXTENDED RELEASE TRANSDERMAL at 06:11

## 2022-11-21 RX ADMIN — GLYCOPYRROLATE 0.2 MG: 0.2 INJECTION, SOLUTION INTRAMUSCULAR; INTRAVITREAL at 08:11

## 2022-11-21 RX ADMIN — ROCURONIUM BROMIDE 10 MG: 10 INJECTION, SOLUTION INTRAVENOUS at 11:11

## 2022-11-21 RX ADMIN — FENTANYL CITRATE 50 MCG: 50 INJECTION, SOLUTION INTRAMUSCULAR; INTRAVENOUS at 07:11

## 2022-11-21 RX ADMIN — LIDOCAINE HYDROCHLORIDE 100 MG: 20 INJECTION INTRAVENOUS at 07:11

## 2022-11-21 RX ADMIN — OXYCODONE 5 MG: 5 TABLET ORAL at 04:11

## 2022-11-21 RX ADMIN — CEFAZOLIN 2 G: 330 INJECTION, POWDER, FOR SOLUTION INTRAMUSCULAR; INTRAVENOUS at 07:11

## 2022-11-21 RX ADMIN — ACETAMINOPHEN 1000 MG: 10 INJECTION, SOLUTION INTRAVENOUS at 07:11

## 2022-11-21 RX ADMIN — KETOROLAC TROMETHAMINE 30 MG: 30 INJECTION, SOLUTION INTRAMUSCULAR at 12:11

## 2022-11-21 RX ADMIN — ROCURONIUM BROMIDE 10 MG: 10 INJECTION, SOLUTION INTRAVENOUS at 09:11

## 2022-11-21 RX ADMIN — Medication 20 MG: at 07:11

## 2022-11-21 RX ADMIN — DEXAMETHASONE SODIUM PHOSPHATE 8 MG: 4 INJECTION INTRA-ARTICULAR; INTRALESIONAL; INTRAMUSCULAR; INTRAVENOUS; SOFT TISSUE at 06:11

## 2022-11-21 RX ADMIN — ENOXAPARIN SODIUM 40 MG: 100 INJECTION SUBCUTANEOUS at 03:11

## 2022-11-21 RX ADMIN — EPHEDRINE SULFATE 10 MG: 50 INJECTION INTRAVENOUS at 07:11

## 2022-11-21 RX ADMIN — FENTANYL CITRATE 50 MCG: 50 INJECTION, SOLUTION INTRAMUSCULAR; INTRAVENOUS at 08:11

## 2022-11-21 RX ADMIN — ROCURONIUM BROMIDE 20 MG: 10 INJECTION, SOLUTION INTRAVENOUS at 09:11

## 2022-11-21 RX ADMIN — MUPIROCIN: 20 OINTMENT TOPICAL at 06:11

## 2022-11-21 RX ADMIN — ROCURONIUM BROMIDE 20 MG: 10 INJECTION, SOLUTION INTRAVENOUS at 08:11

## 2022-11-21 RX ADMIN — SODIUM CHLORIDE, SODIUM LACTATE, POTASSIUM CHLORIDE, AND CALCIUM CHLORIDE: .6; .31; .03; .02 INJECTION, SOLUTION INTRAVENOUS at 07:11

## 2022-11-21 NOTE — INTERVAL H&P NOTE
The patient has been examined and the H&P has been reviewed:    I concur with the findings and no changes have occurred since H&P was written.    Surgery risks, benefits and alternative options discussed and understood by patient. Consents for surgery/blood transfusion previously discussed and signed in clinic, confirmed at bedside this AM. All questions answered. Will proceed to OR for scheduled TLH/RR-BSO/Possible USLS/MUS/Cystoscopy/Possible A&P repair.      Active Hospital Problems    Diagnosis  POA    *Urinary incontinence, mixed [N39.46]  Yes      Resolved Hospital Problems   No resolved problems to display.       Jami Vargas M.D.  OB/GYN PGY-4

## 2022-11-21 NOTE — PROGRESS NOTES
Pt voiding spontaneously. Good appetite, tolerating PO food and liquids well. No further questions.

## 2022-11-21 NOTE — ANESTHESIA PREPROCEDURE EVALUATION
11/21/2022  Corinne Morrison Marcus is a 57 y.o., female.      Pre-op Assessment    I have reviewed the Patient Summary Reports.          Review of Systems  Anesthesia Hx:  No problems with previous Anesthesia    Social:  Non-Smoker    Cardiovascular:   hyperlipidemia    Pulmonary:  Pulmonary Normal    Psych:   anxiety depression          Physical Exam  General: Well nourished    Airway:  Mallampati: II   Mouth Opening: Normal  TM Distance: Normal  Tongue: Normal  Neck ROM: Normal ROM    Dental:  Intact        Anesthesia Plan  Type of Anesthesia, risks & benefits discussed:    Anesthesia Type: Gen ETT  Intra-op Monitoring Plan: Standard ASA Monitors  Post Op Pain Control Plan: multimodal analgesia and IV/PO Opioids PRN  Induction:  IV  Airway Plan: Direct, Post-Induction  Informed Consent: Informed consent signed with the Patient and all parties understand the risks and agree with anesthesia plan.  All questions answered.   ASA Score: 2  Day of Surgery Review of History & Physical: H&P Update referred to the surgeon/provider.    Ready For Surgery From Anesthesia Perspective.     .

## 2022-11-21 NOTE — PLAN OF CARE
Vital signs stable. Discharge instructions reviewed with patient and mother. Questions answered. Verbalized understanding.

## 2022-11-21 NOTE — ANESTHESIA PROCEDURE NOTES
Intubation    Date/Time: 11/21/2022 7:21 AM  Performed by: Evangelina Ventura CRNA  Authorized by: Kristen Pineda MD     Intubation:     Induction:  Intravenous    Intubated:  Postinduction    Mask Ventilation:  Easy mask    Attempts:  1    Attempted By:  CRNA    Method of Intubation:  Direct    Blade:  Zaldivar 3    Laryngeal View Grade: Grade I - full view of cords      Difficult Airway Encountered?: No      Complications:  None    Airway Device:  Oral endotracheal tube    Airway Device Size:  7.0    Secured at:  The lips    Placement Verified By:  Capnometry    Complicating Factors:  None    Findings Post-Intubation:  Atraumatic/condition of teeth unchanged and BS equal bilateral

## 2022-11-21 NOTE — DISCHARGE SUMMARY
Discharge Summary  Urogynecology      Admit Date: 11/21/2022    Discharge Date: 11/21/2022     Attending Physician: Sheree Holden MD    Principal Diagnoses: Urinary incontinence, mixed    Active Hospital Problems    Diagnosis  POA    *Urinary incontinence, mixed [N39.46]  Yes    S/p TLH/BSO/USLS/MUS/Cystoscopy 11/21/22 [Z90.710]  Yes      Resolved Hospital Problems   No resolved problems to display.       Procedures: Procedure(s) (LRB):  HYSTERECTOMY, TOTAL, LAPAROSCOPIC (N/A)  SALPINGO-OOPHORECTOMY, BILATERAL (N/A)  UTEROSACRAL LIGAMENT SUSPENSION (N/A)  SLING, MIDURETHRAL (N/A)  CYSTOSCOPY (N/A)    Discharged Condition: good    Hospital Course:   Patient presented for scheduled procedure. Patient was passed back to OR for TLH/BSO/USLS/MUS/Cystoscopy. Please see operative report for further details. The patient tolerated procedure well and patient was taken to recovery in a stable condition.    Prior to discharge patient was able to void (passed active void trial), ambulate, tolerate p.o. and pain was well controlled with p.o. medications. Patient was given routine post-operative instructions for which patient voiced understanding. Patient was subsequently discharged home with plans to follow up with Dr. Holden for post-operative care.    Consults: None    Treatments:   1. Surgery as above    Disposition: Home or Self Care    Patient Instructions:   Current Discharge Medication List        START taking these medications    Details   acetaminophen (TYLENOL) 500 MG tablet Take 2 tablets (1,000 mg total) by mouth every 8 (eight) hours as needed for Pain.  Qty: 60 tablet, Refills: 1      docusate sodium (COLACE) 100 MG capsule Take 1 capsule (100 mg total) by mouth 2 (two) times daily.  Qty: 60 capsule, Refills: 3      enoxaparin (LOVENOX) 40 mg/0.4 mL Syrg Inject 0.4 mLs (40 mg total) into the skin once. for 1 dose  Qty: 0.4 mL, Refills: 0      ibuprofen (ADVIL,MOTRIN) 600 MG tablet Take 1 tablet (600 mg total) by  mouth every 6 (six) hours as needed for Pain.  Qty: 60 tablet, Refills: 1      !! ondansetron (ZOFRAN-ODT) 4 MG TbDL Take 1 tablet (4 mg total) by mouth every 6 (six) hours as needed.  Qty: 30 tablet, Refills: 0      oxyCODONE (ROXICODONE) 5 MG immediate release tablet Take 1 tablet (5 mg total) by mouth every 4 (four) hours as needed for Pain.  Qty: 30 tablet, Refills: 0    Comments: Quantity prescribed more than 7 day supply? No       !! - Potential duplicate medications found. Please discuss with provider.        CONTINUE these medications which have NOT CHANGED    Details   atorvastatin (LIPITOR) 40 MG tablet Take 1 tablet (40 mg total) by mouth once daily.  Qty: 90 tablet, Refills: 3    Associated Diagnoses: Hypercholesterolemia      cetirizine (ZYRTEC) 5 MG tablet Take 5 mg by mouth once daily.      letrozole (FEMARA) 2.5 mg Tab Take 1 tablet (2.5 mg total) by mouth once daily.  Qty: 30 tablet, Refills: 2    Associated Diagnoses: Malignant neoplasm of right breast in female, estrogen receptor positive, unspecified site of breast      methen-m.blue-s.phos-phsal-hyo (URIBEL) 118-10-40.8-36 mg Cap Take 1 capsule by mouth 4 (four) times daily as needed (bladder irritability).  Qty: 30 capsule, Refills: 11    Associated Diagnoses: Urinary urgency      sertraline (ZOLOFT) 100 MG tablet Take 1 tablet (100 mg total) by mouth once daily.  Qty: 90 tablet, Refills: 3    Associated Diagnoses: Depression, unspecified depression type      spironolactone (ALDACTONE) 100 MG tablet Take 100 mg by mouth every morning.      traZODone (DESYREL) 50 MG tablet Take 1 tablet (50 mg total) by mouth every evening.  Qty: 30 tablet, Refills: 3    Associated Diagnoses: Insomnia, unspecified type      fluticasone (FLONASE) 50 mcg/actuation nasal spray 1 spray by Each Nare route once daily.      !! ondansetron (ZOFRAN-ODT) 8 MG TbDL Take 1 tablet (8 mg total) by mouth every 6 (six) hours as needed (nausea).  Qty: 15 tablet, Refills: 1     Associated Diagnoses: Nausea       !! - Potential duplicate medications found. Please discuss with provider.          Discharge Procedure Orders   Diet general     Lifting restrictions   Order Comments: No lifting greater than 15 pounds for six weeks.     Remove dressing in 24 hours   Order Comments: If you have a bandage on wound, you may remove it the day after dismissal.  If you had steri-strips remove them once they begin to peel off (usually 2 weeks). Keep incision clean and dry.  Inspect the incision daily for signs and symptoms of infection.     Wound care routine (specify)   Order Comments: WOUND CARE:  If you have a band-aid or bandage on your wound, you may remove it the day after dismissal.  If you had steri-strips remove them once they begin to peel off (usually 2 weeks).  If your steri-strips still haven't come off in 2 weeks, please remove them. You may wash the wound with mild soap and water.   You may shower at any time but should avoid immersing any abdominal incisions in water for at least two weeks after surgery or until the wound is completely healed. If given, please shower with Hibiclens soap until bottle is completely finished. Keep your wound clean and dry.  You should observe your incision for signs of infection which include redness, warmth, drainage or fever.     Call MD for:  temperature >100.4     Call MD for:  persistent nausea and vomiting     Call MD for:  severe uncontrolled pain     Call MD for:  difficulty breathing, headache or visual disturbances     Call MD for:  redness, tenderness, or signs of infection (pain, swelling, redness, odor or green/yellow discharge around incision site)     Call MD for:  hives     Call MD for:   Order Comments: Inability to void,urine is ketchup colored or you have large clots, vaginal bleeding is heavier than a period.    VAGINAL DISCHARGE: You may develop a vaginal discharge and intermittent vaginal spotting after surgery and up to 6 weeks  postoperatively.  The discharge may have an odor and may change in color but it is normal.  This is due to dissolving stiches.  Contact your surgical team if you develop vaginal or vulvar irritation along with a discharge.  Also contact your surgical team if you have vaginal discharge that smells like urine or stool.    CONSTIPATION REMEDIES: Patients are often constipated after surgery or with use of oral narcotic medicine. You should continue to take the stool softener Colace during the next six weeks, and consume adequate amounts of water.  If you have not had a bowel movement for 3 days after dismissal, or are uncomfortable and unable to pass stool, please try one or all of the following measures:  1.  Milk of Magnesia - 30 cc by mouth every 12 hours   2.  Dulcolax suppository - One suppository per rectum every 4-6 hours   3.  Metamucil, Fibercon or other bulk former - use as directed  4.  Fleets Enema    PAIN MEDICATIONS:   Take your pain medications as instructed. It is best to take pain medications before your pain becomes severe. This will allow you to take less medication yet have better pain relief. For the first 2 or 3 days it may be helpful to take your pain medications on a regular schedule (e.g. every 4 to 6 hours). This will help you to keep your pain under better control. You should then begin to take fewer medications each day until you no longer need them. Do not take pain medication on an empty stomach. This may lead to nausea and vomiting.     Call MD for:  persistent dizziness or light-headedness     Call MD for:  extreme fatigue     Activity as tolerated   Order Comments: Return to normal activity slowly as you feel able. For 6 weeks your exercise should be limited to walking.  You may walk as far as you wish, as long as you increase your level of exertion gradually and avoid slippery surfaces.    If you had a hysterectomy at the surgery do not insert anything in your vagina for 8 weeks or  until cleared by Dr. Holden.     Shower on day dressing removed (No bath)   Order Comments: You may shower at any time but should avoid immersing any abdominal incisions in water for at least 2 weeks after surgery or until the wound is completely healed.  If given, please shower with Hibaclens soap until bottle is completely finished.        Follow-up Information       Sheree Holden MD. Schedule an appointment as soon as possible for a visit in 6 week(s).    Specialty: UroGynecology   Why: Post-operative Visit  Contact information:  [address] [place] [id number]  [phone]                           Jami Vargas M.D.  OB/GYN PGY-4

## 2022-11-21 NOTE — OP NOTE
Date of Operation: 11/21/2022    Title of Operation:  1)  Total laparoscopic hysterectomy with bilateral salpingo-oophorectomy  2)  Laparoscopic bilateral uterosacral vaginal vault suspension  3)  Placement of retropubic tension-free midurethral sling, Advantage Fit ([employer])  4)  Cystourethroscopy    Indications for Surgery:  1)  UI:  + YELITZA (tennis, cough, sneeze) = (+) UUI (standing/spontaneous, walking)  X 14years.  (+) pads:2/day, usually severe wetness (should use more/day) and 0/night, does not endorse usual pads at night.  Daytime frequency: Q 1 hours.  Nocturia: No: 1/night.   (--) dysuria,  (--) hematuria,  (--) frequent UTIs, used to have frequent UTIs 8-10 yrs ago. + complete bladder emptying.   --was Rx'd oxybutynin but never tried  --has tried Kegels--didn't help  +YELITZA on UDS 11-3-2022     2)  POP:  Absent.  (--) vaginal bleeding. (--) vaginal discharge. (--) sexually active.  (--) dyspareunia. (--)  Vaginal dryness.  (--) vaginal estrogen use.   --POP-Q:  Aa -1; Ba -1; C -5; Ap -1; Bp -1; D -7.  Genital hiatus 3, perineal body 2, total vaginal length 10-11.     3)  BM:  (--) constipation/straining. Take 4 senekot a night.  (--) chronic diarrhea. (--) hematochezia.  (--) fecal incontinence.  (--) fecal smearing/urgency.  (--) complete evacuation.   --sees Dr. Solorzano      4)  Desires hysterectomy:  --for risk reduction with h/o breast CA  8/2022 , estradiol 17  --discussed with Ned/Freddie and will proceed with BSO  --pelvic US 10/2022 normal    Preoperative Diagnosis:  1)  Mixed urinary incontinence  2)  Stage 2 cystocele  3)  Stage 2 rectocele  4)  History of estrogen-sensitive breast cancer  5)  Concern for voiding dysfunction (Valsalva assist)  6)  Urodynamic stress incontinence    Postoperative Diagnosis:  1)  Mixed urinary incontinence  2)  Stage 2 cystocele  3)  Stage 2 rectocele  4)  History of estrogen-sensitive breast cancer  5)  Concern for voiding dysfunction (Valsalva  assist)  6)  Urodynamic stress incontinence    Anesthesia:  General endotracheal anesthesia.  Additionally, 0.25% marcaine was injected prior to placement of laparoscopic trocars and before vaginal portions of the procedure for local anesthesia.    Specimen (Bacteriological, Pathological or other):  Uterus and cervix, bilateral fallopian tubes and ovaries    Prosthetic Device/Implant:  1)  Advantage Fit sling.  LOT# [id number].      Surgeons Narrative:    Surgeon: Sheree Holden MD    Assistants:  Jami Vargas MD ([id number]).       Intravenous Fluids:  2000 mL     Estimated Blood Loss:  150 mL     Urine Output:  900 mL     Counts:  Sponge, lap, needle counts correct x 2.     Drains: Hernandez catheter.     Disposition:  The patient was sent to the PACU in stable condition.     Findings:     1.  On exam under anesthesia,  normal external female genitalia. There was prolapse as previously noted in clinic.  Uterus and cervix: Normal size  Adnexa: normal bimanual exam    2.  On laparoscopic survey:    --The bowel was grossly Normal.    --The appendix was not visualized.   --The uterus and cervix were present and Normal.   --The liver margin Normal.   --The gall bladder was present and Normal.   --Bilateral ureters were visualized and noted to vermiculate at the start and close of the case.    --Adhesions were absent.    --Other findings included:  none     3.  On cystoscopy, the bladder mucosa was Normal.  After TLH/BSO/USS/sling, there was no suture or mesh within the bladder mucosa.  The ureteral orifices were visualized bilaterally with (+) noted good efflux x 2. On a systematic survey of the bladder dome to the base of the urethrovesical junction, there were not other abnormalities noted. The urethra was normal on retraction of the scope.     4.  Rectal exam:  At the close of the case, rectal exam was performed.  There was no suture, mesh, or other injury noted on exam.  No obvious masses were palpated.     Description of  procedure:    The patient was identified in the preoperative area where informed consent was confirmed, and she was taken to the operating room where an adequate level of general anesthesia was obtained.  The patient was positioned in lithotomy position with legs in Sandeep stirrups. Care was taken to avoid joint hyperflexion or hyperextension, and all extremity surfaces were carefully padded so as to minimize risk of neurologic injury. Intravenous antibiotics were administered preoperatively. Sequential compression devices were applied to the patient's lower extremities preoperatively and heparin was administered subcutaneously for VTE prophylaxis.  Surgical time-out was performed, where the patient was identified and procedures confirmed.  An examination under anesthesia was performed with findings described as above.  The patient's abdomen, perineum, and vagina were sterilely prepped and draped. A angulo catheter was placed in the bladder for drainage.      A weighted speculum was placed in the vagina.  The cervix was identified, the posterior lip was grasped with a single toothed tenaculum, and stay sutures of 0-vicryl were placed in the anterior and posterior cervix.  The uterus was sounded, and the appropriate uterine manipulator and KOH colpotomizer were selected.  The CHARISSE/KOH apparatus was assembled, and the uterine manipulator was advanced into the uterine cavity until the KOH colpotomizer was secured optimally around the cervix.  The uterine manipulator was inflated to secure the device, and a vaginal occluder balloon was placed distally to the CHARISSE/KOH and inflated until the vaginal cavity was occluded.      Attention was turned abdominally, where after injection of Marcaine locally, an incision was made at the umbilicus, through which a 10 mm Xcel trocar and sleeve were placed with laparoscopic visualization. Proper intraperitoneal placement was confirmed with a pneumoperitoneum of carbon dioxide gas  obtained to a pressure of no greater than 15 mmHg. After injection of the skin with Marcaine locally, an incision was made at the left lower quadrant, 2 fingerbreadths cephalad and 2 fingerbreadths medial to the anterior-superior iliac spine, through which a 10 mm laparoscopic trocar and sleeve were advanced under direct visualization with the laparoscope. This procedure was repeated on the patient's right lower quadrant. Care was taken to stay medial of the inferior epigastric vessels.  Finally, a 5 mm laparoscopic trocar and sleeve were advanced to the left abdominal wall along the midclavicular line at approximately the level of the umbilicus, 1 hand width cephalad to the 10 mm trocar. All trocars were          placed without complication.  The patient was placed in Trendelenburg and the bowel was displaced gently from the pelvis. Careful laparoscopic survey of the pelvis and abdomenrevealed findings as detailed above. The right ureter was visualized retroperitoneally and noted to vermiculate.     We began the case with total laparoscopic hysterectomy.  The ureters were visualized bilaterally.  Decision had previously been made to remove bilateral tubes and ovaries.  Sequentially, the right infundibulopelvic, round, and broad ligaments were electrodessicated with bipolar cautery.  The anterior and posterior sheaths of the round ligament were gently , the uterine vessels were exposed, and electrodessicated.  Using sharp dissection, the vesicouterine fold was created, and the bladder was carefully dissected off the anterior vagina.   Next, these same steps were repeated along the patient's left side.  Excellent hemostasis was noted.  Using the monopolar dharmesh, the anterior colpotomy was created along the KOH device and considered circumferentially until the entire uterine specimen was freed from the pelvis.  The specimen was then handed to pathology for further evaluation.  The vaginal cuff was closed  with several figure-of-eight stitches of 0-vicryl.  Excellent hemostasis was noted.     Next, we proceeded with laparoscopic uterosacral suspension.  Bilateral uterosacral ligaments were previously tagged with 0-vicryl sutures bilaterally while the uterus was in place.  These tags were identified.  Bilateral ureters were re-identified, and care was taken to avoid damage to them during this process.  A 0-Prolene suture was placed through the right uterosacral ligament at the level of the ischial spine and then passed through the posterior and anterior vaginal cuff angle, making sure not to pass the suture through full thickness of the vaginal epithelium.  Next, a 0-PDS suture was placed through the right uterosacral ligament about 1 cm distal and medial to the prolene stitch and passed completely through the posterior and anterior vaginal cuff at the right cuff angle.  Both sutures were tied down, resulting in excellent elevation of the right vaginal apex.  The same procedure was performed on the patient's left side, passing a 0-Prolene stitch more cephalad and lateral to the 0-PDS stitch.  Both sutures were tied down, resulting in excellent elevation of the right vaginal apex.   At this point, bilateral ureters were noted to vermiculate well.  The vaginal cuff was check manually and noted to be intact, without defect, and well-elevated at the apex after the uterosacral suspension.  The laparoscopic instruments were placed on standby, and cystourethroscopy was performed.  There were no abnormalities noted, and excellent efflux from bilateral ureteral orifices was seen after intravenous injection of intravenous indigo carmine.      At this point, laparoscopic portion of the procedure was completed. The pelvis was irrigated, and all irrigants were removed. The abdominal wall fascia was closed at the 10 mm laparoscopic port sites with an Endoclose suture carrier with stitches of 0 Vicryl. This was done under direct  laparoscopic visualization. The abdomen was deflated and all laparoscopic sleeves and other instruments were removed from the abdomen. All laparoscopic skin incisions were closed with subcuticular stitches of 4-0 Monocryl and sealed with Dermabond.  Excellent cosmesis and hemostasis was noted.             We then proceeded with placement of the Advantage Fit midurethral sling. The skin was marked just above the symphysis pubis, 2 cm laterally on either side of the midline indicating the exit sites for the trocars.  The Hernandez catheter was palpated at the urethrovesical junction, and 2 Allis clamps were placed at the anterior vaginal wall along the midurethra. The Hernandez catheter was removed from the patient's bladder. The vaginal epithelium between the two Allis clamps was injected with the 1% lidocaine with epinephrine.  Metzenbaum scissors were used to dissect the vaginal epithelium off the underlying pubocervical muscular tissue in the direction of the angle formed between the ischiopubic ramus and the pubic bone bilaterally. The rigid catheter guide was used to deviate the bladder neck and urethra to the patient's left while the right trocar was advanced to the dissected tract in the patient's right side.  Once the urogenital membrane was perforated, the trocar and handle were reoriented in the sagittal plane and the tip of the trocar was advanced through the retropubic space in intimate proximity to the pubic bone.  The trocar was then advanced through the skin approximately 2 cm to the right of the midline just above the pubic symphysis. The passage of the Advantage Fit trocar was repeated on the patient's left hand side in a similar fashion, using the catheter guide to deviate the bladder neck to the right.  At this point, cystourethroscopy was performed. Cystoscopy was negative for injury after infusion of at least 300 mL in the bladder.  The ureteral orifices were noted to have good efflux bilaterally.  The  bladder was then drained. With a #10 Hegar dilator placed between the sling mesh and the urethra, the arms of the sling were elevated above the pubic symphysis and the plastic sheaths were removed from the mesh arms.  Excess mesh was trimmed beneath the suprapubic skin.  The Hegar dilator ensured that the mesh sling was placed in a tension-free manner.  The vaginal mucosa overlying the sling mesh was closed with a several mattress stitches of 2-0 Vicryl with excellent hemostasis noted.  The suprapubic incisions were closed with dermabond.    After this, vaginal exam revealed excellent support of all vaginal walls, and no further repairs were needed.  Rectal exam was normal as above noted.       At the close of the case, all counts were correct x2.  The vagina was irrigated, and all irrigants were removed.  The vagina was packed with a role of Kerlix, coated with Premarin cream for assurance of immediate postop hemostasis.  The Hernandez was in place and draining well.  The patient was awakened from general endotracheal anesthesia and was taken to the Recovery Room in stable condition.  She tolerated the procedure well.    I was present and scrubbed for the entire procedure.

## 2022-11-21 NOTE — PROGRESS NOTES
Pt voided 300 mL after voiding trial. MD aware. Vital signs stable. Discharge instructions reviewed with patient and mother. Questions answered. Verbalized understanding.

## 2022-11-21 NOTE — TRANSFER OF CARE
"Anesthesia Transfer of Care Note    Patient: Corinne Morrison Marcus    Procedure(s) Performed: Procedure(s) (LRB):  HYSTERECTOMY, TOTAL, LAPAROSCOPIC (N/A)  SALPINGO-OOPHORECTOMY, BILATERAL (N/A)  UTEROSACRAL LIGAMENT SUSPENSION (N/A)  SLING, MIDURETHRAL (N/A)  CYSTOSCOPY (N/A)    Patient location: PACU    Anesthesia Type: general    Transport from OR: Transported from OR on room air with adequate spontaneous ventilation    Post pain: adequate analgesia    Post assessment: no apparent anesthetic complications and tolerated procedure well    Post vital signs: stable    Level of consciousness: responds to stimulation    Nausea/Vomiting: no nausea/vomiting    Complications: none    Transfer of care protocol was followed      Last vitals:   Visit Vitals  BP (!) 112/59 (BP Location: Left arm, Patient Position: Lying)   Pulse 75   Temp 36.1 °C (97 °F)   Resp 12   Ht 5' 4" (1.626 m)   Wt 65.3 kg (144 lb)   LMP 03/18/2019   SpO2 99%   Breastfeeding No   BMI 24.72 kg/m²     "

## 2022-11-22 VITALS
HEART RATE: 79 BPM | HEIGHT: 64 IN | TEMPERATURE: 97 F | SYSTOLIC BLOOD PRESSURE: 117 MMHG | DIASTOLIC BLOOD PRESSURE: 60 MMHG | BODY MASS INDEX: 24.59 KG/M2 | RESPIRATION RATE: 18 BRPM | OXYGEN SATURATION: 98 % | WEIGHT: 144 LBS

## 2022-11-28 NOTE — ANESTHESIA POSTPROCEDURE EVALUATION
Anesthesia Post Evaluation    Patient: Corinne Morrison Marcus    Procedure(s) Performed: Procedure(s) (LRB):  HYSTERECTOMY, TOTAL, LAPAROSCOPIC (N/A)  SALPINGO-OOPHORECTOMY, BILATERAL (N/A)  UTEROSACRAL LIGAMENT SUSPENSION (N/A)  SLING, MIDURETHRAL (N/A)  CYSTOSCOPY (N/A)    Final Anesthesia Type: general      Patient location during evaluation: PACU  Patient participation: Yes- Able to Participate  Level of consciousness: awake and alert  Post-procedure vital signs: reviewed and stable  Pain management: adequate  Airway patency: patent    PONV status at discharge: No PONV  Anesthetic complications: no      Cardiovascular status: blood pressure returned to baseline  Respiratory status: unassisted and room air  Hydration status: euvolemic  Follow-up not needed.          Vitals Value Taken Time   /60 11/21/22 1445   Temp 36.1 °C (97 °F) 11/21/22 1315   Pulse 79 11/21/22 1445   Resp 18 11/21/22 1628   SpO2 98 % 11/21/22 1445         Event Time   Out of Recovery 15:00:00         Pain/Boaz Score: No data recorded

## 2022-11-30 LAB
FINAL PATHOLOGIC DIAGNOSIS: NORMAL
GROSS: NORMAL
Lab: NORMAL

## 2022-12-01 ENCOUNTER — TELEPHONE (OUTPATIENT)
Dept: UROGYNECOLOGY | Facility: CLINIC | Age: 57
End: 2022-12-01
Payer: COMMERCIAL

## 2022-12-01 NOTE — TELEPHONE ENCOUNTER
----- Message from Sheree Holden MD sent at 11/30/2022 10:39 PM CST -----  Please let patient know that all her pathology was benign. Good news!  How is she doing postop--please let me know. Thanks!

## 2022-12-04 ENCOUNTER — PATIENT MESSAGE (OUTPATIENT)
Dept: UROGYNECOLOGY | Facility: CLINIC | Age: 57
End: 2022-12-04
Payer: COMMERCIAL

## 2022-12-05 ENCOUNTER — HOSPITAL ENCOUNTER (OUTPATIENT)
Dept: RADIOLOGY | Facility: HOSPITAL | Age: 57
Discharge: HOME | End: 2022-12-05
Attending: INTERNAL MEDICINE
Payer: COMMERCIAL

## 2022-12-05 ENCOUNTER — PATIENT MESSAGE (OUTPATIENT)
Dept: UROGYNECOLOGY | Facility: CLINIC | Age: 57
End: 2022-12-05
Payer: COMMERCIAL

## 2022-12-05 DIAGNOSIS — C50.911 MALIGNANT NEOPLASM OF RIGHT BREAST IN FEMALE, ESTROGEN RECEPTOR POSITIVE, UNSPECIFIED SITE OF BREAST: ICD-10-CM

## 2022-12-05 DIAGNOSIS — Z17.0 MALIGNANT NEOPLASM OF RIGHT BREAST IN FEMALE, ESTROGEN RECEPTOR POSITIVE, UNSPECIFIED SITE OF BREAST: ICD-10-CM

## 2022-12-05 LAB — POCT GLUCOSE: 113 MG/DL (ref 70–110)

## 2022-12-05 PROCEDURE — 25500020 PHARM REV CODE 255: Performed by: INTERNAL MEDICINE

## 2022-12-05 PROCEDURE — A9552 F18 FDG: HCPCS

## 2022-12-05 PROCEDURE — A9698 NON-RAD CONTRAST MATERIALNOC: HCPCS | Performed by: INTERNAL MEDICINE

## 2022-12-05 PROCEDURE — 78815 PET IMAGE W/CT SKULL-THIGH: CPT | Mod: TC

## 2022-12-05 PROCEDURE — 78815 PET IMAGE W/CT SKULL-THIGH: CPT | Mod: 26,PS,, | Performed by: RADIOLOGY

## 2022-12-05 PROCEDURE — 78815 NM PET CT ROUTINE: ICD-10-PCS | Mod: 26,PS,, | Performed by: RADIOLOGY

## 2022-12-05 RX ADMIN — BARIUM SULFATE 900 ML: 20 SUSPENSION ORAL at 08:12

## 2022-12-13 ENCOUNTER — TELEPHONE (OUTPATIENT)
Dept: OBSTETRICS AND GYNECOLOGY | Facility: CLINIC | Age: 57
End: 2022-12-13
Payer: COMMERCIAL

## 2022-12-13 ENCOUNTER — OFFICE VISIT (OUTPATIENT)
Dept: HEMATOLOGY/ONCOLOGY | Facility: CLINIC | Age: 57
End: 2022-12-13
Payer: COMMERCIAL

## 2022-12-13 VITALS
HEIGHT: 64 IN | DIASTOLIC BLOOD PRESSURE: 81 MMHG | TEMPERATURE: 97 F | BODY MASS INDEX: 24.24 KG/M2 | WEIGHT: 142 LBS | RESPIRATION RATE: 17 BRPM | HEART RATE: 82 BPM | OXYGEN SATURATION: 100 % | SYSTOLIC BLOOD PRESSURE: 165 MMHG

## 2022-12-13 DIAGNOSIS — C77.3 BREAST CANCER METASTASIZED TO AXILLARY LYMPH NODE, RIGHT: Primary | ICD-10-CM

## 2022-12-13 DIAGNOSIS — C50.911 BREAST CANCER METASTASIZED TO AXILLARY LYMPH NODE, RIGHT: Primary | ICD-10-CM

## 2022-12-13 PROCEDURE — 99215 OFFICE O/P EST HI 40 MIN: CPT | Mod: S$GLB,,, | Performed by: INTERNAL MEDICINE

## 2022-12-13 PROCEDURE — 3079F DIAST BP 80-89 MM HG: CPT | Mod: CPTII,S$GLB,, | Performed by: INTERNAL MEDICINE

## 2022-12-13 PROCEDURE — 99999 PR PBB SHADOW E&M-EST. PATIENT-LVL V: CPT | Mod: PBBFAC,,, | Performed by: INTERNAL MEDICINE

## 2022-12-13 PROCEDURE — 3008F PR BODY MASS INDEX (BMI) DOCUMENTED: ICD-10-PCS | Mod: CPTII,S$GLB,, | Performed by: INTERNAL MEDICINE

## 2022-12-13 PROCEDURE — 1159F MED LIST DOCD IN RCRD: CPT | Mod: CPTII,S$GLB,, | Performed by: INTERNAL MEDICINE

## 2022-12-13 PROCEDURE — 3077F SYST BP >= 140 MM HG: CPT | Mod: CPTII,S$GLB,, | Performed by: INTERNAL MEDICINE

## 2022-12-13 PROCEDURE — 3008F BODY MASS INDEX DOCD: CPT | Mod: CPTII,S$GLB,, | Performed by: INTERNAL MEDICINE

## 2022-12-13 PROCEDURE — 3077F PR MOST RECENT SYSTOLIC BLOOD PRESSURE >= 140 MM HG: ICD-10-PCS | Mod: CPTII,S$GLB,, | Performed by: INTERNAL MEDICINE

## 2022-12-13 PROCEDURE — 1159F PR MEDICATION LIST DOCUMENTED IN MEDICAL RECORD: ICD-10-PCS | Mod: CPTII,S$GLB,, | Performed by: INTERNAL MEDICINE

## 2022-12-13 PROCEDURE — 1160F PR REVIEW ALL MEDS BY PRESCRIBER/CLIN PHARMACIST DOCUMENTED: ICD-10-PCS | Mod: CPTII,S$GLB,, | Performed by: INTERNAL MEDICINE

## 2022-12-13 PROCEDURE — 1160F RVW MEDS BY RX/DR IN RCRD: CPT | Mod: CPTII,S$GLB,, | Performed by: INTERNAL MEDICINE

## 2022-12-13 PROCEDURE — 99215 PR OFFICE/OUTPT VISIT, EST, LEVL V, 40-54 MIN: ICD-10-PCS | Mod: S$GLB,,, | Performed by: INTERNAL MEDICINE

## 2022-12-13 PROCEDURE — 3079F PR MOST RECENT DIASTOLIC BLOOD PRESSURE 80-89 MM HG: ICD-10-PCS | Mod: CPTII,S$GLB,, | Performed by: INTERNAL MEDICINE

## 2022-12-13 PROCEDURE — 99999 PR PBB SHADOW E&M-EST. PATIENT-LVL V: ICD-10-PCS | Mod: PBBFAC,,, | Performed by: INTERNAL MEDICINE

## 2022-12-13 NOTE — PROGRESS NOTES
Subjective:       Patient ID: Corinne Morrison Marcus is a 57 y.o. female.    Chief Complaint: NP Malignant neoplasm of right breast in female, estrogen r    HPI    Returns for follow up    In the interval:  [employer] and [place] issues around coverage- she is going through the billing issues now  Unclear if she has to pay out of pocket  She feels this frustration is increasing her blood pressure    Notes fatigue  She has questions about testosterone pellets- referral placed    In the interval underwent OSMEL/BSO- details below  Date of Operation: 11/21/2022  Title of Operation:  1)  Total laparoscopic hysterectomy with bilateral salpingo-oophorectomy  2)  Laparoscopic bilateral uterosacral vaginal vault suspension  3)  Placement of retropubic tension-free midurethral sling, Advantage Fit ([employer])  4)  Cystourethroscopy  Recovery from hysterectomy was harder than she anticipated  Notes a blister- has topical antibiotic ointment and improving    Pathology:  UTERUS, CERVIX AND BILATERAL ADNEXA WEIGHING 103 G SHOWING:   INACTIVE ENDOMETRIUM WITH A SMALL BENIGN ENDOMETRIAL POLYP   THE CERVIX HAS NABOTHIAN CYSTS   SMALL INTRAMURAL AND SUBMUCOSAL LEIOMYOMAS ARE PRESENT   UNREMARKABLE LEFT AND RIGHT OVARIES   UNREMARKABLE LEFT AND RIGHT FALLOPIAN TUBES     No hot flashes  Weight slightly down  No pain other than surgical recovery and now resolved    Recent Imaging:  - 12/5/2022 PET scan:  FINDINGS:  Quality of the study: Adequate.  In the head and neck, there are no hypermetabolic lesions worrisome for malignancy. There are no hypermetabolic mucosal lesions, and there are no pathologically enlarged or hypermetabolic lymph nodes.  There is prominent physiologic uptake in the right masseter and bilateral pterygoid muscles and less prominent uptake in the right temporalis muscle.  In the chest, there are no hypermetabolic lesions worrisome for malignancy.  There are no concerning pulmonary nodules or masses, and there  are no pathologically enlarged or hypermetabolic lymph nodes.  There are postsurgical changes of bilateral mammoplasty and right axillary lymph node dissection without suspicious hypermetabolic or mass lesions.  In the abdomen and pelvis, there is physiologic tracer distribution within the abdominal organs and excretion into the genitourinary system.  In the bones, there are no hypermetabolic lesions worrisome for malignancy.  In the extremities, there are no hypermetabolic lesions worrisome for malignancy.  Focal retention of tracer near the left antecubital injection site.  Impression:  1.  No evidence of hypermetabolic tumor.  2.  Postsurgical changes of the breasts and right axilla.    - 11/17/2022 Dexa scan:  Normal bone mineral density.    - 10/16/2022 Transvaginal Ultrasound:  No sonographic abnormality.  No detrimental change from prior.    Oncology History:  Patient is a  56-year-old female w/ hx of HLD, depression, who underwent bilateral breast reduction due to macromastia.  Incidentally patient was found to have right breast invasive carcinoma during resection. Subsequently underwent b/l mastectomy with reconstruction + R SLNB and R ALND on 6/17/22.      Prior screening mammograms w/o mass.   MRI breast without any obvious masses or lymph node involvement.     Tumor size:  1.8  Tumor type:  Right breast invasive carcinoma  Tumor rdgrdrrdarddrderd:rd rd3rd Lymph node status: 1/3 sentinel LN   Hormone status:  ER +, [place] +  HER2 Kendra status: -ve  Other:  Rare focus of lymphovascular invasion     SH: Working as a laywer. . Three boys and two girls - 25, 23, 21, 19, 14.  Gyn Hx: Menses 13 y/o, post-menopausal 54 y/o  FH:  had metastatic melanoma, Maternal grandmother - breast ca 71 y/o  Paternal aunt - breast ca 49 y/o          Labs confirmed post menopausal status  Revision surgery 8/2022 - did well.   Started letrozole mid 9/2022    - [employer] genetics testing negative    Review of Systems   Constitutional:   Positive for fatigue. Negative for activity change, appetite change, chills, fever and unexpected weight change.   HENT:  Negative for nasal congestion, tinnitus and trouble swallowing.    Respiratory:  Negative for cough, shortness of breath and wheezing.    Cardiovascular:  Negative for chest pain, palpitations and leg swelling.   Gastrointestinal:  Negative for abdominal distention, abdominal pain, blood in stool, constipation, diarrhea, nausea and vomiting.   Endocrine: Positive for heat intolerance.   Genitourinary:  Negative for decreased urine volume, difficulty urinating, dysuria, frequency and hematuria.   Musculoskeletal:  Negative for arthralgias, back pain, gait problem, joint swelling and neck pain.   Integumentary:  Negative for pallor and rash.   Neurological:  Negative for dizziness, weakness, light-headedness, numbness and headaches.   Hematological:  Negative for adenopathy. Does not bruise/bleed easily.   Psychiatric/Behavioral:  Negative for confusion, dysphoric mood and sleep disturbance.        Objective:      Physical Exam  Vitals and nursing note reviewed.   Constitutional:       General: She is not in acute distress.     Appearance: Normal appearance. She is well-developed and normal weight.      Comments: Presents alone  Very pleasant   HENT:      Head: Normocephalic and atraumatic.   Eyes:      General: Lids are normal. No scleral icterus.     Conjunctiva/sclera: Conjunctivae normal.      Pupils: Pupils are equal, round, and reactive to light.   Neck:      Thyroid: No thyromegaly.      Vascular: No JVD.      Trachea: Trachea normal.   Cardiovascular:      Rate and Rhythm: Normal rate and regular rhythm.      Heart sounds: Normal heart sounds.   Pulmonary:      Effort: Pulmonary effort is normal. No respiratory distress.      Breath sounds: Normal breath sounds. No wheezing, rhonchi or rales.      Comments: Reconstructed breasts  No masses or lymphadenopathy  Abdominal:      General: Bowel  sounds are normal. There is no distension.      Palpations: Abdomen is soft. There is no mass.      Tenderness: There is no abdominal tenderness. There is no rebound.      Comments: No organomegaly.   Recent surgical site healing well-- one area bandaged but also healing well   Musculoskeletal:         General: No swelling, tenderness or deformity. Normal range of motion.      Cervical back: Normal range of motion and neck supple.      Right lower leg: No edema.      Left lower leg: No edema.      Comments: No spinal or paraspinal tenderness.    Lymphadenopathy:      Head:      Right side of head: No submental or submandibular adenopathy.      Left side of head: No submental or submandibular adenopathy.      Cervical: No cervical adenopathy.      Upper Body:      Right upper body: No supraclavicular or axillary adenopathy.      Left upper body: No supraclavicular or axillary adenopathy.   Skin:     General: Skin is warm and dry.      Capillary Refill: Capillary refill takes less than 2 seconds.      Coloration: Skin is not jaundiced or pale.      Findings: No bruising, erythema or rash.      Nails: There is no clubbing.   Neurological:      General: No focal deficit present.      Mental Status: She is alert and oriented to person, place, and time.      Motor: No weakness.      Coordination: Coordination normal.      Gait: Gait normal.   Psychiatric:         Mood and Affect: Mood normal.         Speech: Speech normal.         Behavior: Behavior normal.         Thought Content: Thought content normal.         Judgment: Judgment normal.       Labs- reviewed  Imaging- reviewed    Assessment:       Problem List Items Addressed This Visit       Breast cancer metastasized to axillary lymph node, right - Primary       Plan:       Overall recovering well from recent surgery  Fatigue noted and will refer to Dr. Marino and Bibi Gaspar- she would like to discuss testosterone pellets  Additionl labs to be ordered     Continue  Letrozole   Bone Mineral density normal - repeat in 1 year    Survivorship referral    RTC 3 months- knows to call for any issues    Route Chart for Scheduling    Med Onc Chart Routing      Follow up with physician 6 months. survivorship referral in next month, labs next week (cbc, tsh, iron studies)   Follow up with SHAUN 3 months. cbc, cmp prior   Infusion scheduling note    Injection scheduling note    Labs    Imaging    Pharmacy appointment    Other referrals

## 2022-12-13 NOTE — TELEPHONE ENCOUNTER
----- Message from Shivani Hull MA sent at 12/13/2022  2:13 PM CST -----  Referred- Nelia Frazier MD- Breast CA metastasized to axillary lymph node, right- [phone]

## 2022-12-15 ENCOUNTER — TELEPHONE (OUTPATIENT)
Dept: OBSTETRICS AND GYNECOLOGY | Facility: CLINIC | Age: 57
End: 2022-12-15
Payer: COMMERCIAL

## 2022-12-15 ENCOUNTER — PATIENT MESSAGE (OUTPATIENT)
Dept: INTERNAL MEDICINE | Facility: CLINIC | Age: 57
End: 2022-12-15
Payer: COMMERCIAL

## 2022-12-15 NOTE — TELEPHONE ENCOUNTER
----- Message from OTTONIEL Vargas MA sent at 12/15/2022  2:08 PM CST -----  Regarding: FW: survivorship appt    ----- Message -----  From: Hannah Lundy  Sent: 12/15/2022   9:07 AM CST  To: OTTONIEL Vargas MA  Subject: survivorship appt                                Good morning Dr Frazier has placed a referral for this patient to get an appointment in the next month with Survivorship.  Can you please assist with getting patient scheduled.    Thank you!  Constantin

## 2022-12-21 ENCOUNTER — LAB VISIT (OUTPATIENT)
Dept: LAB | Facility: OTHER | Age: 57
End: 2022-12-21
Attending: INTERNAL MEDICINE
Payer: COMMERCIAL

## 2022-12-21 DIAGNOSIS — C77.3 BREAST CANCER METASTASIZED TO AXILLARY LYMPH NODE, RIGHT: ICD-10-CM

## 2022-12-21 DIAGNOSIS — C50.911 BREAST CANCER METASTASIZED TO AXILLARY LYMPH NODE, RIGHT: ICD-10-CM

## 2022-12-21 LAB
BASOPHILS # BLD AUTO: 0.03 K/UL (ref 0–0.2)
BASOPHILS NFR BLD: 0.4 % (ref 0–1.9)
DIFFERENTIAL METHOD: ABNORMAL
EOSINOPHIL # BLD AUTO: 0.1 K/UL (ref 0–0.5)
EOSINOPHIL NFR BLD: 1.3 % (ref 0–8)
ERYTHROCYTE [DISTWIDTH] IN BLOOD BY AUTOMATED COUNT: 14.1 % (ref 11.5–14.5)
HCT VFR BLD AUTO: 40.1 % (ref 37–48.5)
HGB BLD-MCNC: 13.5 G/DL (ref 12–16)
IMM GRANULOCYTES # BLD AUTO: 0.02 K/UL (ref 0–0.04)
IMM GRANULOCYTES NFR BLD AUTO: 0.3 % (ref 0–0.5)
IRON SERPL-MCNC: 94 UG/DL (ref 30–160)
LYMPHOCYTES # BLD AUTO: 1.7 K/UL (ref 1–4.8)
LYMPHOCYTES NFR BLD: 21.3 % (ref 18–48)
MCH RBC QN AUTO: 29 PG (ref 27–31)
MCHC RBC AUTO-ENTMCNC: 33.7 G/DL (ref 32–36)
MCV RBC AUTO: 86 FL (ref 82–98)
MONOCYTES # BLD AUTO: 0.7 K/UL (ref 0.3–1)
MONOCYTES NFR BLD: 9.2 % (ref 4–15)
NEUTROPHILS # BLD AUTO: 5.3 K/UL (ref 1.8–7.7)
NEUTROPHILS NFR BLD: 67.5 % (ref 38–73)
NRBC BLD-RTO: 0 /100 WBC
PLATELET # BLD AUTO: 320 K/UL (ref 150–450)
PMV BLD AUTO: 8.7 FL (ref 9.2–12.9)
RBC # BLD AUTO: 4.66 M/UL (ref 4–5.4)
SATURATED IRON: 19 % (ref 20–50)
TOTAL IRON BINDING CAPACITY: 502 UG/DL (ref 250–450)
TRANSFERRIN SERPL-MCNC: 339 MG/DL (ref 200–375)
TSH SERPL DL<=0.005 MIU/L-ACNC: 0.56 UIU/ML (ref 0.4–4)
WBC # BLD AUTO: 7.8 K/UL (ref 3.9–12.7)

## 2022-12-21 PROCEDURE — 84443 ASSAY THYROID STIM HORMONE: CPT | Performed by: INTERNAL MEDICINE

## 2022-12-21 PROCEDURE — 84466 ASSAY OF TRANSFERRIN: CPT | Performed by: INTERNAL MEDICINE

## 2022-12-21 PROCEDURE — 85025 COMPLETE CBC W/AUTO DIFF WBC: CPT | Performed by: INTERNAL MEDICINE

## 2022-12-21 PROCEDURE — 36415 COLL VENOUS BLD VENIPUNCTURE: CPT | Performed by: INTERNAL MEDICINE

## 2022-12-28 ENCOUNTER — HOSPITAL ENCOUNTER (OUTPATIENT)
Dept: RADIOLOGY | Facility: OTHER | Age: 57
Discharge: HOME | End: 2022-12-28
Attending: STUDENT IN AN ORGANIZED HEALTH CARE EDUCATION/TRAINING PROGRAM
Payer: COMMERCIAL

## 2022-12-28 DIAGNOSIS — E04.2 MULTINODULAR THYROID: ICD-10-CM

## 2022-12-28 PROCEDURE — 76536 US EXAM OF HEAD AND NECK: CPT | Mod: TC

## 2022-12-28 PROCEDURE — 76536 US EXAM OF HEAD AND NECK: CPT | Mod: 26,,, | Performed by: RADIOLOGY

## 2022-12-28 PROCEDURE — 76536 US SOFT TISSUE HEAD NECK THYROID: ICD-10-PCS | Mod: 26,,, | Performed by: RADIOLOGY

## 2023-01-04 NOTE — PROGRESS NOTES
"[facility]    Subjective:       Patient ID: Corinne Morrison Marcus is a 57 y.o. female.    Chief Complaint: Annual Exam      History was obtained from the patient and supplemented through chart review.  This patient is known to me.     HPI:    Pt is a 57 y.o. female r/ right breast invasive cancer, HLD, grief presents for annual    Pt has no history of DM and is a non-smoker .     Recent Right Breast invasive cancer s/p resection  S/p bilat mastectomy with reconstruction  Stage 1B IDC of right Breast, ER+, RI+, Node +,  HER2-  Dr. Frazier with heme onc following  Taking letrozole for chemoprevention, joint pain  Stable    Hair thinning   Check ferritin    Grief reaction  Adjustment reaction with anxiety and depression  Seeing psychiatrist Shiva Madera, "on call" doing well, doing therapy as well  Trazodone 50 mg qhs PRN  Used to see Rekha Ariza for grief counseling, no longer  Daughters, son  Taking zoloft 100 mg, stable.    R/B discussed    Thyroid nodule  Did not meet FNA threshold 2021, overdue for f/u  thyroid us normal Dec 2023, will repeat    Lung nodule  5mm stable 2017 -> 2019  No further imaging needed    Perimenopausal symptoms  Had been on compounded hormones in the past    HLD  Improved on statin    Two daughters 18/20 who see me as PCP as well  Two sons   passed after metastatic melanoma  Mother patient      Medical History  Past Medical History:   Diagnosis Date    Abnormal Pap smear of cervix     Abnormal Pap smear of vagina     Anxiety     Breast cancer 06/08/2022    Cancer     Constipation     Depression     Hemorrhoids     HLD (hyperlipidemia)     Lung nodule     Multinodular goiter 2010    s/p bx of 2 nodules in 2011 - benign    PONV (postoperative nausea and vomiting)        Review of Systems   Constitutional:  Negative for fever.   HENT:  Negative for trouble swallowing.    Respiratory:  Negative for shortness of breath.    Cardiovascular:  Negative for chest pain. "   Gastrointestinal:  Negative for constipation, diarrhea, nausea and vomiting.   Musculoskeletal:  Negative for gait problem.   Neurological:  Negative for dizziness and seizures.   Psychiatric/Behavioral:  Negative for dysphoric mood and hallucinations.        Surgical hx, family hx, social hx   Have been reviewed      Current Outpatient Medications:     atorvastatin (LIPITOR) 40 MG tablet, Take 1 tablet (40 mg total) by mouth once daily. (Patient taking differently: Take 40 mg by mouth every morning.), Disp: 90 tablet, Rfl: 3    cetirizine (ZYRTEC) 5 MG tablet, Take 5 mg by mouth once daily., Disp: , Rfl:     docusate sodium (COLACE) 100 MG capsule, Take 1 capsule (100 mg total) by mouth 2 (two) times daily., Disp: 60 capsule, Rfl: 3    fluticasone (FLONASE) 50 mcg/actuation nasal spray, 1 spray by Each Nare route once daily., Disp: , Rfl:     letrozole (FEMARA) 2.5 mg Tab, TAKE 1 TABLET DAILY, Disp: 30 tablet, Rfl: 11    sertraline (ZOLOFT) 100 MG tablet, Take 1 tablet (100 mg total) by mouth once daily. (Patient taking differently: Take 100 mg by mouth every evening.), Disp: 90 tablet, Rfl: 3    spironolactone (ALDACTONE) 100 MG tablet, Take 100 mg by mouth every morning., Disp: , Rfl:     clindamycin-benzoyl peroxide gel, APPLY TOPICALLY TO ACNE AREA EVERY DAY AT NIGHT. MAY DISCOLOR SHEETS AND TOWELS, Disp: , Rfl:     ketorolac (TORADOL) 10 mg tablet, Take 10 mg by mouth every 8 (eight) hours as needed., Disp: , Rfl:     methen-m.blue-s.phos-phsal-hyo (URIBEL) 118-10-40.8-36 mg Cap, Take 1 capsule by mouth 4 (four) times daily as needed (bladder irritability). (Patient not taking: Reported on 1/5/2023), Disp: 30 capsule, Rfl: 11    minoxidiL (LONITEN) 2.5 MG tablet, Take 2.5 mg by mouth., Disp: , Rfl:     ondansetron (ZOFRAN-ODT) 8 MG TbDL, Take 1 tablet (8 mg total) by mouth every 6 (six) hours as needed (nausea). (Patient not taking: Reported on 11/3/2022), Disp: 15 tablet, Rfl: 1    predniSONE (DELTASONE) 20  "MG tablet, Take 40 mg by mouth., Disp: , Rfl:     traZODone (DESYREL) 50 MG tablet, TAKE 1 TABLET(50 MG) BY MOUTH EVERY EVENING, Disp: 90 tablet, Rfl: 3    Objective:        Body mass index is 25.39 kg/m².  Vitals:    01/06/23 0810   BP: 118/80   Pulse: 87   SpO2: 98%   Weight: 67.1 kg (147 lb 14.9 oz)   Height: 5' 4" (1.626 m)   PainSc: 0-No pain       Physical Exam  Vitals and nursing note reviewed.   Constitutional:       General: She is not in acute distress.     Appearance: Normal appearance. She is not ill-appearing.   HENT:      Head: Normocephalic and atraumatic.   Eyes:      General: No scleral icterus.  Cardiovascular:      Rate and Rhythm: Normal rate and regular rhythm.      Heart sounds: Normal heart sounds.   Pulmonary:      Effort: Pulmonary effort is normal.   Musculoskeletal:         General: No deformity.      Cervical back: Normal range of motion.   Skin:     General: Skin is warm and dry.   Neurological:      Mental Status: She is alert and oriented to person, place, and time.   Psychiatric:         Behavior: Behavior normal.         Lab Results   Component Value Date    WBC 7.10 01/06/2023    HGB 13.1 01/06/2023    HCT 39.4 01/06/2023     01/06/2023    CHOL 222 (H) 01/06/2023    TRIG 166 (H) 01/06/2023    HDL 67 01/06/2023    ALT 20 01/06/2023    ALT 20 01/06/2023    AST 25 01/06/2023    AST 25 01/06/2023     01/06/2023     01/06/2023    K 4.0 01/06/2023    K 4.0 01/06/2023     01/06/2023     01/06/2023    CREATININE 0.9 01/06/2023    CREATININE 0.9 01/06/2023    BUN 16 01/06/2023    BUN 16 01/06/2023    CO2 26 01/06/2023    CO2 26 01/06/2023    TSH 0.565 12/21/2022    INR 1.0 08/11/2022    HGBA1C 5.5 10/29/2021       The 10-year ASCVD risk score (Gavino BERNAL, et al., 2019) is: 1.9%    Values used to calculate the score:      Age: 57 years      Sex: Female      Is Non- : No      Diabetic: No      Tobacco smoker: No      Systolic Blood " Pressure: 118 mmHg      Is BP treated: No      HDL Cholesterol: 67 mg/dL      Total Cholesterol: 222 mg/dL    (Imaging have been independently reviewed)  Reviewed MRI breast 6/2022    Assessment:         1. Breast cancer metastasized to axillary lymph node, right    2. S/p TLH/BSO/USLS/MUS/Cystoscopy 11/21/22    3. Hypercholesterolemia    4. Current moderate episode of major depressive disorder without prior episode    5. Multinodular thyroid    6. Iron deficiency    7. Unspecified disturbances of skin sensation            Plan:     Corinne was seen today for annual exam.    Diagnoses and all orders for this visit:    Breast cancer metastasized to axillary lymph node, right  -     COMPREHENSIVE METABOLIC PANEL; Future    S/p TLH/BSO/USLS/MUS/Cystoscopy 11/21/22    Hypercholesterolemia  -     Lipid Panel; Future    Current moderate episode of major depressive disorder without prior episode    Multinodular thyroid    Iron deficiency  -     FERRITIN; Future    Unspecified disturbances of skin sensation  -     Vitamin B12; Future      Health Maintenance  - Lipids: normal 2023 on statin  - A1C:   - Colon Ca Screen: colonoscopy Dr. Solorzano 2021, repeat 5 years (pt thought 10) due to adenoma on pathology  - Immunizations: cvid vaccinated    Women's health  - Pap: NILM Nov 2020  - Mammo: s/p breast cancer  - Dexa: N/A due to age  - Contraception: post-menopausal    Follow up in about 6 months (around 7/6/2023). I        All medications were reviewed including potential side effects and risks/benefits.  Pt was counseled to call back if anything worsens or if questions arise.    Gene Sterling MD  Family Medicine  [facility]  [address] [place] [address]  [address] [place] [id number]  Phone [phone]  Fax [phone]

## 2023-01-05 ENCOUNTER — OFFICE VISIT (OUTPATIENT)
Dept: UROGYNECOLOGY | Facility: CLINIC | Age: 58
End: 2023-01-05
Payer: COMMERCIAL

## 2023-01-05 VITALS
BODY MASS INDEX: 25.41 KG/M2 | SYSTOLIC BLOOD PRESSURE: 136 MMHG | WEIGHT: 148.81 LBS | HEIGHT: 64 IN | DIASTOLIC BLOOD PRESSURE: 94 MMHG

## 2023-01-05 DIAGNOSIS — N95.2 VAGINAL ATROPHY: ICD-10-CM

## 2023-01-05 DIAGNOSIS — N39.3 SUI (STRESS URINARY INCONTINENCE, FEMALE): Primary | ICD-10-CM

## 2023-01-05 DIAGNOSIS — C77.3 BREAST CANCER METASTASIZED TO AXILLARY LYMPH NODE, RIGHT: ICD-10-CM

## 2023-01-05 DIAGNOSIS — N95.1 MENOPAUSAL SYMPTOM: ICD-10-CM

## 2023-01-05 DIAGNOSIS — C50.911 BREAST CANCER METASTASIZED TO AXILLARY LYMPH NODE, RIGHT: ICD-10-CM

## 2023-01-05 PROCEDURE — 99999 PR PBB SHADOW E&M-EST. PATIENT-LVL IV: ICD-10-PCS | Mod: PBBFAC,,, | Performed by: OBSTETRICS & GYNECOLOGY

## 2023-01-05 PROCEDURE — 1160F RVW MEDS BY RX/DR IN RCRD: CPT | Mod: CPTII,S$GLB,, | Performed by: OBSTETRICS & GYNECOLOGY

## 2023-01-05 PROCEDURE — 99024 PR POST-OP FOLLOW-UP VISIT: ICD-10-PCS | Mod: S$GLB,,, | Performed by: OBSTETRICS & GYNECOLOGY

## 2023-01-05 PROCEDURE — 3075F PR MOST RECENT SYSTOLIC BLOOD PRESS GE 130-139MM HG: ICD-10-PCS | Mod: CPTII,S$GLB,, | Performed by: OBSTETRICS & GYNECOLOGY

## 2023-01-05 PROCEDURE — 99999 PR PBB SHADOW E&M-EST. PATIENT-LVL IV: CPT | Mod: PBBFAC,,, | Performed by: OBSTETRICS & GYNECOLOGY

## 2023-01-05 PROCEDURE — 1160F PR REVIEW ALL MEDS BY PRESCRIBER/CLIN PHARMACIST DOCUMENTED: ICD-10-PCS | Mod: CPTII,S$GLB,, | Performed by: OBSTETRICS & GYNECOLOGY

## 2023-01-05 PROCEDURE — 1159F MED LIST DOCD IN RCRD: CPT | Mod: CPTII,S$GLB,, | Performed by: OBSTETRICS & GYNECOLOGY

## 2023-01-05 PROCEDURE — 3008F PR BODY MASS INDEX (BMI) DOCUMENTED: ICD-10-PCS | Mod: CPTII,S$GLB,, | Performed by: OBSTETRICS & GYNECOLOGY

## 2023-01-05 PROCEDURE — 3008F BODY MASS INDEX DOCD: CPT | Mod: CPTII,S$GLB,, | Performed by: OBSTETRICS & GYNECOLOGY

## 2023-01-05 PROCEDURE — 99024 POSTOP FOLLOW-UP VISIT: CPT | Mod: S$GLB,,, | Performed by: OBSTETRICS & GYNECOLOGY

## 2023-01-05 PROCEDURE — 1159F PR MEDICATION LIST DOCUMENTED IN MEDICAL RECORD: ICD-10-PCS | Mod: CPTII,S$GLB,, | Performed by: OBSTETRICS & GYNECOLOGY

## 2023-01-05 PROCEDURE — 3080F PR MOST RECENT DIASTOLIC BLOOD PRESSURE >= 90 MM HG: ICD-10-PCS | Mod: CPTII,S$GLB,, | Performed by: OBSTETRICS & GYNECOLOGY

## 2023-01-05 PROCEDURE — 3075F SYST BP GE 130 - 139MM HG: CPT | Mod: CPTII,S$GLB,, | Performed by: OBSTETRICS & GYNECOLOGY

## 2023-01-05 PROCEDURE — 3080F DIAST BP >= 90 MM HG: CPT | Mod: CPTII,S$GLB,, | Performed by: OBSTETRICS & GYNECOLOGY

## 2023-01-05 RX ORDER — MINOXIDIL 2.5 MG/1
2.5 TABLET ORAL
COMMUNITY
Start: 2022-10-03

## 2023-01-05 RX ORDER — DIAZEPAM 5 MG/1
5 TABLET ORAL NIGHTLY PRN
COMMUNITY
Start: 2022-08-25 | End: 2023-01-06

## 2023-01-05 RX ORDER — PREDNISONE 20 MG/1
40 TABLET ORAL
COMMUNITY
Start: 2022-11-16 | End: 2023-05-08

## 2023-01-05 RX ORDER — CLINDAMYCIN PHOSPHATE AND BENZOYL PEROXIDE 10; 50 MG/G; MG/G
GEL TOPICAL
COMMUNITY
Start: 2022-12-12

## 2023-01-05 RX ORDER — KETOROLAC TROMETHAMINE 10 MG/1
10 TABLET, FILM COATED ORAL EVERY 8 HOURS PRN
COMMUNITY
Start: 2022-08-30 | End: 2023-05-08

## 2023-01-05 NOTE — PROGRESS NOTES
Urogyn follow up  01/08/2023    [facility] - UROGYNECOLOGY  [address] [address]    Corinne Morrison Marcus  [id number]  1965      Corinne Morrison Marcus is a 57 y.o. here for postop check.     Date of Operation: 11/21/2022     Title of Operation:  1)  Total laparoscopic hysterectomy with bilateral salpingo-oophorectomy  2)  Laparoscopic bilateral uterosacral vaginal vault suspension  3)  Placement of retropubic tension-free midurethral sling, Advantage Fit ([employer])  4)  Cystourethroscopy     Indications for Surgery:  1)  UI:  + YELITZA (tennis, cough, sneeze) = (+) UUI (standing/spontaneous, walking)  X 14years.  (+) pads:2/day, usually severe wetness (should use more/day) and 0/night, does not endorse usual pads at night.  Daytime frequency: Q 1 hours.  Nocturia: No: 1/night.   (--) dysuria,  (--) hematuria,  (--) frequent UTIs, used to have frequent UTIs 8-10 yrs ago. + complete bladder emptying.   --was Rx'd oxybutynin but never tried  --has tried Kegels--didn't help  +YELITZA on UDS 11-3-2022     2)  POP:  Absent.  (--) vaginal bleeding. (--) vaginal discharge. (--) sexually active.  (--) dyspareunia. (--)  Vaginal dryness.  (--) vaginal estrogen use.   --POP-Q:  Aa -1; Ba -1; C -5; Ap -1; Bp -1; D -7.  Genital hiatus 3, perineal body 2, total vaginal length 10-11.     3)  BM:  (--) constipation/straining. Take 4 senekot a night.  (--) chronic diarrhea. (--) hematochezia.  (--) fecal incontinence.  (--) fecal smearing/urgency.  (--) complete evacuation.   --sees Dr. Solorzano      4)  Desires hysterectomy:  --for risk reduction with h/o breast CA  8/2022 , estradiol 17  --discussed with Ned/Freddie and will proceed with BSO  --pelvic US 10/2022 normal     Preoperative Diagnosis:  1)  Mixed urinary incontinence  2)  Stage 2 cystocele  3)  Stage 2 rectocele  4)  History of estrogen-sensitive breast cancer  5)  Concern for voiding dysfunction (Valsalva assist)  6)   Urodynamic stress incontinence     Postoperative Diagnosis:  1)  Mixed urinary incontinence  2)  Stage 2 cystocele  3)  Stage 2 rectocele  4)  History of estrogen-sensitive breast cancer  5)  Concern for voiding dysfunction (Valsalva assist)  6)  Urodynamic stress incontinence    Issues include:  Patient Active Problem List   Diagnosis    Multinodular thyroid    Hypercholesterolemia    YELITZA (stress urinary incontinence, female)    Anxiety and depression    Solitary lung nodule    Grief reaction    Colon cancer screening    Macromastia    Breast cancer metastasized to axillary lymph node, right    Breast pain    Current moderate episode of major depressive disorder without prior episode    Cystocele, midline    Rectocele, female    Urinary incontinence, mixed    S/p TLH/BSO/USLS/MUS/Cystoscopy 11/21/22    Vaginal atrophy    Menopausal symptom     History since last visit:  GYN:  no VB, discharge, pain.   Bladder issues: No UI, U/F, dysuria. +complete emptying.   Bowel issues: +h/o constipation.  Takes senekot/ducolax.    Derm: has a RL/mid c-shaped area that blistered--healing with mupirocin   Menopause sx: stable HF; +NS worse since surgery.     Medications:    Current Outpatient Medications:     atorvastatin (LIPITOR) 40 MG tablet, Take 1 tablet (40 mg total) by mouth once daily. (Patient taking differently: Take 40 mg by mouth every morning.), Disp: 90 tablet, Rfl: 3    cetirizine (ZYRTEC) 5 MG tablet, Take 5 mg by mouth once daily., Disp: , Rfl:     fluticasone (FLONASE) 50 mcg/actuation nasal spray, 1 spray by Each Nare route once daily., Disp: , Rfl:     letrozole (FEMARA) 2.5 mg Tab, TAKE 1 TABLET DAILY, Disp: 30 tablet, Rfl: 11    sertraline (ZOLOFT) 100 MG tablet, Take 1 tablet (100 mg total) by mouth once daily. (Patient taking differently: Take 100 mg by mouth every evening.), Disp: 90 tablet, Rfl: 3    spironolactone (ALDACTONE) 100 MG tablet, Take 100 mg by mouth every morning., Disp: , Rfl:     traZODone  "(DESYREL) 50 MG tablet, Take 1 tablet (50 mg total) by mouth every evening., Disp: 30 tablet, Rfl: 3    clindamycin-benzoyl peroxide gel, APPLY TOPICALLY TO ACNE AREA EVERY DAY AT NIGHT. MAY DISCOLOR SHEETS AND TOWELS, Disp: , Rfl:     docusate sodium (COLACE) 100 MG capsule, Take 1 capsule (100 mg total) by mouth 2 (two) times daily., Disp: 60 capsule, Rfl: 3    ketorolac (TORADOL) 10 mg tablet, Take 10 mg by mouth every 8 (eight) hours as needed., Disp: , Rfl:     methluisam.blue-s.phos-phsal-hyo (URIBEL) 118-10-40.8-36 mg Cap, Take 1 capsule by mouth 4 (four) times daily as needed (bladder irritability). (Patient not taking: Reported on 1/5/2023), Disp: 30 capsule, Rfl: 11    minoxidiL (LONITEN) 2.5 MG tablet, Take 2.5 mg by mouth., Disp: , Rfl:     ondansetron (ZOFRAN-ODT) 8 MG TbDL, Take 1 tablet (8 mg total) by mouth every 6 (six) hours as needed (nausea). (Patient not taking: Reported on 11/3/2022), Disp: 15 tablet, Rfl: 1    predniSONE (DELTASONE) 20 MG tablet, Take 40 mg by mouth., Disp: , Rfl:     ROS:  As per HPI.      Exam  BP (!) 136/94 (BP Location: Right arm, Patient Position: Sitting, BP Method: Medium (Manual))   Ht 5' 4" (1.626 m)   Wt 67.5 kg (148 lb 13 oz)   LMP 03/18/2019   BMI 25.54 kg/m²   General: alert and oriented, no acute distress  Respiratory: normal respiratory effort  Abd: soft, non-tender, non-distended  INC: LSC well-healed; c-shaped skin scar RLQ well-healed    Pelvic  Ext. Genitalia: normal external genitalia. Normal bartholins and skenes glands  Vagina: + min atrophy. Normal vaginal mucosa without lesions. No discharge noted.   Non-tender bladder base without palpable mass. Sling path well-healed, NT, no mesh visualized/palpated.   Cervix: absent  Uterus:  surgically absent, vaginal cuff well healed   Urethra: no masses or tenderness  Urethral meatus: no lesions, caruncle or prolapse.    POP-Q: deferred. NO POP with valsalva.     Impression  1. s/p TLH/BSO, retropubic MUS on " 11-        2. Breast cancer metastasized to axillary lymph node, right        3. Vaginal atrophy        4. Menopausal symptom          We reviewed the above issues and discussed options for short-term versus long-term management of her problems.   Plan:   Postop: well-healed. Resume normal activities.  Wait 1 more month for things in the vagina.   Abdominal scar:   Continue using antibiotic ointment.   Mild vaginal dryness:  Discuss with Dr. Marino.  Treating may help reduce UTIs and bladder urgency/frequency.   Increased night sweats:   Discuss with Dr. Marino.  Always try to minimize straining with BMs.   She will follow up with us in May for 6 month check.   20 minutes were spent in face to face time with this patient  90 % of this time was spent in counseling and/or coordination of care     Sheree Holden MD  [facility]  Division of Female Pelvic Medicine and Reconstructive Surgery  Department of Obstetrics & Gynecology

## 2023-01-05 NOTE — PATIENT INSTRUCTIONS
Postop: well-healed. Resume normal activities.  Wait 1 more month for things in the vagina.   Abdominal scar:   Continue using antibiotic ointment.   Mild vaginal dryness:  Discuss with Dr. Marino.  Treating may help reduce UTIs and bladder urgency/frequency.   Increased night sweats:   Discuss with Dr. Marino.  Always try to minimize straining with BMs.   She will follow up with us in May for 6 month check.

## 2023-01-06 ENCOUNTER — LAB VISIT (OUTPATIENT)
Dept: LAB | Facility: OTHER | Age: 58
End: 2023-01-06
Attending: STUDENT IN AN ORGANIZED HEALTH CARE EDUCATION/TRAINING PROGRAM
Payer: COMMERCIAL

## 2023-01-06 ENCOUNTER — OFFICE VISIT (OUTPATIENT)
Dept: INTERNAL MEDICINE | Facility: CLINIC | Age: 58
End: 2023-01-06
Payer: COMMERCIAL

## 2023-01-06 VITALS
HEIGHT: 64 IN | DIASTOLIC BLOOD PRESSURE: 80 MMHG | OXYGEN SATURATION: 98 % | BODY MASS INDEX: 25.25 KG/M2 | HEART RATE: 87 BPM | SYSTOLIC BLOOD PRESSURE: 118 MMHG | WEIGHT: 147.94 LBS

## 2023-01-06 DIAGNOSIS — C77.3 BREAST CANCER METASTASIZED TO AXILLARY LYMPH NODE, RIGHT: ICD-10-CM

## 2023-01-06 DIAGNOSIS — F32.1 CURRENT MODERATE EPISODE OF MAJOR DEPRESSIVE DISORDER WITHOUT PRIOR EPISODE: ICD-10-CM

## 2023-01-06 DIAGNOSIS — E04.2 MULTINODULAR THYROID: ICD-10-CM

## 2023-01-06 DIAGNOSIS — C50.911 BREAST CANCER METASTASIZED TO AXILLARY LYMPH NODE, RIGHT: ICD-10-CM

## 2023-01-06 DIAGNOSIS — E61.1 IRON DEFICIENCY: ICD-10-CM

## 2023-01-06 DIAGNOSIS — Z90.710 S/P LAPAROSCOPIC HYSTERECTOMY: ICD-10-CM

## 2023-01-06 DIAGNOSIS — C77.3 BREAST CANCER METASTASIZED TO AXILLARY LYMPH NODE, RIGHT: Primary | ICD-10-CM

## 2023-01-06 DIAGNOSIS — Z17.0 MALIGNANT NEOPLASM OF RIGHT BREAST IN FEMALE, ESTROGEN RECEPTOR POSITIVE, UNSPECIFIED SITE OF BREAST: ICD-10-CM

## 2023-01-06 DIAGNOSIS — C50.911 MALIGNANT NEOPLASM OF RIGHT BREAST IN FEMALE, ESTROGEN RECEPTOR POSITIVE, UNSPECIFIED SITE OF BREAST: ICD-10-CM

## 2023-01-06 DIAGNOSIS — R20.9 UNSPECIFIED DISTURBANCES OF SKIN SENSATION: ICD-10-CM

## 2023-01-06 DIAGNOSIS — E78.00 HYPERCHOLESTEROLEMIA: ICD-10-CM

## 2023-01-06 DIAGNOSIS — E55.9 VITAMIN D DEFICIENCY: ICD-10-CM

## 2023-01-06 DIAGNOSIS — C50.911 BREAST CANCER METASTASIZED TO AXILLARY LYMPH NODE, RIGHT: Primary | ICD-10-CM

## 2023-01-06 LAB
25(OH)D3+25(OH)D2 SERPL-MCNC: 41 NG/ML (ref 30–96)
ALBUMIN SERPL BCP-MCNC: 4.3 G/DL (ref 3.5–5.2)
ALBUMIN SERPL BCP-MCNC: 4.3 G/DL (ref 3.5–5.2)
ALP SERPL-CCNC: 86 U/L (ref 55–135)
ALP SERPL-CCNC: 86 U/L (ref 55–135)
ALT SERPL W/O P-5'-P-CCNC: 20 U/L (ref 10–44)
ALT SERPL W/O P-5'-P-CCNC: 20 U/L (ref 10–44)
ANION GAP SERPL CALC-SCNC: 7 MMOL/L (ref 8–16)
ANION GAP SERPL CALC-SCNC: 7 MMOL/L (ref 8–16)
AST SERPL-CCNC: 25 U/L (ref 10–40)
AST SERPL-CCNC: 25 U/L (ref 10–40)
BILIRUB SERPL-MCNC: 0.7 MG/DL (ref 0.1–1)
BILIRUB SERPL-MCNC: 0.7 MG/DL (ref 0.1–1)
BUN SERPL-MCNC: 16 MG/DL (ref 6–20)
BUN SERPL-MCNC: 16 MG/DL (ref 6–20)
CALCIUM SERPL-MCNC: 10.4 MG/DL (ref 8.7–10.5)
CALCIUM SERPL-MCNC: 10.4 MG/DL (ref 8.7–10.5)
CHLORIDE SERPL-SCNC: 104 MMOL/L (ref 95–110)
CHLORIDE SERPL-SCNC: 104 MMOL/L (ref 95–110)
CHOLEST SERPL-MCNC: 222 MG/DL (ref 120–199)
CHOLEST/HDLC SERPL: 3.3 {RATIO} (ref 2–5)
CO2 SERPL-SCNC: 26 MMOL/L (ref 23–29)
CO2 SERPL-SCNC: 26 MMOL/L (ref 23–29)
CREAT SERPL-MCNC: 0.9 MG/DL (ref 0.5–1.4)
CREAT SERPL-MCNC: 0.9 MG/DL (ref 0.5–1.4)
ERYTHROCYTE [DISTWIDTH] IN BLOOD BY AUTOMATED COUNT: 14.2 % (ref 11.5–14.5)
EST. GFR  (NO RACE VARIABLE): >60 ML/MIN/1.73 M^2
EST. GFR  (NO RACE VARIABLE): >60 ML/MIN/1.73 M^2
FERRITIN SERPL-MCNC: 63 NG/ML (ref 20–300)
GLUCOSE SERPL-MCNC: 91 MG/DL (ref 70–110)
GLUCOSE SERPL-MCNC: 91 MG/DL (ref 70–110)
HCT VFR BLD AUTO: 39.4 % (ref 37–48.5)
HDLC SERPL-MCNC: 67 MG/DL (ref 40–75)
HDLC SERPL: 30.2 % (ref 20–50)
HGB BLD-MCNC: 13.1 G/DL (ref 12–16)
IMM GRANULOCYTES # BLD AUTO: 0.04 K/UL (ref 0–0.04)
LDLC SERPL CALC-MCNC: 121.8 MG/DL (ref 63–159)
MCH RBC QN AUTO: 28.6 PG (ref 27–31)
MCHC RBC AUTO-ENTMCNC: 33.2 G/DL (ref 32–36)
MCV RBC AUTO: 86 FL (ref 82–98)
NEUTROPHILS # BLD AUTO: 4.6 K/UL (ref 1.8–7.7)
NONHDLC SERPL-MCNC: 155 MG/DL
PLATELET # BLD AUTO: 345 K/UL (ref 150–450)
PMV BLD AUTO: 8.9 FL (ref 9.2–12.9)
POTASSIUM SERPL-SCNC: 4 MMOL/L (ref 3.5–5.1)
POTASSIUM SERPL-SCNC: 4 MMOL/L (ref 3.5–5.1)
PROT SERPL-MCNC: 8.1 G/DL (ref 6–8.4)
PROT SERPL-MCNC: 8.1 G/DL (ref 6–8.4)
RBC # BLD AUTO: 4.58 M/UL (ref 4–5.4)
SODIUM SERPL-SCNC: 137 MMOL/L (ref 136–145)
SODIUM SERPL-SCNC: 137 MMOL/L (ref 136–145)
TRIGL SERPL-MCNC: 166 MG/DL (ref 30–150)
VIT B12 SERPL-MCNC: 372 PG/ML (ref 210–950)
WBC # BLD AUTO: 7.1 K/UL (ref 3.9–12.7)

## 2023-01-06 PROCEDURE — 3074F SYST BP LT 130 MM HG: CPT | Mod: CPTII,S$GLB,, | Performed by: STUDENT IN AN ORGANIZED HEALTH CARE EDUCATION/TRAINING PROGRAM

## 2023-01-06 PROCEDURE — 80053 COMPREHEN METABOLIC PANEL: CPT | Performed by: NURSE PRACTITIONER

## 2023-01-06 PROCEDURE — 99999 PR PBB SHADOW E&M-EST. PATIENT-LVL IV: ICD-10-PCS | Mod: PBBFAC,,, | Performed by: STUDENT IN AN ORGANIZED HEALTH CARE EDUCATION/TRAINING PROGRAM

## 2023-01-06 PROCEDURE — 3074F PR MOST RECENT SYSTOLIC BLOOD PRESSURE < 130 MM HG: ICD-10-PCS | Mod: CPTII,S$GLB,, | Performed by: STUDENT IN AN ORGANIZED HEALTH CARE EDUCATION/TRAINING PROGRAM

## 2023-01-06 PROCEDURE — 3008F BODY MASS INDEX DOCD: CPT | Mod: CPTII,S$GLB,, | Performed by: STUDENT IN AN ORGANIZED HEALTH CARE EDUCATION/TRAINING PROGRAM

## 2023-01-06 PROCEDURE — 82728 ASSAY OF FERRITIN: CPT | Performed by: STUDENT IN AN ORGANIZED HEALTH CARE EDUCATION/TRAINING PROGRAM

## 2023-01-06 PROCEDURE — 99999 PR PBB SHADOW E&M-EST. PATIENT-LVL IV: CPT | Mod: PBBFAC,,, | Performed by: STUDENT IN AN ORGANIZED HEALTH CARE EDUCATION/TRAINING PROGRAM

## 2023-01-06 PROCEDURE — 36415 COLL VENOUS BLD VENIPUNCTURE: CPT | Performed by: NURSE PRACTITIONER

## 2023-01-06 PROCEDURE — 99396 PREV VISIT EST AGE 40-64: CPT | Mod: S$GLB,,, | Performed by: STUDENT IN AN ORGANIZED HEALTH CARE EDUCATION/TRAINING PROGRAM

## 2023-01-06 PROCEDURE — 3008F PR BODY MASS INDEX (BMI) DOCUMENTED: ICD-10-PCS | Mod: CPTII,S$GLB,, | Performed by: STUDENT IN AN ORGANIZED HEALTH CARE EDUCATION/TRAINING PROGRAM

## 2023-01-06 PROCEDURE — 82306 VITAMIN D 25 HYDROXY: CPT | Performed by: NURSE PRACTITIONER

## 2023-01-06 PROCEDURE — 99396 PR PREVENTIVE VISIT,EST,40-64: ICD-10-PCS | Mod: S$GLB,,, | Performed by: STUDENT IN AN ORGANIZED HEALTH CARE EDUCATION/TRAINING PROGRAM

## 2023-01-06 PROCEDURE — 82607 VITAMIN B-12: CPT | Performed by: STUDENT IN AN ORGANIZED HEALTH CARE EDUCATION/TRAINING PROGRAM

## 2023-01-06 PROCEDURE — 3079F DIAST BP 80-89 MM HG: CPT | Mod: CPTII,S$GLB,, | Performed by: STUDENT IN AN ORGANIZED HEALTH CARE EDUCATION/TRAINING PROGRAM

## 2023-01-06 PROCEDURE — 80061 LIPID PANEL: CPT | Performed by: STUDENT IN AN ORGANIZED HEALTH CARE EDUCATION/TRAINING PROGRAM

## 2023-01-06 PROCEDURE — 85027 COMPLETE CBC AUTOMATED: CPT | Performed by: NURSE PRACTITIONER

## 2023-01-06 PROCEDURE — 3079F PR MOST RECENT DIASTOLIC BLOOD PRESSURE 80-89 MM HG: ICD-10-PCS | Mod: CPTII,S$GLB,, | Performed by: STUDENT IN AN ORGANIZED HEALTH CARE EDUCATION/TRAINING PROGRAM

## 2023-01-07 ENCOUNTER — PATIENT MESSAGE (OUTPATIENT)
Dept: INTERNAL MEDICINE | Facility: CLINIC | Age: 58
End: 2023-01-07
Payer: COMMERCIAL

## 2023-01-07 DIAGNOSIS — G47.00 INSOMNIA, UNSPECIFIED TYPE: ICD-10-CM

## 2023-01-08 PROBLEM — N95.1 MENOPAUSAL SYMPTOM: Status: ACTIVE | Noted: 2023-01-08

## 2023-01-08 PROBLEM — N95.2 VAGINAL ATROPHY: Status: ACTIVE | Noted: 2023-01-08

## 2023-01-08 NOTE — TELEPHONE ENCOUNTER
Refill Routing Note   Medication(s) are not appropriate for processing by [facility] for the following reason(s):      - Medication is a new start (<3 months)    [facility] action(s):  Defer          Medication reconciliation completed: No     Appointments  past 12m or future 3m with PCP    Date Provider   Last Visit   1/6/2023 Gene Sterling MD   Next Visit   6/2/2023 Gene Sterling MD   ED visits in past 90 days: 0        Note composed:1:00 PM 01/08/2023

## 2023-01-08 NOTE — TELEPHONE ENCOUNTER
No new care gaps identified.  [facility] Embedded Care Gaps. Reference number: [id number]. 1/07/2023   9:21:06 PM CST

## 2023-01-09 RX ORDER — TRAZODONE HYDROCHLORIDE 50 MG/1
TABLET ORAL
Qty: 90 TABLET | Refills: 3 | Status: SHIPPED | OUTPATIENT
Start: 2023-01-09 | End: 2023-09-21 | Stop reason: SDUPTHER

## 2023-01-23 ENCOUNTER — PATIENT MESSAGE (OUTPATIENT)
Dept: OBSTETRICS AND GYNECOLOGY | Facility: CLINIC | Age: 58
End: 2023-01-23
Payer: COMMERCIAL

## 2023-01-26 ENCOUNTER — PATIENT MESSAGE (OUTPATIENT)
Dept: OBSTETRICS AND GYNECOLOGY | Facility: CLINIC | Age: 58
End: 2023-01-26

## 2023-01-26 ENCOUNTER — OFFICE VISIT (OUTPATIENT)
Dept: OBSTETRICS AND GYNECOLOGY | Facility: CLINIC | Age: 58
End: 2023-01-26
Attending: OBSTETRICS & GYNECOLOGY
Payer: COMMERCIAL

## 2023-01-26 DIAGNOSIS — C77.3 BREAST CANCER METASTASIZED TO AXILLARY LYMPH NODE, RIGHT: ICD-10-CM

## 2023-01-26 DIAGNOSIS — N95.1 MENOPAUSAL SYMPTOM: ICD-10-CM

## 2023-01-26 DIAGNOSIS — C50.911 BREAST CANCER METASTASIZED TO AXILLARY LYMPH NODE, RIGHT: ICD-10-CM

## 2023-01-26 DIAGNOSIS — Z79.811 USE OF AROMATASE INHIBITORS: ICD-10-CM

## 2023-01-26 DIAGNOSIS — L65.9 HAIR LOSS: Primary | ICD-10-CM

## 2023-01-26 PROCEDURE — 99213 PR OFFICE/OUTPT VISIT, EST, LEVL III, 20-29 MIN: ICD-10-PCS | Mod: 95,24,, | Performed by: OBSTETRICS & GYNECOLOGY

## 2023-01-26 PROCEDURE — 99213 OFFICE O/P EST LOW 20 MIN: CPT | Mod: 95,24,, | Performed by: OBSTETRICS & GYNECOLOGY

## 2023-01-27 ENCOUNTER — PATIENT MESSAGE (OUTPATIENT)
Dept: OBSTETRICS AND GYNECOLOGY | Facility: CLINIC | Age: 58
End: 2023-01-27
Payer: COMMERCIAL

## 2023-01-27 NOTE — PROGRESS NOTES
"The patient location is: her car   The chief complaint leading to consultation is: hair loss, menopausal symptoms    Visit type: audiovisual    Face to Face time with patient:   25 minutes of total time spent on the encounter, which includes face to face time and non-face to face time preparing to see the patient (eg, review of tests), Obtaining and/or revewing separately obtained history, Documenting clinical information in the electronic or other health record, Independently interpreting results (not separately reported) and communicating results to the patient/family/caregiver, or Care coordination (not separately reported).         Each patient to whom he or she provides medical services by telemedicine is:  (1) informed of the relationship between the physician and patient and the respective role of any other health care provider with respect to management of the patient; and (2) notified that he or she may decline to receive medical services by telemedicine and may withdraw from such care at any time.    Notes:     SUBJECTIVE:   57 y.o. female  presents today to discuss menopausal symptoms and hair loss. . Patient's last menstrual period was 2019..  She had ER+Pr+ IDC right breast and is s/p bilateral mastectomy with R ALND. She had revision with plastics at [facility].  She had TLH/BSO. /colpopexy and sling with Dr. Holden  She is having hot flashes but "not terrible" She wants to discuss testsoterone  secondary to side effects of Letrozole.   She reports "terrible hair loss." She is waiting for appointment with Dr. Hopkins- Dermatology for hair loss       Past Medical History:   Diagnosis Date    Abnormal Pap smear of cervix     Abnormal Pap smear of vagina     Anxiety     Breast cancer 2022    Cancer     Constipation     Depression     Hemorrhoids     HLD (hyperlipidemia)     Lung nodule     Multinodular goiter     s/p bx of 2 nodules in  - benign    PONV (postoperative nausea and " vomiting)      Past Surgical History:   Procedure Laterality Date    APPENDECTOMY      BILATERAL MASTECTOMY Bilateral 06/17/2022    BILATERAL SALPINGO-OOPHORECTOMY (BSO) N/A 11/21/2022    Procedure: SALPINGO-OOPHORECTOMY, BILATERAL;  Surgeon: Sheree Holden MD;  Location: [facility];  Service: OB/GYN;  Laterality: N/A;    BREAST RECONSTRUCTION Bilateral 08/29/2022    COLONOSCOPY      4 as of 2015 [place] at [facility], next due in 2025    COLONOSCOPY N/A 10/27/2021    Procedure: COLONOSCOPY;  Surgeon: Garland Whalen MD;  Location: [facility] ENDO (4TH FLR);  Service: Colon and Rectal;  Laterality: N/A;  Covid test 10/24 [place], instructions sent to [facility]   10/25 arrival time confirmed with pt-rb    COLPOPEXY N/A 11/21/2022    Procedure: UTEROSACRAL LIGAMENT SUSPENSION;  Surgeon: Sheree Holden MD;  Location: [facility];  Service: OB/GYN;  Laterality: N/A;    COLPOSCOPY  11/19/2014    EXCISIONAL HEMORRHOIDECTOMY      INSERTION OF MIDURETHRAL SLING N/A 11/21/2022    Procedure: SLING, MIDURETHRAL;  Surgeon: Sheree Holden MD;  Location: [facility];  Service: OB/GYN;  Laterality: N/A;    LAPAROSCOPIC TOTAL HYSTERECTOMY N/A 11/21/2022    Procedure: HYSTERECTOMY, TOTAL, LAPAROSCOPIC;  Surgeon: Sheree Holden MD;  Location: [facility];  Service: OB/GYN;  Laterality: N/A;    TOTAL REDUCTION MAMMOPLASTY      TOTAL REDUCTION MAMMOPLASTY Bilateral 06/01/2022    Procedure: MAMMOPLASTY, REDUCTION;  Surgeon: Dex Gutierrez MD;  Location: [facility];  Service: Plastics;  Laterality: Bilateral;     Social History     Socioeconomic History    Marital status:     Number of children: 5   Occupational History    Occupation:      Comment:  - not practicing   Tobacco Use    Smoking status: Never    Smokeless tobacco: Never   Substance and Sexual Activity    Alcohol use: Yes     Comment: socially    Drug use: No    Sexual activity: Yes     Partners: Male     Birth control/protection: None   Social History  Narrative    Originally from [place]    Living in [place] with family        Getting reg exercise     Family History   Problem Relation Age of Onset    Breast cancer Maternal Grandmother 68    Hypertension Mother     Heart attack Mother     Lung cancer Father         lung, + tobacco    No Known Problems Brother     No Known Problems Daughter     Other Son         lymphedema    No Known Problems Son     No Known Problems Son     No Known Problems Daughter     Ovarian cancer Neg Hx     Colon cancer Neg Hx     Cancer Neg Hx      OB History    Para Term  AB Living   5 5 0         SAB IAB Ectopic Multiple Live Births                  # Outcome Date GA Lbr Charanjit/2nd Weight Sex Delivery Anes PTL Lv   5 Para      Vag-Spont      4 Para      Vag-Spont      3 Para      Vag-Spont      2 Para      Vag-Spont      1 Para      Vag-Spont              Current Outpatient Medications   Medication Sig Dispense Refill    atorvastatin (LIPITOR) 40 MG tablet Take 1 tablet (40 mg total) by mouth once daily. (Patient taking differently: Take 40 mg by mouth every morning.) 90 tablet 3    cetirizine (ZYRTEC) 5 MG tablet Take 5 mg by mouth once daily.      clindamycin-benzoyl peroxide gel APPLY TOPICALLY TO ACNE AREA EVERY DAY AT NIGHT. MAY DISCOLOR SHEETS AND TOWELS      docusate sodium (COLACE) 100 MG capsule Take 1 capsule (100 mg total) by mouth 2 (two) times daily. 60 capsule 3    fluticasone (FLONASE) 50 mcg/actuation nasal spray 1 spray by Each Nare route once daily.      ketorolac (TORADOL) 10 mg tablet Take 10 mg by mouth every 8 (eight) hours as needed.      letrozole (FEMARA) 2.5 mg Tab TAKE 1 TABLET DAILY 30 tablet 11    methen-m.blue-s.phos-phsal-hyo (URIBEL) 118-10-40.8-36 mg Cap Take 1 capsule by mouth 4 (four) times daily as needed (bladder irritability). (Patient not taking: Reported on 2023) 30 capsule 11    minoxidiL (LONITEN) 2.5 MG tablet Take 2.5 mg by mouth.      ondansetron (ZOFRAN-ODT) 8 MG TbDL Take 1 tablet  (8 mg total) by mouth every 6 (six) hours as needed (nausea). (Patient not taking: Reported on 11/3/2022) 15 tablet 1    predniSONE (DELTASONE) 20 MG tablet Take 40 mg by mouth.      sertraline (ZOLOFT) 100 MG tablet Take 1 tablet (100 mg total) by mouth once daily. (Patient taking differently: Take 100 mg by mouth every evening.) 90 tablet 3    spironolactone (ALDACTONE) 100 MG tablet Take 100 mg by mouth every morning.      traZODone (DESYREL) 50 MG tablet TAKE 1 TABLET(50 MG) BY MOUTH EVERY EVENING 90 tablet 3     No current facility-administered medications for this visit.     Allergies: Patient has no known allergies.     The 10-year ASCVD risk score (Gavino BERNAL, et al., 2019) is: 1.9%    Values used to calculate the score:      Age: 57 years      Sex: Female      Is Non- : No      Diabetic: No      Tobacco smoker: No      Systolic Blood Pressure: 118 mmHg      Is BP treated: No      HDL Cholesterol: 67 mg/dL      Total Cholesterol: 222 mg/dL      ROS:  Constitutional: no weight loss, weight gain, fever, fatigue  Eyes:  No vision changes, glasses/contacts  ENT/Mouth: No ulcers, sinus problems, ears ringing, headache  Cardiovascular: No inability to lie flat, chest pain, exercise intolerance, swelling, heart palpitations  Respiratory: No wheezing, coughing blood, shortness of breath, or cough  Gastrointestinal: No diarrhea, bloody stool, nausea/vomiting, constipation, gas, hemorrhoids  Genitourinary: No blood in urine, painful urination, urgency of urination, frequency of urination, incomplete emptying, incontinence, abnormal bleeding, painful periods, heavy periods, vaginal discharge, vaginal odor, painful intercourse, sexual problems, bleeding after intercourse.  Musculoskeletal: No muscle weakness  Skin/Breast: No painful breasts, nipple discharge, masses, rash, ulcers, +breast cancer  Neurological: No passing out, seizures, numbness, headache  Endocrine: No diabetes, hypothyroid,  hyperthyroid, +hot flashes, +hair loss, abnormal hair growth, acne  Psychiatric: No depression, crying, +anxiety  Hematologic: No bruises, bleeding, swollen lymph nodes, anemia.      Physical Exam  deferred    ASSESSMENT:   1. Hair loss        2. Breast cancer metastasized to axillary lymph node, right  Ambulatory referral/consult to [facility]      3. Menopausal symptom        4. Use of aromatase inhibitor      PLAN:   Counseled her on risks/benefits and possible side effects of testosterone therapy  Given her hair loss, I would not recommend testosterone treatment at this time  Follow up with Dr. Hopkins   Encouraged vaginal moisturizer   Counseled her on use of iron

## 2023-03-06 ENCOUNTER — TELEPHONE (OUTPATIENT)
Dept: HEMATOLOGY/ONCOLOGY | Facility: CLINIC | Age: 58
End: 2023-03-06
Payer: COMMERCIAL

## 2023-03-17 ENCOUNTER — PATIENT MESSAGE (OUTPATIENT)
Dept: RESEARCH | Facility: HOSPITAL | Age: 58
End: 2023-03-17
Payer: COMMERCIAL

## 2023-03-21 ENCOUNTER — PATIENT MESSAGE (OUTPATIENT)
Dept: INTERNAL MEDICINE | Facility: CLINIC | Age: 58
End: 2023-03-21
Payer: COMMERCIAL

## 2023-03-22 ENCOUNTER — PATIENT MESSAGE (OUTPATIENT)
Dept: INTERNAL MEDICINE | Facility: CLINIC | Age: 58
End: 2023-03-22
Payer: COMMERCIAL

## 2023-03-22 DIAGNOSIS — Z71.84 TRAVEL ADVICE ENCOUNTER: Primary | ICD-10-CM

## 2023-03-23 NOTE — TELEPHONE ENCOUNTER
MD Hallie Davey Staff 15 minutes ago (1:12 PM)       Referral placed     Also, daughter may not need a referral. Prob they can just schedule

## 2023-03-31 ENCOUNTER — TELEPHONE (OUTPATIENT)
Dept: HEMATOLOGY/ONCOLOGY | Facility: CLINIC | Age: 58
End: 2023-03-31
Payer: COMMERCIAL

## 2023-04-03 ENCOUNTER — LAB VISIT (OUTPATIENT)
Dept: LAB | Facility: HOSPITAL | Age: 58
End: 2023-04-03
Attending: INTERNAL MEDICINE
Payer: COMMERCIAL

## 2023-04-03 ENCOUNTER — OFFICE VISIT (OUTPATIENT)
Dept: HEMATOLOGY/ONCOLOGY | Facility: CLINIC | Age: 58
End: 2023-04-03
Payer: COMMERCIAL

## 2023-04-03 VITALS
HEART RATE: 77 BPM | OXYGEN SATURATION: 100 % | TEMPERATURE: 99 F | RESPIRATION RATE: 19 BRPM | SYSTOLIC BLOOD PRESSURE: 134 MMHG | DIASTOLIC BLOOD PRESSURE: 84 MMHG | BODY MASS INDEX: 24.88 KG/M2 | HEIGHT: 64 IN | WEIGHT: 145.75 LBS

## 2023-04-03 DIAGNOSIS — Z90.710 S/P LAPAROSCOPIC HYSTERECTOMY: ICD-10-CM

## 2023-04-03 DIAGNOSIS — C50.911 BREAST CANCER METASTASIZED TO AXILLARY LYMPH NODE, RIGHT: Primary | ICD-10-CM

## 2023-04-03 DIAGNOSIS — C77.3 BREAST CANCER METASTASIZED TO AXILLARY LYMPH NODE, RIGHT: Primary | ICD-10-CM

## 2023-04-03 DIAGNOSIS — C77.3 BREAST CANCER METASTASIZED TO AXILLARY LYMPH NODE, RIGHT: ICD-10-CM

## 2023-04-03 DIAGNOSIS — C50.911 BREAST CANCER METASTASIZED TO AXILLARY LYMPH NODE, RIGHT: ICD-10-CM

## 2023-04-03 LAB
ALBUMIN SERPL BCP-MCNC: 4.1 G/DL (ref 3.5–5.2)
ALP SERPL-CCNC: 83 U/L (ref 55–135)
ALT SERPL W/O P-5'-P-CCNC: 14 U/L (ref 10–44)
ANION GAP SERPL CALC-SCNC: 11 MMOL/L (ref 8–16)
AST SERPL-CCNC: 16 U/L (ref 10–40)
BASOPHILS # BLD AUTO: 0.04 K/UL (ref 0–0.2)
BASOPHILS NFR BLD: 0.6 % (ref 0–1.9)
BILIRUB SERPL-MCNC: 0.7 MG/DL (ref 0.1–1)
BUN SERPL-MCNC: 16 MG/DL (ref 6–20)
CALCIUM SERPL-MCNC: 10 MG/DL (ref 8.7–10.5)
CHLORIDE SERPL-SCNC: 100 MMOL/L (ref 95–110)
CO2 SERPL-SCNC: 26 MMOL/L (ref 23–29)
CREAT SERPL-MCNC: 0.9 MG/DL (ref 0.5–1.4)
DIFFERENTIAL METHOD: ABNORMAL
EOSINOPHIL # BLD AUTO: 0.1 K/UL (ref 0–0.5)
EOSINOPHIL NFR BLD: 1.4 % (ref 0–8)
ERYTHROCYTE [DISTWIDTH] IN BLOOD BY AUTOMATED COUNT: 12.9 % (ref 11.5–14.5)
EST. GFR  (NO RACE VARIABLE): >60 ML/MIN/1.73 M^2
GLUCOSE SERPL-MCNC: 103 MG/DL (ref 70–110)
HCT VFR BLD AUTO: 40.2 % (ref 37–48.5)
HGB BLD-MCNC: 13.4 G/DL (ref 12–16)
IMM GRANULOCYTES # BLD AUTO: 0.02 K/UL (ref 0–0.04)
IMM GRANULOCYTES NFR BLD AUTO: 0.3 % (ref 0–0.5)
LYMPHOCYTES # BLD AUTO: 2.1 K/UL (ref 1–4.8)
LYMPHOCYTES NFR BLD: 29.2 % (ref 18–48)
MCH RBC QN AUTO: 29.4 PG (ref 27–31)
MCHC RBC AUTO-ENTMCNC: 33.3 G/DL (ref 32–36)
MCV RBC AUTO: 88 FL (ref 82–98)
MONOCYTES # BLD AUTO: 0.7 K/UL (ref 0.3–1)
MONOCYTES NFR BLD: 10.3 % (ref 4–15)
NEUTROPHILS # BLD AUTO: 4.1 K/UL (ref 1.8–7.7)
NEUTROPHILS NFR BLD: 58.2 % (ref 38–73)
NRBC BLD-RTO: 0 /100 WBC
PLATELET # BLD AUTO: 346 K/UL (ref 150–450)
PMV BLD AUTO: 8.7 FL (ref 9.2–12.9)
POTASSIUM SERPL-SCNC: 4.5 MMOL/L (ref 3.5–5.1)
PROT SERPL-MCNC: 7.7 G/DL (ref 6–8.4)
RBC # BLD AUTO: 4.56 M/UL (ref 4–5.4)
SODIUM SERPL-SCNC: 137 MMOL/L (ref 136–145)
WBC # BLD AUTO: 7.02 K/UL (ref 3.9–12.7)

## 2023-04-03 PROCEDURE — 3008F PR BODY MASS INDEX (BMI) DOCUMENTED: ICD-10-PCS | Mod: CPTII,S$GLB,, | Performed by: NURSE PRACTITIONER

## 2023-04-03 PROCEDURE — 99214 PR OFFICE/OUTPT VISIT, EST, LEVL IV, 30-39 MIN: ICD-10-PCS | Mod: S$GLB,,, | Performed by: NURSE PRACTITIONER

## 2023-04-03 PROCEDURE — 99999 PR PBB SHADOW E&M-EST. PATIENT-LVL IV: CPT | Mod: PBBFAC,,, | Performed by: NURSE PRACTITIONER

## 2023-04-03 PROCEDURE — 99999 PR PBB SHADOW E&M-EST. PATIENT-LVL IV: ICD-10-PCS | Mod: PBBFAC,,, | Performed by: NURSE PRACTITIONER

## 2023-04-03 PROCEDURE — 1159F MED LIST DOCD IN RCRD: CPT | Mod: CPTII,S$GLB,, | Performed by: NURSE PRACTITIONER

## 2023-04-03 PROCEDURE — 80053 COMPREHEN METABOLIC PANEL: CPT | Performed by: INTERNAL MEDICINE

## 2023-04-03 PROCEDURE — 99214 OFFICE O/P EST MOD 30 MIN: CPT | Mod: S$GLB,,, | Performed by: NURSE PRACTITIONER

## 2023-04-03 PROCEDURE — 1159F PR MEDICATION LIST DOCUMENTED IN MEDICAL RECORD: ICD-10-PCS | Mod: CPTII,S$GLB,, | Performed by: NURSE PRACTITIONER

## 2023-04-03 PROCEDURE — 3008F BODY MASS INDEX DOCD: CPT | Mod: CPTII,S$GLB,, | Performed by: NURSE PRACTITIONER

## 2023-04-03 PROCEDURE — 3075F PR MOST RECENT SYSTOLIC BLOOD PRESS GE 130-139MM HG: ICD-10-PCS | Mod: CPTII,S$GLB,, | Performed by: NURSE PRACTITIONER

## 2023-04-03 PROCEDURE — 85025 COMPLETE CBC W/AUTO DIFF WBC: CPT | Performed by: INTERNAL MEDICINE

## 2023-04-03 PROCEDURE — 3079F PR MOST RECENT DIASTOLIC BLOOD PRESSURE 80-89 MM HG: ICD-10-PCS | Mod: CPTII,S$GLB,, | Performed by: NURSE PRACTITIONER

## 2023-04-03 PROCEDURE — 3079F DIAST BP 80-89 MM HG: CPT | Mod: CPTII,S$GLB,, | Performed by: NURSE PRACTITIONER

## 2023-04-03 PROCEDURE — 36415 COLL VENOUS BLD VENIPUNCTURE: CPT | Performed by: INTERNAL MEDICINE

## 2023-04-03 PROCEDURE — 3075F SYST BP GE 130 - 139MM HG: CPT | Mod: CPTII,S$GLB,, | Performed by: NURSE PRACTITIONER

## 2023-04-03 NOTE — PROGRESS NOTES
Subjective:       Patient ID: Corinne Morrison Marcus is a 57 y.o. female.    Chief Complaint: Malignant neoplasm of right breast in female, estrogen rece    HPI    Returns for follow up  Overall doing well  Ankle pain in AM- better once moves around.   No other pain issues.   Appetite good and weight stable  No CP/SOB  No fever/chills.   No swelling.   Energy level good until 1pm but better after nap  Opted out of testosterone.       Previously,  underwent OSMEL/BSO- details below  Date of Operation: 11/21/2022  Title of Operation:  1)  Total laparoscopic hysterectomy with bilateral salpingo-oophorectomy  2)  Laparoscopic bilateral uterosacral vaginal vault suspension  3)  Placement of retropubic tension-free midurethral sling, Advantage Fit ([employer])  4)  Cystourethroscopy  Recovery from hysterectomy was harder than she anticipated  Notes a blister- has topical antibiotic ointment and improving    Pathology:  UTERUS, CERVIX AND BILATERAL ADNEXA WEIGHING 103 G SHOWING:   INACTIVE ENDOMETRIUM WITH A SMALL BENIGN ENDOMETRIAL POLYP   THE CERVIX HAS NABOTHIAN CYSTS   SMALL INTRAMURAL AND SUBMUCOSAL LEIOMYOMAS ARE PRESENT   UNREMARKABLE LEFT AND RIGHT OVARIES   UNREMARKABLE LEFT AND RIGHT FALLOPIAN TUBES       Recent Imaging:  - 12/5/2022 PET scan:  FINDINGS:  Quality of the study: Adequate.  In the head and neck, there are no hypermetabolic lesions worrisome for malignancy. There are no hypermetabolic mucosal lesions, and there are no pathologically enlarged or hypermetabolic lymph nodes.  There is prominent physiologic uptake in the right masseter and bilateral pterygoid muscles and less prominent uptake in the right temporalis muscle.  In the chest, there are no hypermetabolic lesions worrisome for malignancy.  There are no concerning pulmonary nodules or masses, and there are no pathologically enlarged or hypermetabolic lymph nodes.  There are postsurgical changes of bilateral mammoplasty and right axillary  lymph node dissection without suspicious hypermetabolic or mass lesions.  In the abdomen and pelvis, there is physiologic tracer distribution within the abdominal organs and excretion into the genitourinary system.  In the bones, there are no hypermetabolic lesions worrisome for malignancy.  In the extremities, there are no hypermetabolic lesions worrisome for malignancy.  Focal retention of tracer near the left antecubital injection site.  Impression:  1.  No evidence of hypermetabolic tumor.  2.  Postsurgical changes of the breasts and right axilla.    - 11/17/2022 Dexa scan:  Normal bone mineral density.    - 10/16/2022 Transvaginal Ultrasound:  No sonographic abnormality.  No detrimental change from prior.    Oncology History:  Patient is a  56-year-old female w/ hx of HLD, depression, who underwent bilateral breast reduction due to macromastia.  Incidentally patient was found to have right breast invasive carcinoma during resection. Subsequently underwent b/l mastectomy with reconstruction + R SLNB and R ALND on 6/17/22.      Prior screening mammograms w/o mass.   MRI breast without any obvious masses or lymph node involvement.     Tumor size:  1.8  Tumor type:  Right breast invasive carcinoma  Tumor rdgrdrrdarddrderd:rd rd3rd Lymph node status: 1/3 sentinel LN   Hormone status:  ER +, SC +  HER2 Kendra status: -ve  Other:  Rare focus of lymphovascular invasion     SH: Working as a laywer. . Three boys and two girls - 25, 23, 21, 19, 14.  Gyn Hx: Menses 15 y/o, post-menopausal 56 y/o  FH:  had metastatic melanoma, Maternal grandmother - breast ca 71 y/o  Paternal aunt - breast ca 51 y/o          Labs confirmed post menopausal status  Revision surgery 8/2022 - did well.   Started letrozole mid 9/2022    - [employer] genetics testing negative    Review of Systems   Constitutional:         See above   All other systems reviewed and are negative.      Objective:      Physical Exam  Vitals and nursing note reviewed.    Constitutional:       General: She is not in acute distress.     Appearance: Normal appearance. She is well-developed and normal weight.      Comments: Presents alone  Very pleasant   HENT:      Head: Normocephalic and atraumatic.   Eyes:      General: Lids are normal. No scleral icterus.     Conjunctiva/sclera: Conjunctivae normal.      Pupils: Pupils are equal, round, and reactive to light.   Neck:      Thyroid: No thyromegaly.      Vascular: No JVD.      Trachea: Trachea normal.   Cardiovascular:      Rate and Rhythm: Normal rate and regular rhythm.      Heart sounds: Normal heart sounds.   Pulmonary:      Effort: Pulmonary effort is normal. No respiratory distress.      Breath sounds: Normal breath sounds. No wheezing, rhonchi or rales.      Comments: Reconstructed breasts  No masses or lymphadenopathy  Abdominal:      General: Bowel sounds are normal. There is no distension.      Palpations: Abdomen is soft. There is no mass.      Tenderness: There is no abdominal tenderness. There is no rebound.      Comments: No organomegaly.   Recent surgical site healed well.   Musculoskeletal:         General: No swelling, tenderness or deformity. Normal range of motion.      Cervical back: Normal range of motion and neck supple.      Right lower leg: No edema.      Left lower leg: No edema.      Comments: No spinal or paraspinal tenderness.    Lymphadenopathy:      Head:      Right side of head: No submental or submandibular adenopathy.      Left side of head: No submental or submandibular adenopathy.      Cervical: No cervical adenopathy.      Upper Body:      Right upper body: No supraclavicular or axillary adenopathy.      Left upper body: No supraclavicular or axillary adenopathy.   Skin:     General: Skin is warm and dry.      Capillary Refill: Capillary refill takes less than 2 seconds.      Coloration: Skin is not jaundiced or pale.      Findings: No bruising, erythema or rash.      Nails: There is no clubbing.    Neurological:      General: No focal deficit present.      Mental Status: She is alert and oriented to person, place, and time.      Motor: No weakness.      Coordination: Coordination normal.      Gait: Gait normal.   Psychiatric:         Mood and Affect: Mood normal.         Speech: Speech normal.         Behavior: Behavior normal.         Thought Content: Thought content normal.         Judgment: Judgment normal.       Labs- reviewed  Imaging- reviewed    Assessment:       Problem List Items Addressed This Visit    None        Plan:         Overall doing well and clinically CHUY  Continue Letrozole.   Continue Vit D   BMD 11/2023  Weight bearing exercising.   Colonoscopy 2031    RTC 3 months- knows to call for any issues    Route Chart for Scheduling    Med Onc Chart Routing      Follow up with physician 3 months. please move her next appt back a month as she is due in 3months. thanks   Follow up with SHAUN    Infusion scheduling note    Injection scheduling note    Labs CBC, CMP, TSH, iron and TIBC and ferritin   Scheduling:  Preferred lab:  Lab interval:     Imaging    Pharmacy appointment    Other referrals                    Patient is in agreement with the proposed treatment plan. All questions were answered to the patient's satisfaction. Pt knows to call clinic for any new or worsening symptoms and if anything is needed before the next clinic visit.      Jorge Ely, ANMOLP-C  Hematology & Oncology  [facility] [place] [address]  ph. [phone]  Fax. [phone]       30 minutes of total time spent on the encounter, which includes face to face time and non-face to face time preparing to see the patient (eg, review of tests), Obtaining and/or reviewing separately obtained history, Documenting clinical information in the electronic or other health record, Independently interpreting results (not separately reported) and communicating results to the patient/family/caregiver, or Care  coordination (not separately reported).

## 2023-04-05 DIAGNOSIS — Z79.811 AROMATASE INHIBITOR USE: ICD-10-CM

## 2023-04-05 DIAGNOSIS — C77.3 BREAST CANCER METASTASIZED TO AXILLARY LYMPH NODE, RIGHT: Primary | ICD-10-CM

## 2023-04-05 DIAGNOSIS — C50.911 BREAST CANCER METASTASIZED TO AXILLARY LYMPH NODE, RIGHT: Primary | ICD-10-CM

## 2023-05-08 ENCOUNTER — OFFICE VISIT (OUTPATIENT)
Dept: UROGYNECOLOGY | Facility: CLINIC | Age: 58
End: 2023-05-08
Payer: COMMERCIAL

## 2023-05-08 VITALS
BODY MASS INDEX: 24.88 KG/M2 | DIASTOLIC BLOOD PRESSURE: 80 MMHG | HEIGHT: 64 IN | SYSTOLIC BLOOD PRESSURE: 140 MMHG | WEIGHT: 145.75 LBS

## 2023-05-08 DIAGNOSIS — C77.3 BREAST CANCER METASTASIZED TO AXILLARY LYMPH NODE, RIGHT: ICD-10-CM

## 2023-05-08 DIAGNOSIS — C50.911 BREAST CANCER METASTASIZED TO AXILLARY LYMPH NODE, RIGHT: ICD-10-CM

## 2023-05-08 DIAGNOSIS — N95.2 VAGINAL ATROPHY: ICD-10-CM

## 2023-05-08 DIAGNOSIS — N39.3 SUI (STRESS URINARY INCONTINENCE, FEMALE): Primary | ICD-10-CM

## 2023-05-08 DIAGNOSIS — N95.1 MENOPAUSAL SYMPTOM: ICD-10-CM

## 2023-05-08 PROCEDURE — 99213 OFFICE O/P EST LOW 20 MIN: CPT | Mod: S$GLB,,, | Performed by: OBSTETRICS & GYNECOLOGY

## 2023-05-08 PROCEDURE — 3077F PR MOST RECENT SYSTOLIC BLOOD PRESSURE >= 140 MM HG: ICD-10-PCS | Mod: CPTII,S$GLB,, | Performed by: OBSTETRICS & GYNECOLOGY

## 2023-05-08 PROCEDURE — 3077F SYST BP >= 140 MM HG: CPT | Mod: CPTII,S$GLB,, | Performed by: OBSTETRICS & GYNECOLOGY

## 2023-05-08 PROCEDURE — 3008F BODY MASS INDEX DOCD: CPT | Mod: CPTII,S$GLB,, | Performed by: OBSTETRICS & GYNECOLOGY

## 2023-05-08 PROCEDURE — 3079F DIAST BP 80-89 MM HG: CPT | Mod: CPTII,S$GLB,, | Performed by: OBSTETRICS & GYNECOLOGY

## 2023-05-08 PROCEDURE — 99999 PR PBB SHADOW E&M-EST. PATIENT-LVL IV: CPT | Mod: PBBFAC,,, | Performed by: OBSTETRICS & GYNECOLOGY

## 2023-05-08 PROCEDURE — 3079F PR MOST RECENT DIASTOLIC BLOOD PRESSURE 80-89 MM HG: ICD-10-PCS | Mod: CPTII,S$GLB,, | Performed by: OBSTETRICS & GYNECOLOGY

## 2023-05-08 PROCEDURE — 1159F PR MEDICATION LIST DOCUMENTED IN MEDICAL RECORD: ICD-10-PCS | Mod: CPTII,S$GLB,, | Performed by: OBSTETRICS & GYNECOLOGY

## 2023-05-08 PROCEDURE — 1159F MED LIST DOCD IN RCRD: CPT | Mod: CPTII,S$GLB,, | Performed by: OBSTETRICS & GYNECOLOGY

## 2023-05-08 PROCEDURE — 3008F PR BODY MASS INDEX (BMI) DOCUMENTED: ICD-10-PCS | Mod: CPTII,S$GLB,, | Performed by: OBSTETRICS & GYNECOLOGY

## 2023-05-08 PROCEDURE — 99213 PR OFFICE/OUTPT VISIT, EST, LEVL III, 20-29 MIN: ICD-10-PCS | Mod: S$GLB,,, | Performed by: OBSTETRICS & GYNECOLOGY

## 2023-05-08 PROCEDURE — 99999 PR PBB SHADOW E&M-EST. PATIENT-LVL IV: ICD-10-PCS | Mod: PBBFAC,,, | Performed by: OBSTETRICS & GYNECOLOGY

## 2023-05-08 NOTE — PROGRESS NOTES
Urogyn follow up  05/08/2023    [facility] - UROGYNECOLOGY  [address] [address]    Corinne Morrison Marcus  [id number]  1965      Corinne Morrison Marcus is a 57 y.o. here for postop check.     Date of Operation: 11/21/2022     Title of Operation:  1)  Total laparoscopic hysterectomy with bilateral salpingo-oophorectomy  2)  Laparoscopic bilateral uterosacral vaginal vault suspension  3)  Placement of retropubic tension-free midurethral sling, Advantage Fit ([employer])  4)  Cystourethroscopy     Indications for Surgery:  1)  UI:  + YELITZA (tennis, cough, sneeze) = (+) UUI (standing/spontaneous, walking)  X 14years.  (+) pads:2/day, usually severe wetness (should use more/day) and 0/night, does not endorse usual pads at night.  Daytime frequency: Q 1 hours.  Nocturia: No: 1/night.   (--) dysuria,  (--) hematuria,  (--) frequent UTIs, used to have frequent UTIs 8-10 yrs ago. + complete bladder emptying.   --was Rx'd oxybutynin but never tried  --has tried Kegels--didn't help  +YELITZA on UDS 11-3-2022     2)  POP:  Absent.  (--) vaginal bleeding. (--) vaginal discharge. (--) sexually active.  (--) dyspareunia. (--)  Vaginal dryness.  (--) vaginal estrogen use.   --POP-Q:  Aa -1; Ba -1; C -5; Ap -1; Bp -1; D -7.  Genital hiatus 3, perineal body 2, total vaginal length 10-11.     3)  BM:  (--) constipation/straining. Take 4 senekot a night.  (--) chronic diarrhea. (--) hematochezia.  (--) fecal incontinence.  (--) fecal smearing/urgency.  (--) complete evacuation.   --sees Dr. Solorzano      4)  Desires hysterectomy:  --for risk reduction with h/o breast CA  8/2022 , estradiol 17  --discussed with Ned/Freddie and will proceed with BSO  --pelvic US 10/2022 normal     Preoperative Diagnosis:  1)  Mixed urinary incontinence  2)  Stage 2 cystocele  3)  Stage 2 rectocele  4)  History of estrogen-sensitive breast cancer  5)  Concern for voiding dysfunction (Valsalva assist)  6)   Urodynamic stress incontinence     Postoperative Diagnosis:  1)  Mixed urinary incontinence  2)  Stage 2 cystocele  3)  Stage 2 rectocele  4)  History of estrogen-sensitive breast cancer  5)  Concern for voiding dysfunction (Valsalva assist)  6)  Urodynamic stress incontinence    Issues include:  Patient Active Problem List   Diagnosis    Multinodular thyroid    Hypercholesterolemia    YELITZA (stress urinary incontinence, female)    Anxiety and depression    Solitary lung nodule    Grief reaction    Colon cancer screening    Macromastia    Breast cancer metastasized to axillary lymph node, right    Breast pain    Current moderate episode of major depressive disorder without prior episode    Cystocele, midline    Rectocele, female    Urinary incontinence, mixed    S/p TLH/BSO/USLS/MUS/Cystoscopy 11/21/22    Vaginal atrophy    Menopausal symptom     History since last visit:  GYN:  no VB, discharge, pain.   Bladder issues: No UI, dysuria. +complete emptying. +mild urgency.   Bowel issues: +h/o constipation.  Takes senekot/ducolax.    Derm: has a RL/mid c-shaped area that blistered--healed with mupirocin   Menopause sx: stable HF; +NS worse since surgery.     Medications:    Current Outpatient Medications:     atorvastatin (LIPITOR) 40 MG tablet, TAKE 1 TABLET DAILY, Disp: 90 tablet, Rfl: 2    cetirizine (ZYRTEC) 5 MG tablet, Take 5 mg by mouth once daily., Disp: , Rfl:     clindamycin-benzoyl peroxide gel, APPLY TOPICALLY TO ACNE AREA EVERY DAY AT NIGHT. MAY DISCOLOR SHEETS AND TOWELS, Disp: , Rfl:     docusate sodium (COLACE) 100 MG capsule, Take 1 capsule (100 mg total) by mouth 2 (two) times daily., Disp: 60 capsule, Rfl: 3    fluticasone (FLONASE) 50 mcg/actuation nasal spray, 1 spray by Each Nare route once daily., Disp: , Rfl:     letrozole (FEMARA) 2.5 mg Tab, TAKE 1 TABLET DAILY, Disp: 30 tablet, Rfl: 11    minoxidiL (LONITEN) 2.5 MG tablet, Take 2.5 mg by mouth., Disp: , Rfl:     ondansetron (ZOFRAN-ODT) 8 MG  "TbDL, Take 1 tablet (8 mg total) by mouth every 6 (six) hours as needed (nausea)., Disp: 15 tablet, Rfl: 1    sertraline (ZOLOFT) 100 MG tablet, TAKE 1 TABLET DAILY, Disp: 90 tablet, Rfl: 2    spironolactone (ALDACTONE) 100 MG tablet, Take 100 mg by mouth every morning., Disp: , Rfl:     traZODone (DESYREL) 50 MG tablet, TAKE 1 TABLET(50 MG) BY MOUTH EVERY EVENING, Disp: 90 tablet, Rfl: 3    ROS:  As per HPI.      Exam  BP (!) 140/80 (BP Location: Right arm, Patient Position: Sitting, BP Method: Medium (Manual))   Ht 5' 4" (1.626 m)   Wt 66.1 kg (145 lb 11.6 oz)   LMP 03/18/2019   BMI 25.01 kg/m²   General: alert and oriented, no acute distress  Respiratory: normal respiratory effort  Abd: soft, non-tender, non-distended  INC: LSC well-healed; c-shaped skin scar RLQ well-healed and faint    Pelvic  Ext. Genitalia: normal external genitalia. Normal bartholins and skenes glands  Vagina: + min atrophy. Normal vaginal mucosa without lesions. No discharge noted.   Non-tender bladder base without palpable mass. Sling path well-healed, NT, no mesh visualized/palpated.   Cervix: absent  Uterus:  surgically absent, vaginal cuff well healed   Urethra: no masses or tenderness  Urethral meatus: no lesions, caruncle or prolapse.    POP-Q: deferred. NO POP with valsalva.     Impression  1. s/p TLH/BSO, retropubic MUS on 11-        2. Vaginal atrophy        3. Menopausal symptom        4. Breast cancer metastasized to axillary lymph node, right            We reviewed the above issues and discussed options for short-term versus long-term management of her problems.   Plan:   Postop: well-healed. Continue normal activities.    Abdominal scar:   Continue using antibiotic ointment.   Mild vaginal dryness:  Discussed with Dr. Marino.  Going to start Good love.   Treating may help reduce UTIs and bladder urgency/frequency.   Increased night sweats:   Follow up with Dr. Marino.  Always try to minimize straining with BMs. "   She will follow up with us in 11/2023 for 1 year check.   20 minutes were spent in face to face time with this patient  90 % of this time was spent in counseling and/or coordination of care     Sheree Holden MD  [facility]  Division of Female Pelvic Medicine and Reconstructive Surgery  Department of Obstetrics & Gynecology

## 2023-05-08 NOTE — PATIENT INSTRUCTIONS
Bladder Irritants  Certain foods and drinks have been associated with worsening symptoms of urinary frequency, urgency, urge incontinence, or bladder pain. If you suffer from any of these conditions, you may wish to try eliminating one or more of these foods from your diet and see if your symptoms improve. If bladder symptoms are related to dietary factors, strict adherence to a diet thateliminates the food should bring marked relief in 10 days. Once you are feeling better, you can begin to add foods back into your diet, one at a time. If symptoms return, you will be able to identify the irritant. As you add foods back to your diet it is very important that you drink significant amounts of water.    -----------------------------------------------------------------------------------------------  List of Common Bladder Irritants*  Alcoholic beverages  Apples and apple juice  Cantaloupe  Carbonated beverages  Chili and spicy foods  Chocolate  Citrus fruit  Coffee (including decaffeinated)  Cranberries and cranberry juice  Grapes  Guava  Milk Products: milk, cheese, cottage cheese, yogurt, ice cream  Peaches  Pineapple  Plums  Strawberries  Sugar especially artificial sweeteners, saccharin, aspartame, corn sweeteners, honey, fructose, sucrose, lactose  Tea  Tomatoes and tomato juice  Vitamin B complex  Vinegar  *Most people are not sensitive to ALL of these products; your goal is to find the foods that make YOUR symptoms worse.  ---------------------------------------------------------------------------------------------------    Low-acid fruit substitutions include apricots, papaya, pears and watermelon. Coffee drinkers can drink Kava or other lowacid instant drinks. Tea drinkers can substitute non-citrus herbal and sun brewed teas. Calcium carbonate co-buffered with calcium ascorbate can be substituted for Vitamin C. Prelief is a dietary supplement that works as an acid blocker for the bladder.    Where to get more  Subjective:      Gustavo Lentz Jr. is a 24 y.o. male who presents with Emesis (Diarrhea, nausea, chills and back pain - onset last ngitht)            Emesis   This is a new problem. The current episode started yesterday. The problem occurs constantly. The problem has been unchanged. Associated symptoms include abdominal pain. Pertinent negatives include no chest pain, chills, coughing, diaphoresis, fever, headaches, rash or weakness. Associated symptoms comments: Diarrhea  . Nothing aggravates the symptoms. He has tried nothing for the symptoms.       Review of Systems   Constitutional: Positive for malaise/fatigue. Negative for chills, diaphoresis, fever and weight loss.   Respiratory: Negative for cough and shortness of breath.    Cardiovascular: Negative for chest pain and palpitations.   Gastrointestinal: Positive for abdominal pain.   Skin: Negative for itching and rash.   Neurological: Negative for dizziness, weakness and headaches.   All other systems reviewed and are negative.    PMH:  has no past medical history on file.  MEDS:   Current Outpatient Prescriptions:   •  loperamide (IMODIUM A-D) 2 MG tablet, Start: 4 mg (two tabs) by mouth x1, then 2 mg (one tab) by mouth after each loose stool; Max 16 mg per day., Disp: 30 Tab, Rfl: 0  •  ondansetron (ZOFRAN) 4 MG Tab tablet, Take 1 Tab by mouth every four hours as needed for Nausea/Vomiting., Disp: 24 Tab, Rfl: 0  •  amoxicillin (AMOXIL) 500 MG CAPS, Take 1 Cap by mouth 3 times a day., Disp: 30 Cap, Rfl: 0  •  lidocaine viscous 2% (XYLOCAINE) 2 % SOLN, Take 15 mL by mouth 5 Times a Day., Disp: 120 mL, Rfl: 0  •  ondansetron (ZOFRAN) 4 MG TABS, Take 1 Tab by mouth every 8 hours as needed for Nausea/Vomiting., Disp: 20 Each, Rfl: 1  ALLERGIES: No Known Allergies  SURGHX: History reviewed. No pertinent surgical history.  SOCHX:  reports that he has never smoked. He has never used smokeless tobacco. He reports that he does not drink alcohol or use  "drugs.  FH: Family history was reviewed, no pertinent findings to report  Medications, Allergies, and current problem list reviewed today in Epic       Objective:     /66   Pulse 100   Temp 37.1 °C (98.7 °F)   Ht 1.676 m (5' 6\")   Wt 88.9 kg (196 lb)   SpO2 97%   BMI 31.64 kg/m²      Physical Exam   Constitutional: He is oriented to person, place, and time. He appears well-developed and well-nourished. He is active.  Non-toxic appearance. He does not have a sickly appearance. He does not appear ill. No distress.   HENT:   Head: Normocephalic and atraumatic.   Right Ear: External ear normal.   Left Ear: External ear normal.   Nose: Nose normal.   Mouth/Throat: Oropharynx is clear and moist.   Eyes: Conjunctivae, EOM and lids are normal.   Neck: Normal range of motion and full passive range of motion without pain. Neck supple.   Cardiovascular: Normal rate, regular rhythm, S1 normal, S2 normal and normal heart sounds.  Exam reveals no gallop and no friction rub.    No murmur heard.  Pulmonary/Chest: Effort normal and breath sounds normal. No respiratory distress. He has no decreased breath sounds. He has no wheezes. He has no rales. He exhibits no tenderness.   Abdominal: Soft. Normal appearance and bowel sounds are normal. He exhibits no shifting dullness, no distension, no pulsatile liver, no fluid wave, no abdominal bruit, no ascites, no pulsatile midline mass and no mass. There is generalized tenderness. There is no rigidity, no rebound, no guarding, no CVA tenderness, no tenderness at McBurney's point and negative Fernandez's sign. No hernia.   Musculoskeletal: Normal range of motion. He exhibits no edema, tenderness or deformity.   Neurological: He is alert and oriented to person, place, and time.   Skin: Skin is warm, dry and intact. He is not diaphoretic.   Psychiatric: He has a normal mood and affect. His speech is normal and behavior is normal. Judgment and thought content normal. Cognition and " information:        Overcoming Bladder Disorders by Mary Ellen Godoy and Roseline Jurado, 1990        You Dont Have to Live with Cystitis! By Shavon Pickering, 1988  [email or web address]  -----------------------------------------------------------  Postop: well-healed. Continue normal activities.    Abdominal scar:   Continue using antibiotic ointment.   Mild vaginal dryness:  Discussed with Dr. Marino.  Going to start Good love.   Treating may help reduce UTIs and bladder urgency/frequency.   Increased night sweats:   Follow up with Dr. Marino.  Always try to minimize straining with BMs.   She will follow up with us in 11/2023 for 1 year check.      memory are normal.   Vitals reviewed.              Assessment/Plan:   Patient is a 24-year-old male who presents with vomiting, diarrhea, abdominal pain for 2 days. He is having frequent watery bowel movements and vomiting. He is able to hold down fluids. Vital signs normal. Abdominal exam shows generalized tenderness without rebound. He also likely is experiencing gastroenteritis. We discussed emergency room precautions.  1. Gastroenteritis    - loperamide (IMODIUM A-D) 2 MG tablet; Start: 4 mg (two tabs) by mouth x1, then 2 mg (one tab) by mouth after each loose stool; Max 16 mg per day.  Dispense: 30 Tab; Refill: 0  - ondansetron (ZOFRAN) 4 MG Tab tablet; Take 1 Tab by mouth every four hours as needed for Nausea/Vomiting.  Dispense: 24 Tab; Refill: 0    2. Obesity (BMI 30-39.9)    - Patient identified as having weight management issue.  Appropriate orders and counseling given.    Differential diagnosis, natural history, supportive care discussed. Follow-up with primary care provider within 7-10 days, emergency room precautions discussed.  Patient and/or family appears understanding of information.  Handout and review of patients diagnosis and treatment was discussed extensively.

## 2023-05-23 ENCOUNTER — OFFICE VISIT (OUTPATIENT)
Dept: INFECTIOUS DISEASES | Facility: CLINIC | Age: 58
End: 2023-05-23

## 2023-05-23 ENCOUNTER — CLINICAL SUPPORT (OUTPATIENT)
Dept: INFECTIOUS DISEASES | Facility: CLINIC | Age: 58
End: 2023-05-23

## 2023-05-23 VITALS
HEART RATE: 71 BPM | BODY MASS INDEX: 25.06 KG/M2 | WEIGHT: 146.81 LBS | SYSTOLIC BLOOD PRESSURE: 137 MMHG | DIASTOLIC BLOOD PRESSURE: 90 MMHG | HEIGHT: 64 IN | TEMPERATURE: 98 F

## 2023-05-23 DIAGNOSIS — Z71.84 TRAVEL ADVICE ENCOUNTER: ICD-10-CM

## 2023-05-23 PROCEDURE — 90472 YELLOW FEVER VACCINE SQ: ICD-10-PCS | Mod: S$GLB,,, | Performed by: INTERNAL MEDICINE

## 2023-05-23 PROCEDURE — 99999 PR PBB SHADOW E&M-EST. PATIENT-LVL I: ICD-10-PCS | Mod: PBBFAC,,,

## 2023-05-23 PROCEDURE — 99999 PR PBB SHADOW E&M-EST. PATIENT-LVL I: CPT | Mod: PBBFAC,,,

## 2023-05-23 PROCEDURE — 99402 PREV MED CNSL INDIV APPRX 30: CPT | Mod: S$GLB,,, | Performed by: REGISTERED NURSE

## 2023-05-23 PROCEDURE — 90471 TYPHOID VICPS VACCINE IM: ICD-10-PCS | Mod: S$GLB,,, | Performed by: INTERNAL MEDICINE

## 2023-05-23 PROCEDURE — 90471 IMMUNIZATION ADMIN: CPT | Mod: S$GLB,,, | Performed by: INTERNAL MEDICINE

## 2023-05-23 PROCEDURE — 99999 PR PBB SHADOW E&M-EST. PATIENT-LVL IV: ICD-10-PCS | Mod: PBBFAC,,, | Performed by: REGISTERED NURSE

## 2023-05-23 PROCEDURE — 99402 PR PREVENT COUNSEL,INDIV,30 MIN: ICD-10-PCS | Mod: S$GLB,,, | Performed by: REGISTERED NURSE

## 2023-05-23 PROCEDURE — 90717 YELLOW FEVER VACCINE SUBQ: CPT | Mod: S$GLB,,, | Performed by: INTERNAL MEDICINE

## 2023-05-23 PROCEDURE — 99999 PR PBB SHADOW E&M-EST. PATIENT-LVL IV: CPT | Mod: PBBFAC,,, | Performed by: REGISTERED NURSE

## 2023-05-23 PROCEDURE — 90472 IMMUNIZATION ADMIN EACH ADD: CPT | Mod: S$GLB,,, | Performed by: INTERNAL MEDICINE

## 2023-05-23 PROCEDURE — 90691 TYPHOID VACCINE IM: CPT | Mod: S$GLB,,, | Performed by: INTERNAL MEDICINE

## 2023-05-23 PROCEDURE — 90691 TYPHOID VICPS VACCINE IM: ICD-10-PCS | Mod: S$GLB,,, | Performed by: INTERNAL MEDICINE

## 2023-05-23 PROCEDURE — 90717 YELLOW FEVER VACCINE SQ: ICD-10-PCS | Mod: S$GLB,,, | Performed by: INTERNAL MEDICINE

## 2023-05-23 RX ORDER — ACETAZOLAMIDE 125 MG/1
125 TABLET ORAL 2 TIMES DAILY
Status: SHIPPED | OUTPATIENT
Start: 2023-07-20 | End: 2023-07-27

## 2023-05-23 RX ORDER — AZITHROMYCIN 500 MG/1
500 TABLET, FILM COATED ORAL DAILY
Qty: 3 TABLET | Refills: 0 | Status: SHIPPED | OUTPATIENT
Start: 2023-05-23 | End: 2024-02-08

## 2023-05-23 NOTE — PROGRESS NOTES
Patient received the yellow fever vaccine in the left arm and the Typhoid vaccine in the left deltoid. Pt tolerated well. Pt asked to wait in the clinic 15 minutes after injection in the event of an allergic reaction. Pt verbalized understanding. Pt left in NAD.

## 2023-05-23 NOTE — PROGRESS NOTES
Travel Consult    No chief complaint on file.      Corinne Morrison Marcus presents today for pretravel consultation.  The patient will be traveling to  [place] from July 14th to August 2nd. She will be traveling to [place], [place], [place], and [place]. She plans to go to:     July 15th-21st: [place] and [place], [place]  July 21st-26th - [place] and [place], [place]  July 26th-28th- [place], [place]  July 28th-Aug 2nd- [facility], [place]    The patient will be in [place] for 19 days. The purpose of this trip is vacation. The patient will be lodging at a hotels. The has travelled to the following other countries in the past; [place], [place].  The patient reports receipt of the following travel related vaccinations; NA.  Patient denies recent fevers, chills or infectious illnesses. Denies hx of splenctomy. No history of immunosuppression. Pt was recently treated for breast cancer in 2022, denies chemotherapy/radiation, states she had a double mastectomy. Reports she takes letrozole but denies immunosuppression. Reports she spoke with her oncologist,  Dr. Holden, and she said it was okay for her to get the yellow fever vaccine with recent breast cancer history.     Areas in country: rural and urban    Accommodations: hotel  Purpose of travel: vacation        Past Medical History:   Diagnosis Date    Abnormal Pap smear of cervix     Abnormal Pap smear of vagina     Anxiety     Breast cancer 06/08/2022    Cancer     Constipation     Depression     Hemorrhoids     HLD (hyperlipidemia)     Lung nodule     Multinodular goiter 2010    s/p bx of 2 nodules in 2011 - benign    PONV (postoperative nausea and vomiting)        Review of patient's allergies indicates:  No Known Allergies    Immunization History   Administered Date(s) Administered    COVID-19, MRNA, LN-S, PF (Pfizer) (Purple Cap) 01/29/2021, 02/25/2021    Influenza 02/09/2012    Influenza - Quadrivalent 11/02/2020, 11/02/2020    Influenza -  Quadrivalent - MDCK - PF 12/15/2017    Influenza - Quadrivalent - PF *Preferred* (6 months and older) 02/09/2012, 10/26/2021    Influenza A (H1N1) 2009 Monovalent - Intranasal 01/21/2010    Pneumococcal Polysaccharide - 23 Valent 03/11/2021    Tdap 10/26/2021    Zoster Recombinant 03/11/2021, 05/14/2021       ASSESSMENT: Pre-Travel clinic assessment    PLAN:  The Patient was provided with an extensive travel guidance packet which provides travel information specific to the patients itinerary.     The patient's immunization history was reviewed and, based on the patient's itinerary, immunizations were ordered.     -Infectious Disease risks:       Mosquito Borne pathogens:  Reviewed basic mosquito avoidance precautions including wearing long sleeve clothing and insect repellant. The patient was instructed to purchase insect repellent containing DEET or [place] and apply according to repellent label instructions. This patient needs no Malaria prophylaxis for this itinerary. Counseled patient on Zika precautions and to abstain or practice safe sex for a period of 3 months (males) and 8 weeks (females) upon return. Yellow fever vaccine was given today.         Food Borne pathogens:  Reviewed basic hand, food and water sanitation precautions.  Patient instructed to take hand  on their trip. Prescription for Hepatitis A was given today.  Typhoid fever vaccine was given today. The patient was instructed to purchase Imodium over the counter to take in case diarrhea (without blood or fever) develops. Azithromycin was ordered for treatment if severe or bloody diarrhea develops and the patient was instructed on use and possible side effects.      Altitude sickness prevention: Pt reports she will be traveling to [facility] and [facility] with an elevation of >11,000 meters. Pt states they will be unable to slowly ascend in [place] d/t safety concerns. Acetazolamide was ordered for prevention. Pt was instructed to start day  before ascent and continue 2 to 3 days at maximum altitude; instructed that she may use once at night thereafter to improve sleep.        Blood Borne Pathogens: Patient has/has not received the hepatitis B vaccination series.       Routine:  Up to date on Tdap/Influenza/COVID-19 vaccine    Environmental risks:   The patient's medical history was reviewed and the patient was counseled on:  Precautions to minimize risk/exposure to crime and motor vehicle accidents  Precautions against development of DVT during flight  Precautions to prevent exposure to rabies and seek treatment for possible exposures  Precautions against sun exposure  Personal and travel safety  Dietary precautions    The patient was instructed to contact us if problems develop after travel.    I have spent 30 minutes with the patient, all of which was face to face with greater than 50% spent counseling.

## 2023-06-06 ENCOUNTER — PATIENT MESSAGE (OUTPATIENT)
Dept: INTERNAL MEDICINE | Facility: CLINIC | Age: 58
End: 2023-06-06
Payer: COMMERCIAL

## 2023-06-06 DIAGNOSIS — R11.0 NAUSEA: ICD-10-CM

## 2023-06-06 RX ORDER — ONDANSETRON 8 MG/1
8 TABLET, ORALLY DISINTEGRATING ORAL EVERY 6 HOURS PRN
Qty: 15 TABLET | Refills: 1 | Status: SHIPPED | OUTPATIENT
Start: 2023-06-06 | End: 2024-02-08 | Stop reason: SDUPTHER

## 2023-06-06 NOTE — TELEPHONE ENCOUNTER
No care due was identified.  [facility] Embedded Care Due Messages. Reference number: [id number].   6/06/2023 1:46:12 PM CDT

## 2023-06-08 ENCOUNTER — TELEPHONE (OUTPATIENT)
Dept: HEMATOLOGY/ONCOLOGY | Facility: CLINIC | Age: 58
End: 2023-06-08
Payer: COMMERCIAL

## 2023-06-08 NOTE — TELEPHONE ENCOUNTER
Messaged pt with updated schedule for Aug    ----- Message from Tremontana Chevalier sent at 6/8/2023  3:54 PM CDT -----  Regarding: missed call  Returning a Missed Call    Caller: self      Returning call to: Es[place]       Caller can be reached @: pls confirm by phone or portal at [phone]    Nature of the call: pt sys will be out of town on the date that was offered, is there anything avail 08/07? Pt says prefers afternoons.

## 2023-08-09 ENCOUNTER — PATIENT MESSAGE (OUTPATIENT)
Dept: HEMATOLOGY/ONCOLOGY | Facility: CLINIC | Age: 58
End: 2023-08-09
Payer: COMMERCIAL

## 2023-08-14 ENCOUNTER — OFFICE VISIT (OUTPATIENT)
Dept: HEMATOLOGY/ONCOLOGY | Facility: CLINIC | Age: 58
End: 2023-08-14
Payer: COMMERCIAL

## 2023-08-14 ENCOUNTER — LAB VISIT (OUTPATIENT)
Dept: LAB | Facility: HOSPITAL | Age: 58
End: 2023-08-14
Attending: NURSE PRACTITIONER
Payer: COMMERCIAL

## 2023-08-14 VITALS
HEART RATE: 81 BPM | OXYGEN SATURATION: 98 % | BODY MASS INDEX: 25.06 KG/M2 | SYSTOLIC BLOOD PRESSURE: 153 MMHG | HEIGHT: 64 IN | TEMPERATURE: 98 F | WEIGHT: 146.81 LBS | RESPIRATION RATE: 18 BRPM | DIASTOLIC BLOOD PRESSURE: 79 MMHG

## 2023-08-14 DIAGNOSIS — E83.52 HYPERCALCEMIA: ICD-10-CM

## 2023-08-14 DIAGNOSIS — C50.911 BREAST CANCER METASTASIZED TO AXILLARY LYMPH NODE, RIGHT: Primary | ICD-10-CM

## 2023-08-14 DIAGNOSIS — Z79.811 AROMATASE INHIBITOR USE: ICD-10-CM

## 2023-08-14 DIAGNOSIS — T45.1X5A AROMATASE INHIBITOR-ASSOCIATED ARTHRALGIA: ICD-10-CM

## 2023-08-14 DIAGNOSIS — C50.911 BREAST CANCER METASTASIZED TO AXILLARY LYMPH NODE, RIGHT: ICD-10-CM

## 2023-08-14 DIAGNOSIS — K21.9 GASTROESOPHAGEAL REFLUX DISEASE WITHOUT ESOPHAGITIS: ICD-10-CM

## 2023-08-14 DIAGNOSIS — E55.9 VITAMIN D DEFICIENCY: ICD-10-CM

## 2023-08-14 DIAGNOSIS — M25.50 AROMATASE INHIBITOR-ASSOCIATED ARTHRALGIA: ICD-10-CM

## 2023-08-14 DIAGNOSIS — Z90.710 S/P LAPAROSCOPIC HYSTERECTOMY: ICD-10-CM

## 2023-08-14 DIAGNOSIS — C77.3 BREAST CANCER METASTASIZED TO AXILLARY LYMPH NODE, RIGHT: Primary | ICD-10-CM

## 2023-08-14 DIAGNOSIS — C77.3 BREAST CANCER METASTASIZED TO AXILLARY LYMPH NODE, RIGHT: ICD-10-CM

## 2023-08-14 LAB
ALBUMIN SERPL BCP-MCNC: 4.2 G/DL (ref 3.5–5.2)
ALP SERPL-CCNC: 106 U/L (ref 55–135)
ALT SERPL W/O P-5'-P-CCNC: 27 U/L (ref 10–44)
ANION GAP SERPL CALC-SCNC: 11 MMOL/L (ref 8–16)
AST SERPL-CCNC: 28 U/L (ref 10–40)
BILIRUB SERPL-MCNC: 0.6 MG/DL (ref 0.1–1)
BUN SERPL-MCNC: 12 MG/DL (ref 6–20)
CALCIUM SERPL-MCNC: 10.9 MG/DL (ref 8.7–10.5)
CHLORIDE SERPL-SCNC: 104 MMOL/L (ref 95–110)
CO2 SERPL-SCNC: 24 MMOL/L (ref 23–29)
CREAT SERPL-MCNC: 0.8 MG/DL (ref 0.5–1.4)
ERYTHROCYTE [DISTWIDTH] IN BLOOD BY AUTOMATED COUNT: 13.2 % (ref 11.5–14.5)
EST. GFR  (NO RACE VARIABLE): >60 ML/MIN/1.73 M^2
GLUCOSE SERPL-MCNC: 86 MG/DL (ref 70–110)
HCT VFR BLD AUTO: 42.6 % (ref 37–48.5)
HGB BLD-MCNC: 13.9 G/DL (ref 12–16)
IMM GRANULOCYTES # BLD AUTO: 0.02 K/UL (ref 0–0.04)
MCH RBC QN AUTO: 29.3 PG (ref 27–31)
MCHC RBC AUTO-ENTMCNC: 32.6 G/DL (ref 32–36)
MCV RBC AUTO: 90 FL (ref 82–98)
NEUTROPHILS # BLD AUTO: 5.2 K/UL (ref 1.8–7.7)
PLATELET # BLD AUTO: 350 K/UL (ref 150–450)
PMV BLD AUTO: 8.6 FL (ref 9.2–12.9)
POTASSIUM SERPL-SCNC: 4.3 MMOL/L (ref 3.5–5.1)
PROT SERPL-MCNC: 8.3 G/DL (ref 6–8.4)
RBC # BLD AUTO: 4.74 M/UL (ref 4–5.4)
SODIUM SERPL-SCNC: 139 MMOL/L (ref 136–145)
WBC # BLD AUTO: 8.05 K/UL (ref 3.9–12.7)

## 2023-08-14 PROCEDURE — 99999 PR PBB SHADOW E&M-EST. PATIENT-LVL IV: ICD-10-PCS | Mod: PBBFAC,,, | Performed by: NURSE PRACTITIONER

## 2023-08-14 PROCEDURE — 3078F PR MOST RECENT DIASTOLIC BLOOD PRESSURE < 80 MM HG: ICD-10-PCS | Mod: CPTII,S$GLB,, | Performed by: NURSE PRACTITIONER

## 2023-08-14 PROCEDURE — 99214 OFFICE O/P EST MOD 30 MIN: CPT | Mod: S$GLB,,, | Performed by: NURSE PRACTITIONER

## 2023-08-14 PROCEDURE — 36415 COLL VENOUS BLD VENIPUNCTURE: CPT | Performed by: NURSE PRACTITIONER

## 2023-08-14 PROCEDURE — 1159F PR MEDICATION LIST DOCUMENTED IN MEDICAL RECORD: ICD-10-PCS | Mod: CPTII,S$GLB,, | Performed by: NURSE PRACTITIONER

## 2023-08-14 PROCEDURE — 3008F PR BODY MASS INDEX (BMI) DOCUMENTED: ICD-10-PCS | Mod: CPTII,S$GLB,, | Performed by: NURSE PRACTITIONER

## 2023-08-14 PROCEDURE — 3008F BODY MASS INDEX DOCD: CPT | Mod: CPTII,S$GLB,, | Performed by: NURSE PRACTITIONER

## 2023-08-14 PROCEDURE — 85027 COMPLETE CBC AUTOMATED: CPT | Performed by: NURSE PRACTITIONER

## 2023-08-14 PROCEDURE — 99999 PR PBB SHADOW E&M-EST. PATIENT-LVL IV: CPT | Mod: PBBFAC,,, | Performed by: NURSE PRACTITIONER

## 2023-08-14 PROCEDURE — 1159F MED LIST DOCD IN RCRD: CPT | Mod: CPTII,S$GLB,, | Performed by: NURSE PRACTITIONER

## 2023-08-14 PROCEDURE — 99214 PR OFFICE/OUTPT VISIT, EST, LEVL IV, 30-39 MIN: ICD-10-PCS | Mod: S$GLB,,, | Performed by: NURSE PRACTITIONER

## 2023-08-14 PROCEDURE — 3077F PR MOST RECENT SYSTOLIC BLOOD PRESSURE >= 140 MM HG: ICD-10-PCS | Mod: CPTII,S$GLB,, | Performed by: NURSE PRACTITIONER

## 2023-08-14 PROCEDURE — 3078F DIAST BP <80 MM HG: CPT | Mod: CPTII,S$GLB,, | Performed by: NURSE PRACTITIONER

## 2023-08-14 PROCEDURE — 3077F SYST BP >= 140 MM HG: CPT | Mod: CPTII,S$GLB,, | Performed by: NURSE PRACTITIONER

## 2023-08-14 PROCEDURE — 80053 COMPREHEN METABOLIC PANEL: CPT | Performed by: NURSE PRACTITIONER

## 2023-08-14 NOTE — PROGRESS NOTES
Subjective:       Patient ID: Corinne Morrison Marcus is a 57 y.o. female.    Chief Complaint: Breast cancer metastasized to axillary lymph node, right    HPI    Returns for follow up  Overall doing well  Ankle pain in AM- better once moves around. She does not want to switch or try holding.   Forgot her Prilosec yesterday and had severe heartburn.   Taking every morning since her surgery.    No other pain issues.   Appetite good and weight stable  No CP/SOB  No fever/chills.   No swelling.   Taking Vit D      Previously,  underwent OSMEL/BSO- details below  Date of Operation: 11/21/2022  Title of Operation:  1)  Total laparoscopic hysterectomy with bilateral salpingo-oophorectomy  2)  Laparoscopic bilateral uterosacral vaginal vault suspension  3)  Placement of retropubic tension-free midurethral sling, Advantage Fit ([employer])  4)  Cystourethroscopy  Recovery from hysterectomy was harder than she anticipated  Notes a blister- has topical antibiotic ointment and improving    Pathology:  UTERUS, CERVIX AND BILATERAL ADNEXA WEIGHING 103 G SHOWING:   INACTIVE ENDOMETRIUM WITH A SMALL BENIGN ENDOMETRIAL POLYP   THE CERVIX HAS NABOTHIAN CYSTS   SMALL INTRAMURAL AND SUBMUCOSAL LEIOMYOMAS ARE PRESENT   UNREMARKABLE LEFT AND RIGHT OVARIES   UNREMARKABLE LEFT AND RIGHT FALLOPIAN TUBES       Recent Imaging:  - 12/5/2022 PET scan:  FINDINGS:  Quality of the study: Adequate.  In the head and neck, there are no hypermetabolic lesions worrisome for malignancy. There are no hypermetabolic mucosal lesions, and there are no pathologically enlarged or hypermetabolic lymph nodes.  There is prominent physiologic uptake in the right masseter and bilateral pterygoid muscles and less prominent uptake in the right temporalis muscle.  In the chest, there are no hypermetabolic lesions worrisome for malignancy.  There are no concerning pulmonary nodules or masses, and there are no pathologically enlarged or hypermetabolic lymph nodes.   There are postsurgical changes of bilateral mammoplasty and right axillary lymph node dissection without suspicious hypermetabolic or mass lesions.  In the abdomen and pelvis, there is physiologic tracer distribution within the abdominal organs and excretion into the genitourinary system.  In the bones, there are no hypermetabolic lesions worrisome for malignancy.  In the extremities, there are no hypermetabolic lesions worrisome for malignancy.  Focal retention of tracer near the left antecubital injection site.  Impression:  1.  No evidence of hypermetabolic tumor.  2.  Postsurgical changes of the breasts and right axilla.    - 11/17/2022 Dexa scan:  Normal bone mineral density.    - 10/16/2022 Transvaginal Ultrasound:  No sonographic abnormality.  No detrimental change from prior.    Oncology History:  Patient is a  56-year-old female w/ hx of HLD, depression, who underwent bilateral breast reduction due to macromastia.  Incidentally patient was found to have right breast invasive carcinoma during resection. Subsequently underwent b/l mastectomy with reconstruction + R SLNB and R ALND on 6/17/22.      Prior screening mammograms w/o mass.   MRI breast without any obvious masses or lymph node involvement.     Tumor size:  1.8  Tumor type:  Right breast invasive carcinoma  Tumor stgstrstastdstest:st st1st Lymph node status: 1/3 sentinel LN   Hormone status:  ER +, UT +  HER2 Kendra status: -ve  Other:  Rare focus of lymphovascular invasion     SH: Working as a laywer. . Three boys and two girls - 25, 23, 21, 19, 14.  Gyn Hx: Menses 15 y/o, post-menopausal 54 y/o  FH:  had metastatic melanoma, Maternal grandmother - breast ca 71 y/o  Paternal aunt - breast ca 49 y/o          Labs confirmed post menopausal status  Revision surgery 8/2022 - did well.   Started letrozole mid 9/2022    - [employer] genetics testing negative    Review of Systems   Constitutional:         See above   All other systems reviewed and are  negative.        Objective:      Physical Exam  Vitals and nursing note reviewed.   Constitutional:       General: She is not in acute distress.     Appearance: Normal appearance. She is well-developed and normal weight.      Comments: Presents alone  Very pleasant   HENT:      Head: Normocephalic and atraumatic.   Eyes:      General: Lids are normal. No scleral icterus.     Conjunctiva/sclera: Conjunctivae normal.      Pupils: Pupils are equal, round, and reactive to light.   Neck:      Thyroid: No thyromegaly.      Vascular: No JVD.      Trachea: Trachea normal.   Cardiovascular:      Rate and Rhythm: Normal rate and regular rhythm.      Heart sounds: Normal heart sounds.   Pulmonary:      Effort: Pulmonary effort is normal. No respiratory distress.      Breath sounds: Normal breath sounds. No wheezing, rhonchi or rales.      Comments: Reconstructed breasts  No masses or lymphadenopathy  Abdominal:      General: Bowel sounds are normal. There is no distension.      Palpations: Abdomen is soft. There is no mass.      Tenderness: There is no abdominal tenderness. There is no rebound.      Comments: No organomegaly.   Recent surgical site healed well.   Musculoskeletal:         General: No swelling, tenderness or deformity. Normal range of motion.      Cervical back: Normal range of motion and neck supple.      Right lower leg: No edema.      Left lower leg: No edema.      Comments: No spinal or paraspinal tenderness.    Lymphadenopathy:      Head:      Right side of head: No submental or submandibular adenopathy.      Left side of head: No submental or submandibular adenopathy.      Cervical: No cervical adenopathy.      Upper Body:      Right upper body: No supraclavicular or axillary adenopathy.      Left upper body: No supraclavicular or axillary adenopathy.   Skin:     General: Skin is warm and dry.      Capillary Refill: Capillary refill takes less than 2 seconds.      Coloration: Skin is not jaundiced or pale.       Findings: No bruising, erythema or rash.      Nails: There is no clubbing.   Neurological:      General: No focal deficit present.      Mental Status: She is alert and oriented to person, place, and time.      Motor: No weakness.      Coordination: Coordination normal.      Gait: Gait normal.   Psychiatric:         Mood and Affect: Mood normal.         Speech: Speech normal.         Behavior: Behavior normal.         Thought Content: Thought content normal.         Judgment: Judgment normal.         Labs- reviewed  Imaging- reviewed    Assessment:       Problem List Items Addressed This Visit          Renal/    S/p TLH/BSO/USLS/MUS/Cystoscopy 11/21/22       Oncology    Breast cancer metastasized to axillary lymph node, right - Primary     Other Visit Diagnoses       Aromatase inhibitor use        Aromatase inhibitor-associated arthralgia        Vitamin D deficiency        Hypercalcemia        Gastroesophageal reflux disease without esophagitis        Relevant Orders    Ambulatory referral/consult to Gastroenterology              Plan:         Overall doing well and clinically CHUY  Continue Letrozole.   May try glucosamine for joint pain.   Continue Vit D   Encouraged to hydrate and recheck calcium next week.   Stop any oral calcium supplements.   BMD 11/2023  Weight bearing exercising.   Refer to GI for GERD  Colonoscopy 2031  Message to Sarah for tattoo info    RTC 3 months- knows to call for any issues    Route Chart for Scheduling    Med Onc Chart Routing  Urgent    Follow up with physician 3 months.   Follow up with [facility]    Infusion scheduling note    Injection scheduling note    Labs CBC and CMP   Scheduling:  Preferred lab:  Lab interval:  *CBC next monday please   Imaging    Pharmacy appointment    Other referrals     Additional referrals needed  GI                    Patient is in agreement with the proposed treatment plan. All questions were answered to the patient's satisfaction. Pt knows to call  clinic for any new or worsening symptoms and if anything is needed before the next clinic visit.    Jorge Ely, MSN, APRN, FNP-C  Nurse Practitioner to Dr. Nelia Frazier  Lead [facility]  Lead [facility]  Hematology & Medical Oncology  [address] [place] [id number]  ph. [phone] ext [phone]  Fax. [phone]              30 minutes of total time spent on the encounter, which includes face to face time and non-face to face time preparing to see the patient (eg, review of tests), Obtaining and/or reviewing separately obtained history, Documenting clinical information in the electronic or other health record, Independently interpreting results (not separately reported) and communicating results to the patient/family/caregiver, or Care coordination (not separately reported).

## 2023-08-15 ENCOUNTER — TELEPHONE (OUTPATIENT)
Dept: SURGERY | Facility: CLINIC | Age: 58
End: 2023-08-15
Payer: COMMERCIAL

## 2023-08-15 NOTE — TELEPHONE ENCOUNTER
----- Message from Sarah Garcia PA-C sent at 8/15/2023  4:13 PM CDT -----    ----- Message -----  From: Jorge Ely NP  Sent: 8/14/2023   1:21 PM CDT  To: Sarah Garcia PA-C    HI! Can you get her info for nipple tattooing? thanks

## 2023-08-22 ENCOUNTER — LAB VISIT (OUTPATIENT)
Dept: LAB | Facility: HOSPITAL | Age: 58
End: 2023-08-22
Attending: INTERNAL MEDICINE
Payer: COMMERCIAL

## 2023-08-22 DIAGNOSIS — Z17.0 MALIGNANT NEOPLASM OF RIGHT BREAST IN FEMALE, ESTROGEN RECEPTOR POSITIVE, UNSPECIFIED SITE OF BREAST: ICD-10-CM

## 2023-08-22 DIAGNOSIS — C50.911 MALIGNANT NEOPLASM OF RIGHT BREAST IN FEMALE, ESTROGEN RECEPTOR POSITIVE, UNSPECIFIED SITE OF BREAST: ICD-10-CM

## 2023-08-22 LAB
BASOPHILS # BLD AUTO: 0.05 K/UL (ref 0–0.2)
BASOPHILS NFR BLD: 0.7 % (ref 0–1.9)
DIFFERENTIAL METHOD: ABNORMAL
EOSINOPHIL # BLD AUTO: 0.1 K/UL (ref 0–0.5)
EOSINOPHIL NFR BLD: 1.2 % (ref 0–8)
ERYTHROCYTE [DISTWIDTH] IN BLOOD BY AUTOMATED COUNT: 13.1 % (ref 11.5–14.5)
HCT VFR BLD AUTO: 38.1 % (ref 37–48.5)
HGB BLD-MCNC: 12.7 G/DL (ref 12–16)
IMM GRANULOCYTES # BLD AUTO: 0.03 K/UL (ref 0–0.04)
IMM GRANULOCYTES NFR BLD AUTO: 0.4 % (ref 0–0.5)
LYMPHOCYTES # BLD AUTO: 1.8 K/UL (ref 1–4.8)
LYMPHOCYTES NFR BLD: 23.2 % (ref 18–48)
MCH RBC QN AUTO: 29.8 PG (ref 27–31)
MCHC RBC AUTO-ENTMCNC: 33.3 G/DL (ref 32–36)
MCV RBC AUTO: 89 FL (ref 82–98)
MONOCYTES # BLD AUTO: 0.8 K/UL (ref 0.3–1)
MONOCYTES NFR BLD: 10.9 % (ref 4–15)
NEUTROPHILS # BLD AUTO: 4.9 K/UL (ref 1.8–7.7)
NEUTROPHILS NFR BLD: 63.6 % (ref 38–73)
NRBC BLD-RTO: 0 /100 WBC
PLATELET # BLD AUTO: 356 K/UL (ref 150–450)
PMV BLD AUTO: 9 FL (ref 9.2–12.9)
RBC # BLD AUTO: 4.26 M/UL (ref 4–5.4)
WBC # BLD AUTO: 7.63 K/UL (ref 3.9–12.7)

## 2023-08-22 PROCEDURE — 36415 COLL VENOUS BLD VENIPUNCTURE: CPT | Performed by: INTERNAL MEDICINE

## 2023-08-22 PROCEDURE — 85025 COMPLETE CBC W/AUTO DIFF WBC: CPT | Performed by: INTERNAL MEDICINE

## 2023-09-06 ENCOUNTER — PATIENT MESSAGE (OUTPATIENT)
Dept: HEMATOLOGY/ONCOLOGY | Facility: CLINIC | Age: 58
End: 2023-09-06
Payer: COMMERCIAL

## 2023-09-07 DIAGNOSIS — C50.911 BREAST CANCER METASTASIZED TO AXILLARY LYMPH NODE, RIGHT: Primary | ICD-10-CM

## 2023-09-07 DIAGNOSIS — C77.3 BREAST CANCER METASTASIZED TO AXILLARY LYMPH NODE, RIGHT: Primary | ICD-10-CM

## 2023-09-07 RX ORDER — LETROZOLE 2.5 MG/1
2.5 TABLET, FILM COATED ORAL DAILY
Qty: 10 TABLET | Refills: 0 | Status: SHIPPED | OUTPATIENT
Start: 2023-09-07 | End: 2024-02-08 | Stop reason: SDUPTHER

## 2023-09-19 ENCOUNTER — TELEPHONE (OUTPATIENT)
Dept: HEMATOLOGY/ONCOLOGY | Facility: CLINIC | Age: 58
End: 2023-09-19
Payer: COMMERCIAL

## 2023-09-20 ENCOUNTER — PATIENT MESSAGE (OUTPATIENT)
Dept: INTERNAL MEDICINE | Facility: CLINIC | Age: 58
End: 2023-09-20
Payer: COMMERCIAL

## 2023-09-20 DIAGNOSIS — G47.00 INSOMNIA, UNSPECIFIED TYPE: ICD-10-CM

## 2023-09-20 RX ORDER — TRAZODONE HYDROCHLORIDE 50 MG/1
TABLET ORAL
Refills: 0 | OUTPATIENT
Start: 2023-09-20

## 2023-09-20 RX ORDER — TRAZODONE HYDROCHLORIDE 50 MG/1
50 TABLET ORAL NIGHTLY
Qty: 90 TABLET | Refills: 3 | Status: CANCELLED | OUTPATIENT
Start: 2023-09-20

## 2023-09-20 NOTE — TELEPHONE ENCOUNTER
No care due was identified.  [facility] Embedded Care Due Messages. Reference number: [id number].   9/20/2023 4:53:40 PM CDT

## 2023-09-20 NOTE — TELEPHONE ENCOUNTER
No care due was identified.  [facility] Embedded Care Due Messages. Reference number: [id number].   9/20/2023 1:10:39 PM CDT

## 2023-09-20 NOTE — TELEPHONE ENCOUNTER
Refill Decision Note   Corinne Marcus  is requesting a refill authorization.  Brief Assessment and Rationale for Refill:  Quick Discontinue     Medication Therapy Plan:    Pharmacy is requesting new scripts for the following medications without required information, (sig/ frequency/qty/etc)      Medication Reconciliation Completed: No     Comments: Pharmacies have been requesting medications for patients without required information, (sig, frequency, qty, etc.). In addition, requests are sent for medication(s) pt. are currently not taking, and medications patients have never taken.    We have spoken to the pharmacies about these request types and advised their teams previously that we are unable to assess these New Script requests and require all details for these requests. This is a known issue and has been reported.     Note composed:2:45 PM 09/20/2023

## 2023-09-21 RX ORDER — TRAZODONE HYDROCHLORIDE 50 MG/1
50 TABLET ORAL NIGHTLY
Qty: 90 TABLET | Refills: 3 | Status: SHIPPED | OUTPATIENT
Start: 2023-09-21 | End: 2023-09-21

## 2023-09-21 RX ORDER — TRAZODONE HYDROCHLORIDE 50 MG/1
100 TABLET ORAL NIGHTLY
Qty: 180 TABLET | Refills: 3 | Status: SHIPPED | OUTPATIENT
Start: 2023-09-21 | End: 2024-01-29 | Stop reason: SDUPTHER

## 2023-09-21 NOTE — TELEPHONE ENCOUNTER
No care due was identified.  [facility] Embedded Care Due Messages. Reference number: [id number].   9/21/2023 8:06:30 AM CDT

## 2023-11-15 ENCOUNTER — PATIENT MESSAGE (OUTPATIENT)
Dept: INTERNAL MEDICINE | Facility: CLINIC | Age: 58
End: 2023-11-15
Payer: COMMERCIAL

## 2023-11-27 ENCOUNTER — OFFICE VISIT (OUTPATIENT)
Dept: HEMATOLOGY/ONCOLOGY | Facility: CLINIC | Age: 58
End: 2023-11-27
Payer: COMMERCIAL

## 2023-11-27 VITALS
OXYGEN SATURATION: 99 % | DIASTOLIC BLOOD PRESSURE: 87 MMHG | SYSTOLIC BLOOD PRESSURE: 150 MMHG | WEIGHT: 145.5 LBS | BODY MASS INDEX: 24.84 KG/M2 | RESPIRATION RATE: 17 BRPM | HEART RATE: 81 BPM | HEIGHT: 64 IN

## 2023-11-27 DIAGNOSIS — R07.9 CHEST PAIN, UNSPECIFIED TYPE: ICD-10-CM

## 2023-11-27 DIAGNOSIS — C77.3 BREAST CANCER METASTASIZED TO AXILLARY LYMPH NODE, RIGHT: Primary | ICD-10-CM

## 2023-11-27 DIAGNOSIS — C50.911 BREAST CANCER METASTASIZED TO AXILLARY LYMPH NODE, RIGHT: Primary | ICD-10-CM

## 2023-11-27 PROCEDURE — 1160F PR REVIEW ALL MEDS BY PRESCRIBER/CLIN PHARMACIST DOCUMENTED: ICD-10-PCS | Mod: CPTII,S$GLB,, | Performed by: INTERNAL MEDICINE

## 2023-11-27 PROCEDURE — 1159F PR MEDICATION LIST DOCUMENTED IN MEDICAL RECORD: ICD-10-PCS | Mod: CPTII,S$GLB,, | Performed by: INTERNAL MEDICINE

## 2023-11-27 PROCEDURE — 99999 PR PBB SHADOW E&M-EST. PATIENT-LVL V: ICD-10-PCS | Mod: PBBFAC,,, | Performed by: INTERNAL MEDICINE

## 2023-11-27 PROCEDURE — 99214 PR OFFICE/OUTPT VISIT, EST, LEVL IV, 30-39 MIN: ICD-10-PCS | Mod: S$GLB,,, | Performed by: INTERNAL MEDICINE

## 2023-11-27 PROCEDURE — 3079F DIAST BP 80-89 MM HG: CPT | Mod: CPTII,S$GLB,, | Performed by: INTERNAL MEDICINE

## 2023-11-27 PROCEDURE — 99214 OFFICE O/P EST MOD 30 MIN: CPT | Mod: S$GLB,,, | Performed by: INTERNAL MEDICINE

## 2023-11-27 PROCEDURE — 1159F MED LIST DOCD IN RCRD: CPT | Mod: CPTII,S$GLB,, | Performed by: INTERNAL MEDICINE

## 2023-11-27 PROCEDURE — 99999 PR PBB SHADOW E&M-EST. PATIENT-LVL V: CPT | Mod: PBBFAC,,, | Performed by: INTERNAL MEDICINE

## 2023-11-27 PROCEDURE — 3077F SYST BP >= 140 MM HG: CPT | Mod: CPTII,S$GLB,, | Performed by: INTERNAL MEDICINE

## 2023-11-27 PROCEDURE — 3008F BODY MASS INDEX DOCD: CPT | Mod: CPTII,S$GLB,, | Performed by: INTERNAL MEDICINE

## 2023-11-27 PROCEDURE — 3079F PR MOST RECENT DIASTOLIC BLOOD PRESSURE 80-89 MM HG: ICD-10-PCS | Mod: CPTII,S$GLB,, | Performed by: INTERNAL MEDICINE

## 2023-11-27 PROCEDURE — 1160F RVW MEDS BY RX/DR IN RCRD: CPT | Mod: CPTII,S$GLB,, | Performed by: INTERNAL MEDICINE

## 2023-11-27 PROCEDURE — 3077F PR MOST RECENT SYSTOLIC BLOOD PRESSURE >= 140 MM HG: ICD-10-PCS | Mod: CPTII,S$GLB,, | Performed by: INTERNAL MEDICINE

## 2023-11-27 PROCEDURE — 3008F PR BODY MASS INDEX (BMI) DOCUMENTED: ICD-10-PCS | Mod: CPTII,S$GLB,, | Performed by: INTERNAL MEDICINE

## 2023-11-27 NOTE — PROGRESS NOTES
Subjective     Patient ID: Corinne Morrison Marcus is a 58 y.o. female.    Chief Complaint: Breast cancer metastasized to axillary lymph node, right    HPI    Returns for follow up  Understandable stress as a single parent  She was worried she was having a heart attack - noted a pressure and nausea  She did contact her therapist     Overall doing well  Ankle pain in AM- better once moves around. She does not want to switch or try holding.   Forgot her Prilosec yesterday and had severe heartburn.   Taking every morning since her surgery.    No other pain issues.   Appetite good and weight stable  No CP/SOB  No fever/chills.   No swelling.   Taking Vit D     Previously,  underwent OSMEL/BSO- details below  Date of Operation: 11/21/2022  Title of Operation:  1)  Total laparoscopic hysterectomy with bilateral salpingo-oophorectomy  2)  Laparoscopic bilateral uterosacral vaginal vault suspension  3)  Placement of retropubic tension-free midurethral sling, [employer] Fit ([employer])  4)  Cystourethroscopy  Recovery from hysterectomy was harder than she anticipated  Notes a blister- has topical antibiotic ointment and improving     Pathology:  UTERUS, CERVIX AND BILATERAL ADNEXA WEIGHING 103 G SHOWING:   INACTIVE ENDOMETRIUM WITH A SMALL BENIGN ENDOMETRIAL POLYP   THE CERVIX HAS NABOTHIAN CYSTS   SMALL INTRAMURAL AND SUBMUCOSAL LEIOMYOMAS ARE PRESENT   UNREMARKABLE LEFT AND RIGHT OVARIES   UNREMARKABLE LEFT AND RIGHT FALLOPIAN TUBES         Recent Imaging:  - 12/5/2022 PET scan:  FINDINGS:  Quality of the study: Adequate.  In the head and neck, there are no hypermetabolic lesions worrisome for malignancy. There are no hypermetabolic mucosal lesions, and there are no pathologically enlarged or hypermetabolic lymph nodes.  There is prominent physiologic uptake in the right masseter and bilateral pterygoid muscles and less prominent uptake in the right temporalis muscle.  In the chest, there are no hypermetabolic lesions  worrisome for malignancy.  There are no concerning pulmonary nodules or masses, and there are no pathologically enlarged or hypermetabolic lymph nodes.  There are postsurgical changes of bilateral mammoplasty and right axillary lymph node dissection without suspicious hypermetabolic or mass lesions.  In the abdomen and pelvis, there is physiologic tracer distribution within the abdominal organs and excretion into the genitourinary system.  In the bones, there are no hypermetabolic lesions worrisome for malignancy.  In the extremities, there are no hypermetabolic lesions worrisome for malignancy.  Focal retention of tracer near the left antecubital injection site.  Impression:  1.  No evidence of hypermetabolic tumor.  2.  Postsurgical changes of the breasts and right axilla.     - 11/17/2022 Dexa scan:  Normal bone mineral density.     - 10/16/2022 Transvaginal Ultrasound:  No sonographic abnormality.  No detrimental change from prior.     Oncology History:  Patient is a  56-year-old female w/ hx of HLD, depression, who underwent bilateral breast reduction due to macromastia.  Incidentally patient was found to have right breast invasive carcinoma during resection. Subsequently underwent b/l mastectomy with reconstruction + R SLNB and R ALND on 6/17/22.      Prior screening mammograms w/o mass.   MRI breast without any obvious masses or lymph node involvement.     Tumor size:  1.8  Tumor type:  Right breast invasive carcinoma  Tumor stgstrstastdstest:st st1st Lymph node status: 1/3 sentinel LN   Hormone status:  ER +, HI +  HER2 Kendra status: -ve  Other:  Rare focus of lymphovascular invasion     SH: Working as a laywer. . Three boys and two girls - 25, 23, 21, 19, 14.  Gyn Hx: Menses 13 y/o, post-menopausal 56 y/o  FH:  had metastatic melanoma, Maternal grandmother - breast ca 71 y/o  Paternal aunt - breast ca 49 y/o           Labs confirmed post menopausal status  Revision surgery 8/2022 - did well.   Started letrozole  mid 9/2022     - [employer] genetics testing negative    Review of Systems   Constitutional:  Negative for activity change, appetite change, chills, fatigue, fever and unexpected weight change.   HENT:  Negative for nasal congestion, tinnitus and trouble swallowing.    Respiratory:  Negative for cough, shortness of breath and wheezing.    Cardiovascular:  Negative for chest pain, palpitations and leg swelling.   Gastrointestinal:  Negative for abdominal distention, abdominal pain, blood in stool, constipation, diarrhea, nausea and vomiting.   Endocrine: Positive for heat intolerance.   Genitourinary:  Negative for decreased urine volume, difficulty urinating, dysuria, frequency and hematuria.   Musculoskeletal:  Negative for arthralgias, back pain, gait problem, joint swelling and neck pain.   Integumentary:  Negative for pallor and rash.   Neurological:  Negative for dizziness, weakness, light-headedness, numbness and headaches.   Hematological:  Negative for adenopathy. Does not bruise/bleed easily.   Psychiatric/Behavioral:  Negative for dysphoric mood and sleep disturbance. The patient is nervous/anxious.           Objective     Physical Exam  Vitals and nursing note reviewed.   Constitutional:       General: She is not in acute distress.     Appearance: Normal appearance. She is well-developed and normal weight.      Comments: Presents alone  Very pleasant   HENT:      Head: Normocephalic and atraumatic.   Eyes:      General: Lids are normal. No scleral icterus.     Extraocular Movements: Extraocular movements intact.      Conjunctiva/sclera: Conjunctivae normal.      Pupils: Pupils are equal, round, and reactive to light.   Neck:      Thyroid: No thyromegaly.      Vascular: No JVD.      Trachea: Trachea normal.   Cardiovascular:      Rate and Rhythm: Normal rate and regular rhythm.      Heart sounds: Normal heart sounds.   Pulmonary:      Effort: Pulmonary effort is normal. No respiratory distress.      Breath  sounds: Normal breath sounds. No wheezing, rhonchi or rales.      Comments: Reconstructed breasts  No masses or lymphadenopathy  Abdominal:      General: Bowel sounds are normal. There is no distension.      Palpations: Abdomen is soft. There is no mass.      Tenderness: There is no abdominal tenderness. There is no rebound.      Comments: No organomegaly.   Recent surgical site healing well-- one area bandaged but also healing well   Musculoskeletal:         General: No swelling, tenderness or deformity. Normal range of motion.      Cervical back: Normal range of motion and neck supple.      Right lower leg: No edema.      Left lower leg: No edema.      Comments: No spinal or paraspinal tenderness.    Lymphadenopathy:      Head:      Right side of head: No submental or submandibular adenopathy.      Left side of head: No submental or submandibular adenopathy.      Cervical: No cervical adenopathy.      Upper Body:      Right upper body: No supraclavicular or axillary adenopathy.      Left upper body: No supraclavicular or axillary adenopathy.   Skin:     General: Skin is warm and dry.      Capillary Refill: Capillary refill takes less than 2 seconds.      Coloration: Skin is not jaundiced or pale.      Findings: No bruising, erythema or rash.      Nails: There is no clubbing.   Neurological:      General: No focal deficit present.      Mental Status: She is alert and oriented to person, place, and time.      Motor: No weakness.      Coordination: Coordination normal.      Gait: Gait normal.   Psychiatric:         Mood and Affect: Mood normal.         Speech: Speech normal.         Behavior: Behavior normal.         Thought Content: Thought content normal.         Judgment: Judgment normal.          Assessment and Plan     1. Breast cancer metastasized to axillary lymph node, right  -     CBC W/ AUTO DIFFERENTIAL; Future; Expected date: 11/27/2023  -     CMP; Future; Expected date: 11/27/2023    2. Chest pain,  unspecified type      Needs updated labs and will do at [facility] later this week    Overall doing well and clinically CHUY  Continue Letrozole.   RTC 3 months- knows to call for any issues    Chest pain-- referral to cardiology    Route Chart for Scheduling    Med Onc Chart Routing  Urgent    Follow up with physician . Cbc, cmp at [facility] this week   Follow up with SHAUN 3 months. cbc, cmp and EP; cardiology referral   Infusion scheduling note    Injection scheduling note    Labs    Imaging    Pharmacy appointment    Other referrals

## 2023-11-29 ENCOUNTER — LAB VISIT (OUTPATIENT)
Dept: LAB | Facility: OTHER | Age: 58
End: 2023-11-29
Attending: STUDENT IN AN ORGANIZED HEALTH CARE EDUCATION/TRAINING PROGRAM
Payer: COMMERCIAL

## 2023-11-29 DIAGNOSIS — Z79.811 AROMATASE INHIBITOR USE: ICD-10-CM

## 2023-11-29 DIAGNOSIS — C77.3 BREAST CANCER METASTASIZED TO AXILLARY LYMPH NODE, RIGHT: ICD-10-CM

## 2023-11-29 DIAGNOSIS — C50.911 BREAST CANCER METASTASIZED TO AXILLARY LYMPH NODE, RIGHT: ICD-10-CM

## 2023-11-29 LAB
ALBUMIN SERPL BCP-MCNC: 4.2 G/DL (ref 3.5–5.2)
ALBUMIN SERPL BCP-MCNC: 4.2 G/DL (ref 3.5–5.2)
ALP SERPL-CCNC: 81 U/L (ref 55–135)
ALP SERPL-CCNC: 81 U/L (ref 55–135)
ALT SERPL W/O P-5'-P-CCNC: 23 U/L (ref 10–44)
ALT SERPL W/O P-5'-P-CCNC: 23 U/L (ref 10–44)
ANION GAP SERPL CALC-SCNC: 10 MMOL/L (ref 8–16)
ANION GAP SERPL CALC-SCNC: 10 MMOL/L (ref 8–16)
AST SERPL-CCNC: 25 U/L (ref 10–40)
AST SERPL-CCNC: 25 U/L (ref 10–40)
BASOPHILS # BLD AUTO: 0.05 K/UL (ref 0–0.2)
BASOPHILS # BLD AUTO: 0.05 K/UL (ref 0–0.2)
BASOPHILS NFR BLD: 0.9 % (ref 0–1.9)
BASOPHILS NFR BLD: 0.9 % (ref 0–1.9)
BILIRUB SERPL-MCNC: 0.6 MG/DL (ref 0.1–1)
BILIRUB SERPL-MCNC: 0.6 MG/DL (ref 0.1–1)
BUN SERPL-MCNC: 17 MG/DL (ref 6–20)
BUN SERPL-MCNC: 17 MG/DL (ref 6–20)
CALCIUM SERPL-MCNC: 9.5 MG/DL (ref 8.7–10.5)
CALCIUM SERPL-MCNC: 9.5 MG/DL (ref 8.7–10.5)
CHLORIDE SERPL-SCNC: 107 MMOL/L (ref 95–110)
CHLORIDE SERPL-SCNC: 107 MMOL/L (ref 95–110)
CO2 SERPL-SCNC: 24 MMOL/L (ref 23–29)
CO2 SERPL-SCNC: 24 MMOL/L (ref 23–29)
CREAT SERPL-MCNC: 0.8 MG/DL (ref 0.5–1.4)
CREAT SERPL-MCNC: 0.8 MG/DL (ref 0.5–1.4)
DIFFERENTIAL METHOD: ABNORMAL
DIFFERENTIAL METHOD: ABNORMAL
EOSINOPHIL # BLD AUTO: 0.1 K/UL (ref 0–0.5)
EOSINOPHIL # BLD AUTO: 0.1 K/UL (ref 0–0.5)
EOSINOPHIL NFR BLD: 1.7 % (ref 0–8)
EOSINOPHIL NFR BLD: 1.7 % (ref 0–8)
ERYTHROCYTE [DISTWIDTH] IN BLOOD BY AUTOMATED COUNT: 13.4 % (ref 11.5–14.5)
ERYTHROCYTE [DISTWIDTH] IN BLOOD BY AUTOMATED COUNT: 13.4 % (ref 11.5–14.5)
EST. GFR  (NO RACE VARIABLE): >60 ML/MIN/1.73 M^2
EST. GFR  (NO RACE VARIABLE): >60 ML/MIN/1.73 M^2
FERRITIN SERPL-MCNC: 104 NG/ML (ref 20–300)
GLUCOSE SERPL-MCNC: 93 MG/DL (ref 70–110)
GLUCOSE SERPL-MCNC: 93 MG/DL (ref 70–110)
HCT VFR BLD AUTO: 37.2 % (ref 37–48.5)
HCT VFR BLD AUTO: 37.2 % (ref 37–48.5)
HGB BLD-MCNC: 12.6 G/DL (ref 12–16)
HGB BLD-MCNC: 12.6 G/DL (ref 12–16)
IMM GRANULOCYTES # BLD AUTO: 0.02 K/UL (ref 0–0.04)
IMM GRANULOCYTES # BLD AUTO: 0.02 K/UL (ref 0–0.04)
IMM GRANULOCYTES NFR BLD AUTO: 0.3 % (ref 0–0.5)
IMM GRANULOCYTES NFR BLD AUTO: 0.3 % (ref 0–0.5)
IRON SERPL-MCNC: 115 UG/DL (ref 30–160)
LYMPHOCYTES # BLD AUTO: 1.1 K/UL (ref 1–4.8)
LYMPHOCYTES # BLD AUTO: 1.1 K/UL (ref 1–4.8)
LYMPHOCYTES NFR BLD: 19.6 % (ref 18–48)
LYMPHOCYTES NFR BLD: 19.6 % (ref 18–48)
MCH RBC QN AUTO: 30.1 PG (ref 27–31)
MCH RBC QN AUTO: 30.1 PG (ref 27–31)
MCHC RBC AUTO-ENTMCNC: 33.9 G/DL (ref 32–36)
MCHC RBC AUTO-ENTMCNC: 33.9 G/DL (ref 32–36)
MCV RBC AUTO: 89 FL (ref 82–98)
MCV RBC AUTO: 89 FL (ref 82–98)
MONOCYTES # BLD AUTO: 0.5 K/UL (ref 0.3–1)
MONOCYTES # BLD AUTO: 0.5 K/UL (ref 0.3–1)
MONOCYTES NFR BLD: 9.3 % (ref 4–15)
MONOCYTES NFR BLD: 9.3 % (ref 4–15)
NEUTROPHILS # BLD AUTO: 3.9 K/UL (ref 1.8–7.7)
NEUTROPHILS # BLD AUTO: 3.9 K/UL (ref 1.8–7.7)
NEUTROPHILS NFR BLD: 68.2 % (ref 38–73)
NEUTROPHILS NFR BLD: 68.2 % (ref 38–73)
NRBC BLD-RTO: 0 /100 WBC
NRBC BLD-RTO: 0 /100 WBC
PLATELET # BLD AUTO: 281 K/UL (ref 150–450)
PLATELET # BLD AUTO: 281 K/UL (ref 150–450)
PMV BLD AUTO: 8.8 FL (ref 9.2–12.9)
PMV BLD AUTO: 8.8 FL (ref 9.2–12.9)
POTASSIUM SERPL-SCNC: 4.2 MMOL/L (ref 3.5–5.1)
POTASSIUM SERPL-SCNC: 4.2 MMOL/L (ref 3.5–5.1)
PROT SERPL-MCNC: 7.9 G/DL (ref 6–8.4)
PROT SERPL-MCNC: 7.9 G/DL (ref 6–8.4)
RBC # BLD AUTO: 4.19 M/UL (ref 4–5.4)
RBC # BLD AUTO: 4.19 M/UL (ref 4–5.4)
SATURATED IRON: 27 % (ref 20–50)
SODIUM SERPL-SCNC: 141 MMOL/L (ref 136–145)
SODIUM SERPL-SCNC: 141 MMOL/L (ref 136–145)
T4 FREE SERPL-MCNC: 1.05 NG/DL (ref 0.71–1.51)
TOTAL IRON BINDING CAPACITY: 420 UG/DL (ref 250–450)
TRANSFERRIN SERPL-MCNC: 284 MG/DL (ref 200–375)
TSH SERPL DL<=0.005 MIU/L-ACNC: 0.52 UIU/ML (ref 0.4–4)
WBC # BLD AUTO: 5.78 K/UL (ref 3.9–12.7)
WBC # BLD AUTO: 5.78 K/UL (ref 3.9–12.7)

## 2023-11-29 PROCEDURE — 84439 ASSAY OF FREE THYROXINE: CPT | Performed by: NURSE PRACTITIONER

## 2023-11-29 PROCEDURE — 82728 ASSAY OF FERRITIN: CPT | Performed by: NURSE PRACTITIONER

## 2023-11-29 PROCEDURE — 36415 COLL VENOUS BLD VENIPUNCTURE: CPT | Performed by: NURSE PRACTITIONER

## 2023-11-29 PROCEDURE — 85025 COMPLETE CBC W/AUTO DIFF WBC: CPT | Performed by: INTERNAL MEDICINE

## 2023-11-29 PROCEDURE — 84443 ASSAY THYROID STIM HORMONE: CPT | Performed by: NURSE PRACTITIONER

## 2023-11-29 PROCEDURE — 84466 ASSAY OF TRANSFERRIN: CPT | Performed by: NURSE PRACTITIONER

## 2023-11-29 PROCEDURE — 80053 COMPREHEN METABOLIC PANEL: CPT | Performed by: INTERNAL MEDICINE

## 2023-11-29 PROCEDURE — 83540 ASSAY OF IRON: CPT | Performed by: NURSE PRACTITIONER

## 2023-12-03 DIAGNOSIS — Z17.0 MALIGNANT NEOPLASM OF RIGHT BREAST IN FEMALE, ESTROGEN RECEPTOR POSITIVE, UNSPECIFIED SITE OF BREAST: ICD-10-CM

## 2023-12-03 DIAGNOSIS — C50.911 MALIGNANT NEOPLASM OF RIGHT BREAST IN FEMALE, ESTROGEN RECEPTOR POSITIVE, UNSPECIFIED SITE OF BREAST: ICD-10-CM

## 2023-12-04 ENCOUNTER — OFFICE VISIT (OUTPATIENT)
Dept: CARDIOLOGY | Facility: CLINIC | Age: 58
End: 2023-12-04
Attending: INTERNAL MEDICINE
Payer: COMMERCIAL

## 2023-12-04 VITALS
OXYGEN SATURATION: 98 % | HEART RATE: 79 BPM | BODY MASS INDEX: 24.6 KG/M2 | WEIGHT: 143.31 LBS | DIASTOLIC BLOOD PRESSURE: 94 MMHG | SYSTOLIC BLOOD PRESSURE: 158 MMHG

## 2023-12-04 DIAGNOSIS — C77.3 BREAST CANCER METASTASIZED TO AXILLARY LYMPH NODE, RIGHT: ICD-10-CM

## 2023-12-04 DIAGNOSIS — C50.911 BREAST CANCER METASTASIZED TO AXILLARY LYMPH NODE, RIGHT: ICD-10-CM

## 2023-12-04 DIAGNOSIS — R07.9 CHEST PAIN, UNSPECIFIED TYPE: Primary | ICD-10-CM

## 2023-12-04 PROCEDURE — 3080F DIAST BP >= 90 MM HG: CPT | Mod: CPTII,S$GLB,, | Performed by: INTERNAL MEDICINE

## 2023-12-04 PROCEDURE — 3008F PR BODY MASS INDEX (BMI) DOCUMENTED: ICD-10-PCS | Mod: CPTII,S$GLB,, | Performed by: INTERNAL MEDICINE

## 2023-12-04 PROCEDURE — 93005 ELECTROCARDIOGRAM TRACING: CPT

## 2023-12-04 PROCEDURE — 3080F PR MOST RECENT DIASTOLIC BLOOD PRESSURE >= 90 MM HG: ICD-10-PCS | Mod: CPTII,S$GLB,, | Performed by: INTERNAL MEDICINE

## 2023-12-04 PROCEDURE — 99204 OFFICE O/P NEW MOD 45 MIN: CPT | Mod: 25,S$GLB,, | Performed by: INTERNAL MEDICINE

## 2023-12-04 PROCEDURE — 99204 PR OFFICE/OUTPT VISIT, NEW, LEVL IV, 45-59 MIN: ICD-10-PCS | Mod: 25,S$GLB,, | Performed by: INTERNAL MEDICINE

## 2023-12-04 PROCEDURE — 99999 PR PBB SHADOW E&M-EST. PATIENT-LVL III: CPT | Mod: PBBFAC,,, | Performed by: INTERNAL MEDICINE

## 2023-12-04 PROCEDURE — 99999 PR PBB SHADOW E&M-EST. PATIENT-LVL III: ICD-10-PCS | Mod: PBBFAC,,, | Performed by: INTERNAL MEDICINE

## 2023-12-04 PROCEDURE — 3077F SYST BP >= 140 MM HG: CPT | Mod: CPTII,S$GLB,, | Performed by: INTERNAL MEDICINE

## 2023-12-04 PROCEDURE — 1159F MED LIST DOCD IN RCRD: CPT | Mod: CPTII,S$GLB,, | Performed by: INTERNAL MEDICINE

## 2023-12-04 PROCEDURE — 1160F RVW MEDS BY RX/DR IN RCRD: CPT | Mod: CPTII,S$GLB,, | Performed by: INTERNAL MEDICINE

## 2023-12-04 PROCEDURE — 3008F BODY MASS INDEX DOCD: CPT | Mod: CPTII,S$GLB,, | Performed by: INTERNAL MEDICINE

## 2023-12-04 PROCEDURE — 93010 EKG 12-LEAD: ICD-10-PCS | Mod: S$GLB,,, | Performed by: INTERNAL MEDICINE

## 2023-12-04 PROCEDURE — 93010 ELECTROCARDIOGRAM REPORT: CPT | Mod: S$GLB,,, | Performed by: INTERNAL MEDICINE

## 2023-12-04 PROCEDURE — 3077F PR MOST RECENT SYSTOLIC BLOOD PRESSURE >= 140 MM HG: ICD-10-PCS | Mod: CPTII,S$GLB,, | Performed by: INTERNAL MEDICINE

## 2023-12-04 PROCEDURE — 1160F PR REVIEW ALL MEDS BY PRESCRIBER/CLIN PHARMACIST DOCUMENTED: ICD-10-PCS | Mod: CPTII,S$GLB,, | Performed by: INTERNAL MEDICINE

## 2023-12-04 PROCEDURE — 1159F PR MEDICATION LIST DOCUMENTED IN MEDICAL RECORD: ICD-10-PCS | Mod: CPTII,S$GLB,, | Performed by: INTERNAL MEDICINE

## 2023-12-04 RX ORDER — LETROZOLE 2.5 MG/1
TABLET, FILM COATED ORAL
Qty: 30 TABLET | Refills: 11 | Status: SHIPPED | OUTPATIENT
Start: 2023-12-04

## 2023-12-18 ENCOUNTER — PATIENT MESSAGE (OUTPATIENT)
Dept: INTERNAL MEDICINE | Facility: CLINIC | Age: 58
End: 2023-12-18
Payer: COMMERCIAL

## 2023-12-18 DIAGNOSIS — F32.A DEPRESSION, UNSPECIFIED DEPRESSION TYPE: ICD-10-CM

## 2023-12-18 RX ORDER — SERTRALINE HYDROCHLORIDE 100 MG/1
200 TABLET, FILM COATED ORAL DAILY
Qty: 90 TABLET | Refills: 2 | Status: SHIPPED | OUTPATIENT
Start: 2023-12-18 | End: 2024-01-29 | Stop reason: SDUPTHER

## 2024-01-08 ENCOUNTER — PATIENT MESSAGE (OUTPATIENT)
Dept: INTERNAL MEDICINE | Facility: CLINIC | Age: 59
End: 2024-01-08
Payer: COMMERCIAL

## 2024-01-08 RX ORDER — ONDANSETRON 4 MG/1
8 TABLET, ORALLY DISINTEGRATING ORAL 2 TIMES DAILY
Qty: 15 TABLET | Refills: 3 | Status: SHIPPED | OUTPATIENT
Start: 2024-01-08 | End: 2024-01-29 | Stop reason: SDUPTHER

## 2024-01-08 NOTE — TELEPHONE ENCOUNTER
Refill Routing Note   Medication(s) are not appropriate for processing by [facility] for the following reason(s):        Outside of protocol    [facility] action(s):  Route               Appointments  past 12m or future 3m with PCP    Date Provider   Last Visit   1/6/2023 Gene Sterling MD   Next Visit   3/19/2024 Gene Sterling MD   ED visits in past 90 days: 0        Note composed:10:11 AM 01/08/2024

## 2024-01-08 NOTE — TELEPHONE ENCOUNTER
No care due was identified.  [facility] Embedded Care Due Messages. Reference number: [id number].   1/08/2024 9:55:24 AM CST

## 2024-01-08 NOTE — TELEPHONE ENCOUNTER
Spoke c Pt. She will check c pharmacy, since ZOLOFT has been sent in our system. Also requests ZOFRAN. Request pended and sent to [facility]. Pt. to see Dr. Sterling 03/19/24

## 2024-01-09 ENCOUNTER — PATIENT MESSAGE (OUTPATIENT)
Dept: INTERNAL MEDICINE | Facility: CLINIC | Age: 59
End: 2024-01-09
Payer: COMMERCIAL

## 2024-01-18 DIAGNOSIS — E78.00 HYPERCHOLESTEROLEMIA: ICD-10-CM

## 2024-01-18 RX ORDER — ATORVASTATIN CALCIUM 40 MG/1
TABLET, FILM COATED ORAL
Qty: 90 TABLET | Refills: 3 | Status: SHIPPED | OUTPATIENT
Start: 2024-01-18

## 2024-01-18 NOTE — TELEPHONE ENCOUNTER
No care due was identified.  [facility] Embedded Care Due Messages. Reference number: [id number].   1/18/2024 12:27:54 AM CST

## 2024-01-18 NOTE — TELEPHONE ENCOUNTER
Refill Routing Note   Medication(s) are not appropriate for processing by [facility] for the following reason(s):        Required labs outdated    [facility] action(s):  Defer               Appointments  past 12m or future 3m with PCP    Date Provider   Last Visit   1/6/2023 Gene Sterling MD   Next Visit   2/8/2024 Gene Sterling MD   ED visits in past 90 days: 0        Note composed:3:00 PM 01/18/2024

## 2024-01-29 ENCOUNTER — PATIENT MESSAGE (OUTPATIENT)
Dept: INTERNAL MEDICINE | Facility: CLINIC | Age: 59
End: 2024-01-29
Payer: COMMERCIAL

## 2024-01-29 DIAGNOSIS — R11.0 NAUSEA: Primary | ICD-10-CM

## 2024-01-29 DIAGNOSIS — G47.00 INSOMNIA, UNSPECIFIED TYPE: ICD-10-CM

## 2024-01-29 DIAGNOSIS — R11.0 NAUSEA: ICD-10-CM

## 2024-01-29 DIAGNOSIS — F32.A DEPRESSION, UNSPECIFIED DEPRESSION TYPE: ICD-10-CM

## 2024-01-29 RX ORDER — ONDANSETRON 8 MG/1
TABLET, ORALLY DISINTEGRATING ORAL
Refills: 0 | OUTPATIENT
Start: 2024-01-29

## 2024-01-29 RX ORDER — SERTRALINE HYDROCHLORIDE 100 MG/1
TABLET, FILM COATED ORAL
Refills: 0 | OUTPATIENT
Start: 2024-01-29

## 2024-01-29 RX ORDER — ONDANSETRON 4 MG/1
8 TABLET, ORALLY DISINTEGRATING ORAL 2 TIMES DAILY
Qty: 15 TABLET | Refills: 3 | Status: SHIPPED | OUTPATIENT
Start: 2024-01-29

## 2024-01-29 RX ORDER — TRAZODONE HYDROCHLORIDE 100 MG/1
TABLET ORAL
Refills: 0 | OUTPATIENT
Start: 2024-01-29

## 2024-01-29 RX ORDER — TRAZODONE HYDROCHLORIDE 50 MG/1
100 TABLET ORAL NIGHTLY
Qty: 180 TABLET | Refills: 3 | Status: SHIPPED | OUTPATIENT
Start: 2024-01-29

## 2024-01-29 RX ORDER — SERTRALINE HYDROCHLORIDE 100 MG/1
200 TABLET, FILM COATED ORAL DAILY
Qty: 180 TABLET | Refills: 2 | Status: SHIPPED | OUTPATIENT
Start: 2024-01-29

## 2024-01-29 NOTE — TELEPHONE ENCOUNTER
No care due was identified.  [facility] Embedded Care Due Messages. Reference number: [id number].   1/29/2024 11:51:05 AM CST

## 2024-01-29 NOTE — TELEPHONE ENCOUNTER
No care due was identified.  [facility] Embedded Care Due Messages. Reference number: [id number].   1/29/2024 12:10:56 PM CST

## 2024-01-30 NOTE — TELEPHONE ENCOUNTER
Refill Decision Note   Corinne Marcus  is requesting a refill authorization.  Brief Assessment and Rationale for Refill:  Quick Discontinue     Medication Therapy Plan:    Pharmacy is requesting new scripts for the following medications without required information, (sig/ frequency/qty/etc)      Medication Reconciliation Completed: No     Comments: Pharmacies have been requesting medications for patients without required information, (sig, frequency, qty, etc.). In addition, requests are sent for medication(s) pt. are currently not taking, and medications patients have never taken.    We have spoken to the pharmacies about these request types and advised their teams previously that we are unable to assess these New Script requests and require all details for these requests. This is a known issue and has been reported.     Note composed:6:49 PM 01/29/2024

## 2024-02-08 ENCOUNTER — OFFICE VISIT (OUTPATIENT)
Dept: INTERNAL MEDICINE | Facility: CLINIC | Age: 59
End: 2024-02-08
Payer: COMMERCIAL

## 2024-02-08 VITALS
DIASTOLIC BLOOD PRESSURE: 78 MMHG | OXYGEN SATURATION: 99 % | WEIGHT: 138.88 LBS | SYSTOLIC BLOOD PRESSURE: 122 MMHG | HEART RATE: 70 BPM | BODY MASS INDEX: 23.84 KG/M2

## 2024-02-08 DIAGNOSIS — F32.1 CURRENT MODERATE EPISODE OF MAJOR DEPRESSIVE DISORDER WITHOUT PRIOR EPISODE: ICD-10-CM

## 2024-02-08 DIAGNOSIS — F32.A ANXIETY AND DEPRESSION: ICD-10-CM

## 2024-02-08 DIAGNOSIS — E53.8 LOW SERUM VITAMIN B12: ICD-10-CM

## 2024-02-08 DIAGNOSIS — F41.9 ANXIETY AND DEPRESSION: ICD-10-CM

## 2024-02-08 DIAGNOSIS — Z00.00 ANNUAL PHYSICAL EXAM: Primary | ICD-10-CM

## 2024-02-08 DIAGNOSIS — E04.1 THYROID NODULE: ICD-10-CM

## 2024-02-08 DIAGNOSIS — Z90.710 S/P LAPAROSCOPIC HYSTERECTOMY: ICD-10-CM

## 2024-02-08 DIAGNOSIS — E78.00 HYPERCHOLESTEROLEMIA: ICD-10-CM

## 2024-02-08 DIAGNOSIS — C50.911 BREAST CANCER METASTASIZED TO AXILLARY LYMPH NODE, RIGHT: ICD-10-CM

## 2024-02-08 DIAGNOSIS — C77.3 BREAST CANCER METASTASIZED TO AXILLARY LYMPH NODE, RIGHT: ICD-10-CM

## 2024-02-08 DIAGNOSIS — F43.21 GRIEF REACTION: ICD-10-CM

## 2024-02-08 DIAGNOSIS — E04.2 MULTINODULAR THYROID: ICD-10-CM

## 2024-02-08 PROBLEM — R07.9 CHEST PAIN: Status: RESOLVED | Noted: 2022-07-11 | Resolved: 2024-02-08

## 2024-02-08 PROCEDURE — 3074F SYST BP LT 130 MM HG: CPT | Mod: CPTII,S$GLB,, | Performed by: STUDENT IN AN ORGANIZED HEALTH CARE EDUCATION/TRAINING PROGRAM

## 2024-02-08 PROCEDURE — 3008F BODY MASS INDEX DOCD: CPT | Mod: CPTII,S$GLB,, | Performed by: STUDENT IN AN ORGANIZED HEALTH CARE EDUCATION/TRAINING PROGRAM

## 2024-02-08 PROCEDURE — 99396 PREV VISIT EST AGE 40-64: CPT | Mod: S$GLB,,, | Performed by: STUDENT IN AN ORGANIZED HEALTH CARE EDUCATION/TRAINING PROGRAM

## 2024-02-08 PROCEDURE — 1159F MED LIST DOCD IN RCRD: CPT | Mod: CPTII,S$GLB,, | Performed by: STUDENT IN AN ORGANIZED HEALTH CARE EDUCATION/TRAINING PROGRAM

## 2024-02-08 PROCEDURE — 99999 PR PBB SHADOW E&M-EST. PATIENT-LVL III: CPT | Mod: PBBFAC,,, | Performed by: STUDENT IN AN ORGANIZED HEALTH CARE EDUCATION/TRAINING PROGRAM

## 2024-02-08 PROCEDURE — 3078F DIAST BP <80 MM HG: CPT | Mod: CPTII,S$GLB,, | Performed by: STUDENT IN AN ORGANIZED HEALTH CARE EDUCATION/TRAINING PROGRAM

## 2024-02-08 NOTE — PROGRESS NOTES
"[facility]    Subjective:       Patient ID: Corinne Morrison Marcus is a 58 y.o. female.    Chief Complaint: Annual Exam      History was obtained from the patient and supplemented through chart review.  This patient is known to me.     HPI:    Pt is a 58 y.o. female r/ right breast invasive cancer, HLD, presents for annual      Recent Right Breast invasive cancer s/p resection  S/p bilat mastectomy with reconstruction  Stage 1B IDC of right Breast, ER+, IL+, Node +,  HER2-  H/O: Dr. Frazier  Taking letrozole for chemoprevention  Stable      Grief reaction  Adjustment reaction with anxiety and depression  Panic attacks  Seeing psychiatrist Shiva Madera, "on call" doing well, doing therapy as well  Trazodone 50 mg qhs PRN, sometimes taking 100 mg  Taking zoloft 200 mg, stable.    R/B discussed    Thyroid nodule  Did not meet FNA threshold 2021, overdue for f/u  thyroid us normal Dec 2022, will repeat    Lung nodule  5mm stable 2017 -> 2019  No further imaging needed    Perimenopausal symptoms  Had been on compounded hormones in the past    HLD  Improved on statin  Stable, repeat    Constipation  Nightly sennakot per GI  Stable, colonoscopy utd    Two daughters 18/20 who see me as PCP as well  Two sons   passed after metastatic melanoma, death April 25th 2021  Mother patient      Medical History  Past Medical History:   Diagnosis Date    Abnormal Pap smear of cervix     Abnormal Pap smear of vagina     Anxiety     Breast cancer 06/08/2022    Cancer     Constipation     Depression     Hemorrhoids     HLD (hyperlipidemia)     Lung nodule     Multinodular goiter 2010    s/p bx of 2 nodules in 2011 - benign    PONV (postoperative nausea and vomiting)        Review of Systems   Constitutional:  Negative for fever.   HENT:  Negative for trouble swallowing.    Respiratory:  Negative for shortness of breath.    Cardiovascular:  Negative for chest pain.   Gastrointestinal:  Negative for constipation, " diarrhea, nausea and vomiting.   Musculoskeletal:  Negative for gait problem.   Neurological:  Negative for dizziness and seizures.   Psychiatric/Behavioral:  Negative for dysphoric mood and hallucinations.          Surgical hx, family hx, social hx   Have been reviewed      Current Outpatient Medications:     atorvastatin (LIPITOR) 40 MG tablet, TAKE 1 TABLET DAILY, Disp: 90 tablet, Rfl: 3    cetirizine (ZYRTEC) 5 MG tablet, Take 5 mg by mouth once daily., Disp: , Rfl:     fluticasone (FLONASE) 50 mcg/actuation nasal spray, 1 spray by Each Nare route once daily., Disp: , Rfl:     minoxidiL (LONITEN) 2.5 MG tablet, Take 2.5 mg by mouth., Disp: , Rfl:     sertraline (ZOLOFT) 100 MG tablet, Take 2 tablets (200 mg total) by mouth once daily., Disp: 180 tablet, Rfl: 2    spironolactone (ALDACTONE) 100 MG tablet, Take 100 mg by mouth every morning., Disp: , Rfl:     traZODone (DESYREL) 50 MG tablet, Take 2 tablets (100 mg total) by mouth every evening., Disp: 180 tablet, Rfl: 3    clindamycin-benzoyl peroxide gel, APPLY TOPICALLY TO ACNE AREA EVERY DAY AT NIGHT. MAY DISCOLOR SHEETS AND TOWELS, Disp: , Rfl:     letrozole (FEMARA) 2.5 mg Tab, TAKE 1 TABLET DAILY (Patient not taking: Reported on 2/8/2024.), Disp: 30 tablet, Rfl: 11    ondansetron (ZOFRAN-ODT) 4 MG TbDL, Take 2 tablets (8 mg total) by mouth 2 (two) times daily. (Patient not taking: Reported on 2/8/2024), Disp: 15 tablet, Rfl: 3    Objective:        Body mass index is 23.84 kg/m².  Vitals:    02/08/24 0839   BP: 122/78   Pulse: 70   SpO2: 99%   Weight: 63 kg (138 lb 14.2 oz)   PainSc: 0-No pain         Physical Exam  Vitals and nursing note reviewed.   Constitutional:       General: She is not in acute distress.     Appearance: Normal appearance. She is not ill-appearing.   HENT:      Head: Normocephalic and atraumatic.   Eyes:      General: No scleral icterus.  Cardiovascular:      Rate and Rhythm: Normal rate and regular rhythm.      Heart sounds: Normal  heart sounds.   Pulmonary:      Effort: Pulmonary effort is normal.   Musculoskeletal:         General: No deformity.      Cervical back: Normal range of motion.   Skin:     General: Skin is warm and dry.   Neurological:      Mental Status: She is alert and oriented to person, place, and time.   Psychiatric:         Behavior: Behavior normal.           Lab Results   Component Value Date    WBC 5.78 11/29/2023    WBC 5.78 11/29/2023    HGB 12.6 11/29/2023    HGB 12.6 11/29/2023    HCT 37.2 11/29/2023    HCT 37.2 11/29/2023     11/29/2023     11/29/2023    CHOL 222 (H) 01/06/2023    TRIG 166 (H) 01/06/2023    HDL 67 01/06/2023    ALT 23 11/29/2023    ALT 23 11/29/2023    AST 25 11/29/2023    AST 25 11/29/2023     11/29/2023     11/29/2023    K 4.2 11/29/2023    K 4.2 11/29/2023     11/29/2023     11/29/2023    CREATININE 0.8 11/29/2023    CREATININE 0.8 11/29/2023    BUN 17 11/29/2023    BUN 17 11/29/2023    CO2 24 11/29/2023    CO2 24 11/29/2023    TSH 0.516 11/29/2023    INR 1.0 08/11/2022    HGBA1C 5.5 10/29/2021       The 10-year ASCVD risk score (Gavino BERNAL, et al., 2019) is: 2.3%    Values used to calculate the score:      Age: 58 years      Sex: Female      Is Non- : No      Diabetic: No      Tobacco smoker: No      Systolic Blood Pressure: 122 mmHg      Is BP treated: No      HDL Cholesterol: 67 mg/dL      Total Cholesterol: 222 mg/dL    (Imaging have been independently reviewed)  Reviewed MRI breast 6/2022    Assessment:         1. Annual physical exam    2. Thyroid nodule    3. Low serum vitamin B12    4. Hypercholesterolemia    5. Breast cancer metastasized to axillary lymph node, right    6. Multinodular thyroid    7. Current moderate episode of major depressive disorder without prior episode    8. Anxiety and depression    9. Grief reaction    10. S/p TLH/BSO/USLS/MUS/Cystoscopy 11/21/22              Plan:     Corinne was seen today for annual  exam.    Diagnoses and all orders for this visit:    Annual physical exam    Thyroid nodule  -     US Soft Tissue Head Neck; Future    Low serum vitamin B12  -     Vitamin B12; Future    Hypercholesterolemia  -     Lipid Panel; Future    Breast cancer metastasized to axillary lymph node, right    Multinodular thyroid    Current moderate episode of major depressive disorder without prior episode    Anxiety and depression    Grief reaction    S/p TLH/BSO/USLS/MUS/Cystoscopy 11/21/22        Health Maintenance  - Lipids: normal 2023 on statin  - A1C:   - Colon Ca Screen: colonoscopy Dr. Solorzano at [facility] 2021, repeat 5 years (pt thought 10) due to adenoma on pathology  - Immunizations: cvid vaccinated    Women's health  - Pap: NILM Nov 2020  - Mammo: s/p breast cancer  - Dexa: N/A due to age  - Contraception: post-menopausal    Follow up in about 1 year (around 2/8/2025). I        All medications were reviewed including potential side effects and risks/benefits.  Pt was counseled to call back if anything worsens or if questions arise.    Gene Sterling MD  Family Medicine  [facility]  [facility]  [address] [place] [id number]  Phone [phone]  Fax [phone]

## 2024-02-09 ENCOUNTER — LAB VISIT (OUTPATIENT)
Dept: LAB | Facility: OTHER | Age: 59
End: 2024-02-09
Attending: INTERNAL MEDICINE
Payer: COMMERCIAL

## 2024-02-09 DIAGNOSIS — E78.00 HYPERCHOLESTEROLEMIA: ICD-10-CM

## 2024-02-09 DIAGNOSIS — C77.3 BREAST CANCER METASTASIZED TO AXILLARY LYMPH NODE, RIGHT: ICD-10-CM

## 2024-02-09 DIAGNOSIS — C50.911 BREAST CANCER METASTASIZED TO AXILLARY LYMPH NODE, RIGHT: ICD-10-CM

## 2024-02-09 DIAGNOSIS — E53.8 LOW SERUM VITAMIN B12: ICD-10-CM

## 2024-02-09 LAB
ALBUMIN SERPL BCP-MCNC: 4.3 G/DL (ref 3.5–5.2)
ALP SERPL-CCNC: 74 U/L (ref 55–135)
ALT SERPL W/O P-5'-P-CCNC: 19 U/L (ref 10–44)
ANION GAP SERPL CALC-SCNC: 10 MMOL/L (ref 8–16)
AST SERPL-CCNC: 21 U/L (ref 10–40)
BASOPHILS # BLD AUTO: 0.03 K/UL (ref 0–0.2)
BASOPHILS NFR BLD: 0.6 % (ref 0–1.9)
BILIRUB SERPL-MCNC: 0.6 MG/DL (ref 0.1–1)
BUN SERPL-MCNC: 12 MG/DL (ref 6–20)
CALCIUM SERPL-MCNC: 9.7 MG/DL (ref 8.7–10.5)
CHLORIDE SERPL-SCNC: 105 MMOL/L (ref 95–110)
CHOLEST SERPL-MCNC: 225 MG/DL (ref 120–199)
CHOLEST/HDLC SERPL: 3.1 {RATIO} (ref 2–5)
CO2 SERPL-SCNC: 26 MMOL/L (ref 23–29)
CREAT SERPL-MCNC: 0.9 MG/DL (ref 0.5–1.4)
DIFFERENTIAL METHOD BLD: ABNORMAL
EOSINOPHIL # BLD AUTO: 0.2 K/UL (ref 0–0.5)
EOSINOPHIL NFR BLD: 3 % (ref 0–8)
ERYTHROCYTE [DISTWIDTH] IN BLOOD BY AUTOMATED COUNT: 13.2 % (ref 11.5–14.5)
EST. GFR  (NO RACE VARIABLE): >60 ML/MIN/1.73 M^2
GLUCOSE SERPL-MCNC: 97 MG/DL (ref 70–110)
HCT VFR BLD AUTO: 38.7 % (ref 37–48.5)
HDLC SERPL-MCNC: 72 MG/DL (ref 40–75)
HDLC SERPL: 32 % (ref 20–50)
HGB BLD-MCNC: 12.8 G/DL (ref 12–16)
IMM GRANULOCYTES # BLD AUTO: 0.02 K/UL (ref 0–0.04)
IMM GRANULOCYTES NFR BLD AUTO: 0.4 % (ref 0–0.5)
LDLC SERPL CALC-MCNC: 137.4 MG/DL (ref 63–159)
LYMPHOCYTES # BLD AUTO: 1.5 K/UL (ref 1–4.8)
LYMPHOCYTES NFR BLD: 28 % (ref 18–48)
MCH RBC QN AUTO: 29.8 PG (ref 27–31)
MCHC RBC AUTO-ENTMCNC: 33.1 G/DL (ref 32–36)
MCV RBC AUTO: 90 FL (ref 82–98)
MONOCYTES # BLD AUTO: 0.7 K/UL (ref 0.3–1)
MONOCYTES NFR BLD: 12.4 % (ref 4–15)
NEUTROPHILS # BLD AUTO: 3 K/UL (ref 1.8–7.7)
NEUTROPHILS NFR BLD: 55.6 % (ref 38–73)
NONHDLC SERPL-MCNC: 153 MG/DL
NRBC BLD-RTO: 0 /100 WBC
PLATELET # BLD AUTO: 304 K/UL (ref 150–450)
PMV BLD AUTO: 8.6 FL (ref 9.2–12.9)
POTASSIUM SERPL-SCNC: 4.1 MMOL/L (ref 3.5–5.1)
PROT SERPL-MCNC: 7.7 G/DL (ref 6–8.4)
RBC # BLD AUTO: 4.3 M/UL (ref 4–5.4)
SODIUM SERPL-SCNC: 141 MMOL/L (ref 136–145)
TRIGL SERPL-MCNC: 78 MG/DL (ref 30–150)
VIT B12 SERPL-MCNC: >2000 PG/ML (ref 210–950)
WBC # BLD AUTO: 5.39 K/UL (ref 3.9–12.7)

## 2024-02-09 PROCEDURE — 36415 COLL VENOUS BLD VENIPUNCTURE: CPT | Performed by: STUDENT IN AN ORGANIZED HEALTH CARE EDUCATION/TRAINING PROGRAM

## 2024-02-09 PROCEDURE — 80053 COMPREHEN METABOLIC PANEL: CPT | Performed by: INTERNAL MEDICINE

## 2024-02-09 PROCEDURE — 85025 COMPLETE CBC W/AUTO DIFF WBC: CPT | Performed by: INTERNAL MEDICINE

## 2024-02-09 PROCEDURE — 80061 LIPID PANEL: CPT | Performed by: STUDENT IN AN ORGANIZED HEALTH CARE EDUCATION/TRAINING PROGRAM

## 2024-02-09 PROCEDURE — 82607 VITAMIN B-12: CPT | Performed by: STUDENT IN AN ORGANIZED HEALTH CARE EDUCATION/TRAINING PROGRAM

## 2024-02-22 ENCOUNTER — OFFICE VISIT (OUTPATIENT)
Dept: HEMATOLOGY/ONCOLOGY | Facility: CLINIC | Age: 59
End: 2024-02-22
Payer: COMMERCIAL

## 2024-02-22 VITALS
BODY MASS INDEX: 23.26 KG/M2 | SYSTOLIC BLOOD PRESSURE: 139 MMHG | HEIGHT: 64 IN | HEART RATE: 79 BPM | TEMPERATURE: 98 F | WEIGHT: 136.25 LBS | DIASTOLIC BLOOD PRESSURE: 87 MMHG | OXYGEN SATURATION: 100 % | RESPIRATION RATE: 17 BRPM

## 2024-02-22 DIAGNOSIS — C50.911 BREAST CANCER METASTASIZED TO AXILLARY LYMPH NODE, RIGHT: Primary | ICD-10-CM

## 2024-02-22 DIAGNOSIS — Z90.710 S/P LAPAROSCOPIC HYSTERECTOMY: ICD-10-CM

## 2024-02-22 DIAGNOSIS — C77.3 BREAST CANCER METASTASIZED TO AXILLARY LYMPH NODE, RIGHT: Primary | ICD-10-CM

## 2024-02-22 DIAGNOSIS — Z79.811 AROMATASE INHIBITOR USE: ICD-10-CM

## 2024-02-22 DIAGNOSIS — F41.9 ANXIETY AND DEPRESSION: ICD-10-CM

## 2024-02-22 DIAGNOSIS — E04.2 MULTINODULAR THYROID: ICD-10-CM

## 2024-02-22 DIAGNOSIS — F32.A ANXIETY AND DEPRESSION: ICD-10-CM

## 2024-02-22 PROCEDURE — 3079F DIAST BP 80-89 MM HG: CPT | Mod: CPTII,S$GLB,, | Performed by: NURSE PRACTITIONER

## 2024-02-22 PROCEDURE — 3008F BODY MASS INDEX DOCD: CPT | Mod: CPTII,S$GLB,, | Performed by: NURSE PRACTITIONER

## 2024-02-22 PROCEDURE — 3075F SYST BP GE 130 - 139MM HG: CPT | Mod: CPTII,S$GLB,, | Performed by: NURSE PRACTITIONER

## 2024-02-22 PROCEDURE — 99214 OFFICE O/P EST MOD 30 MIN: CPT | Mod: S$GLB,,, | Performed by: NURSE PRACTITIONER

## 2024-02-22 PROCEDURE — 1159F MED LIST DOCD IN RCRD: CPT | Mod: CPTII,S$GLB,, | Performed by: NURSE PRACTITIONER

## 2024-02-22 PROCEDURE — 99999 PR PBB SHADOW E&M-EST. PATIENT-LVL IV: CPT | Mod: PBBFAC,,, | Performed by: NURSE PRACTITIONER

## 2024-02-22 NOTE — PROGRESS NOTES
Subjective     Patient ID: Corinne Morrison Marcus is a 58 y.o. female.    Chief Complaint: Breast cancer metastasized to axillary lymph node, right    HPI    Returns for follow up  No hospitalizations in the interval  Recent PCP visit.   Having thyroid US this evening for h/o nodule.   No breast concerns/chest wall.   Patient reports being puffy below the lower abd incision.   No CP/SOB  Ankles continue to hurt in AM- relieved with activity  Stays active- plays tennis and pickle ball.   Taking Vit D          Oncology History:  Patient is a  56-year-old female w/ hx of HLD, depression, who underwent bilateral breast reduction due to macromastia.  Incidentally patient was found to have right breast invasive carcinoma during resection. Subsequently underwent b/l mastectomy with reconstruction + R SLNB and R ALND on 6/17/22.      Prior screening mammograms w/o mass.   MRI breast without any obvious masses or lymph node involvement.     Tumor size:  1.8  Tumor type:  Right breast invasive carcinoma  Tumor rdgrdrrdarddrderd:rd rd3rd Lymph node status: 1/3 sentinel LN   Hormone status:  ER +, [place] +  HER2 Kendra status: -ve  Other:  Rare focus of lymphovascular invasion     SH: Working as a laywer. . Three boys and two girls - 25, 23, 21, 19, 14.  Gyn Hx: Menses 13 y/o, post-menopausal 56 y/o  FH:  had metastatic melanoma, Maternal grandmother - breast ca 71 y/o  Paternal aunt - breast ca 51 y/o           Labs confirmed post menopausal status  Revision surgery 8/2022 - did well.   Started letrozole mid 9/2022     - [employer] genetics testing negative    - 12/5/2022 PET scan:  FINDINGS:  Quality of the study: Adequate.  In the head and neck, there are no hypermetabolic lesions worrisome for malignancy. There are no hypermetabolic mucosal lesions, and there are no pathologically enlarged or hypermetabolic lymph nodes.  There is prominent physiologic uptake in the right masseter and bilateral pterygoid muscles and less prominent  uptake in the right temporalis muscle.  In the chest, there are no hypermetabolic lesions worrisome for malignancy.  There are no concerning pulmonary nodules or masses, and there are no pathologically enlarged or hypermetabolic lymph nodes.  There are postsurgical changes of bilateral mammoplasty and right axillary lymph node dissection without suspicious hypermetabolic or mass lesions.  In the abdomen and pelvis, there is physiologic tracer distribution within the abdominal organs and excretion into the genitourinary system.  In the bones, there are no hypermetabolic lesions worrisome for malignancy.  In the extremities, there are no hypermetabolic lesions worrisome for malignancy.  Focal retention of tracer near the left antecubital injection site.  Impression:  1.  No evidence of hypermetabolic tumor.  2.  Postsurgical changes of the breasts and right axilla.     - 11/17/2022 Dexa scan:  Normal bone mineral density.     - 10/16/2022 Transvaginal Ultrasound:  No sonographic abnormality.  No detrimental change from prior.      Previously,  underwent OSMEL/BSO- details below  Date of Operation: 11/21/2022  Title of Operation:  1)  Total laparoscopic hysterectomy with bilateral salpingo-oophorectomy  2)  Laparoscopic bilateral uterosacral vaginal vault suspension  3)  Placement of retropubic tension-free midurethral sling, Advantage Fit (REMOTV)  4)  Cystourethroscopy  Recovery from hysterectomy was harder than she anticipated  Notes a blister- has topical antibiotic ointment and improving     Pathology:  UTERUS, CERVIX AND BILATERAL ADNEXA WEIGHING 103 G SHOWING:   INACTIVE ENDOMETRIUM WITH A SMALL BENIGN ENDOMETRIAL POLYP   THE CERVIX HAS NABOTHIAN CYSTS   SMALL INTRAMURAL AND SUBMUCOSAL LEIOMYOMAS ARE PRESENT   UNREMARKABLE LEFT AND RIGHT OVARIES   UNREMARKABLE LEFT AND RIGHT FALLOPIAN TUBES        Review of Systems   Constitutional:         See above   All other systems reviewed and are negative.          Objective     Physical Exam  Vitals and nursing note reviewed.   Constitutional:       General: She is not in acute distress.     Appearance: Normal appearance. She is well-developed and normal weight.      Comments: Presents alone  Very pleasant   HENT:      Head: Normocephalic and atraumatic.   Eyes:      General: Lids are normal. No scleral icterus.     Extraocular Movements: Extraocular movements intact.      Conjunctiva/sclera: Conjunctivae normal.      Pupils: Pupils are equal, round, and reactive to light.   Neck:      Thyroid: No thyromegaly.      Vascular: No JVD.      Trachea: Trachea normal.   Cardiovascular:      Rate and Rhythm: Normal rate and regular rhythm.      Heart sounds: Normal heart sounds.   Pulmonary:      Effort: Pulmonary effort is normal. No respiratory distress.      Breath sounds: Normal breath sounds. No wheezing, rhonchi or rales.      Comments: Reconstructed breasts  No masses or lymphadenopathy  Abdominal:      General: Bowel sounds are normal. There is no distension.      Palpations: Abdomen is soft. There is no mass.      Tenderness: There is no abdominal tenderness. There is no rebound.      Comments: No organomegaly.   Recent surgical site healing well-- one area bandaged but also healing well   Musculoskeletal:         General: No swelling, tenderness or deformity. Normal range of motion.      Cervical back: Normal range of motion and neck supple.      Right lower leg: No edema.      Left lower leg: No edema.      Comments: No spinal or paraspinal tenderness.    Lymphadenopathy:      Head:      Right side of head: No submental or submandibular adenopathy.      Left side of head: No submental or submandibular adenopathy.      Cervical: No cervical adenopathy.      Upper Body:      Right upper body: No supraclavicular or axillary adenopathy.      Left upper body: No supraclavicular or axillary adenopathy.   Skin:     General: Skin is warm and dry.      Capillary Refill:  Capillary refill takes less than 2 seconds.      Coloration: Skin is not jaundiced or pale.      Findings: No bruising, erythema or rash.      Nails: There is no clubbing.   Neurological:      Mental Status: She is alert and oriented to person, place, and time.      Motor: No weakness.      Coordination: Coordination normal.      Gait: Gait normal.   Psychiatric:         Mood and Affect: Mood normal.         Speech: Speech normal.         Behavior: Behavior normal.         Thought Content: Thought content normal.         Judgment: Judgment normal.            Assessment and Plan     1. Breast cancer metastasized to axillary lymph node, right  -     CBC Oncology; Future  -     Comprehensive Metabolic Panel; Future    2. Multinodular thyroid    3. S/p TLH/BSO/USLS/MUS/Cystoscopy 11/21/22    4. Anxiety and depression    5. Aromatase inhibitor use  -     DXA Bone Density Axial Skeleton 1 or more sites; Future; Expected date: 02/22/2024                Overall doing well and clinically CHUY  Continue Letrozole.   Continue Vit D   BMD due  Colonoscopy 2031  Message to Sarah for tattoo info  Thyroid US this evening is scheduled.   Reassurance given.     RTC 3 months- knows to call for any issues        Route Chart for Scheduling    Med Onc Chart Routing      Follow up with physician 3 months. cbc, cmp and BMD   Follow up with SHAUN    Infusion scheduling note    Injection scheduling note    Labs    Imaging    Pharmacy appointment    Other referrals                          Patient is in agreement with the proposed treatment plan. All questions were answered to the patient's satisfaction. Pt knows to call clinic for any new or worsening symptoms and if anything is needed before the next clinic visit.    Jorge Ely, MSN, APRN, FNP-C  Nurse Practitioner to Dr. Nelia Frazier  Lead [facility] for [facility]  [facility]  Hematology & Medical Oncology  [address] [place] [id number]  ph.  [phone] ext [phone]  Fax. [phone]              30 minutes of total time spent on the encounter, which includes face to face time and non-face to face time preparing to see the patient (eg, review of tests), Obtaining and/or reviewing separately obtained history, Documenting clinical information in the electronic or other health record, Independently interpreting results (not separately reported) and communicating results to the patient/family/caregiver, or Care coordination (not separately reported).

## 2024-02-23 ENCOUNTER — TELEPHONE (OUTPATIENT)
Dept: SURGERY | Facility: CLINIC | Age: 59
End: 2024-02-23
Payer: COMMERCIAL

## 2024-02-23 NOTE — TELEPHONE ENCOUNTER
----- Message from Sarah Garcia PA-C sent at 2/23/2024  8:27 AM CST -----  Can remember if I sent this you but sending again!  ----- Message -----  From: Jorge Ely NP  Sent: 2/22/2024  12:04 PM CST  To: Sarah Garcia PA-C    Hi there! Can you get her in for tattooing. thanks

## 2024-02-28 ENCOUNTER — PATIENT MESSAGE (OUTPATIENT)
Dept: SURGERY | Facility: CLINIC | Age: 59
End: 2024-02-28
Payer: COMMERCIAL

## 2024-03-12 ENCOUNTER — OFFICE VISIT (OUTPATIENT)
Dept: SURGERY | Facility: CLINIC | Age: 59
End: 2024-03-12
Payer: COMMERCIAL

## 2024-03-12 VITALS
HEIGHT: 64 IN | DIASTOLIC BLOOD PRESSURE: 84 MMHG | WEIGHT: 136 LBS | SYSTOLIC BLOOD PRESSURE: 133 MMHG | BODY MASS INDEX: 23.22 KG/M2 | HEART RATE: 83 BPM

## 2024-03-12 DIAGNOSIS — Z90.13 S/P MASTECTOMY, BILATERAL: ICD-10-CM

## 2024-03-12 DIAGNOSIS — L81.8 TATTOOS: ICD-10-CM

## 2024-03-12 DIAGNOSIS — Z85.3 PERSONAL HISTORY OF BREAST CANCER: Primary | ICD-10-CM

## 2024-03-12 PROCEDURE — 99203 OFFICE O/P NEW LOW 30 MIN: CPT | Mod: S$GLB,,, | Performed by: PHYSICIAN ASSISTANT

## 2024-03-12 PROCEDURE — 99999 PR PBB SHADOW E&M-EST. PATIENT-LVL III: CPT | Mod: PBBFAC,,, | Performed by: PHYSICIAN ASSISTANT

## 2024-03-12 PROCEDURE — 3079F DIAST BP 80-89 MM HG: CPT | Mod: CPTII,S$GLB,, | Performed by: PHYSICIAN ASSISTANT

## 2024-03-12 PROCEDURE — 3075F SYST BP GE 130 - 139MM HG: CPT | Mod: CPTII,S$GLB,, | Performed by: PHYSICIAN ASSISTANT

## 2024-03-12 PROCEDURE — 3008F BODY MASS INDEX DOCD: CPT | Mod: CPTII,S$GLB,, | Performed by: PHYSICIAN ASSISTANT

## 2024-03-12 PROCEDURE — 1159F MED LIST DOCD IN RCRD: CPT | Mod: CPTII,S$GLB,, | Performed by: PHYSICIAN ASSISTANT

## 2024-03-12 NOTE — PROGRESS NOTES
[facility] [place]  Department of Surgery      Subjective:     Corinne Morrison Marcus presents the [facility] on 3/12/2024 for consultation regarding 3 dimensional nipple tattoo for breast reconstruction. She is status post bilateral breast reconstruction using autologous tissue by Dr. Jimenez. Her last surgery was 8/2022. She has a nice result and is pleased with the results of her breast reconstruction. She denies fever, chills, nausea, vomiting, chest pain or shortness of breath.     Review of patient's allergies indicates:  No Known Allergies  Current Outpatient Medications on File Prior to Visit   Medication Sig Dispense Refill    atorvastatin (LIPITOR) 40 MG tablet TAKE 1 TABLET DAILY 90 tablet 3    cetirizine (ZYRTEC) 5 MG tablet Take 5 mg by mouth once daily.      clindamycin-benzoyl peroxide gel APPLY TOPICALLY TO ACNE AREA EVERY DAY AT NIGHT. MAY DISCOLOR SHEETS AND TOWELS      fluticasone (FLONASE) 50 mcg/actuation nasal spray 1 spray by Each Nare route once daily.      letrozole (FEMARA) 2.5 mg Tab TAKE 1 TABLET DAILY 30 tablet 11    minoxidiL (LONITEN) 2.5 MG tablet Take 2.5 mg by mouth.      ondansetron (ZOFRAN-ODT) 4 MG TbDL Take 2 tablets (8 mg total) by mouth 2 (two) times daily. 15 tablet 3    sertraline (ZOLOFT) 100 MG tablet Take 2 tablets (200 mg total) by mouth once daily. 180 tablet 2    spironolactone (ALDACTONE) 100 MG tablet Take 100 mg by mouth every morning.      traZODone (DESYREL) 50 MG tablet Take 2 tablets (100 mg total) by mouth every evening. 180 tablet 3     No current facility-administered medications on file prior to visit.     Patient Active Problem List   Diagnosis    Multinodular thyroid    Hypercholesterolemia    YELITZA (stress urinary incontinence, female)    Anxiety and depression    Solitary lung nodule    Grief reaction    Colon cancer screening    Macromastia    Breast cancer metastasized to axillary lymph node, right    Current moderate episode of major  depressive disorder without prior episode    Cystocele, midline    Rectocele, female    Urinary incontinence, mixed    S/p TLH/BSO/USLS/MUS/Cystoscopy 11/21/22    Vaginal atrophy    Menopausal symptom     Past Surgical History:   Procedure Laterality Date    APPENDECTOMY      BILATERAL MASTECTOMY Bilateral 06/17/2022    BILATERAL SALPINGO-OOPHORECTOMY (BSO) N/A 11/21/2022    Procedure: SALPINGO-OOPHORECTOMY, BILATERAL;  Surgeon: Sheree Holden MD;  Location: [facility];  Service: OB/GYN;  Laterality: N/A;    BREAST RECONSTRUCTION Bilateral 08/29/2022    COLONOSCOPY      4 as of 2015 [facility] at [facility], next due in 2025    COLONOSCOPY N/A 10/27/2021    Procedure: COLONOSCOPY;  Surgeon: Garland Whalen MD;  Location: [facility] ([address] [place] [address]);  Service: Colon and Rectal;  Laterality: N/A;  Covid test 10/24 [place], instructions sent to [id number]   10/25 arrival time confirmed with pt-rb    COLPOPEXY N/A 11/21/2022    Procedure: UTEROSACRAL LIGAMENT SUSPENSION;  Surgeon: Sheree Holden MD;  Location: [facility];  Service: OB/GYN;  Laterality: N/A;    COLPOSCOPY  11/19/2014    EXCISIONAL HEMORRHOIDECTOMY      INSERTION OF MIDURETHRAL SLING N/A 11/21/2022    Procedure: SLING, MIDURETHRAL;  Surgeon: Sheree Holden MD;  Location: [facility];  Service: OB/GYN;  Laterality: N/A;    LAPAROSCOPIC TOTAL HYSTERECTOMY N/A 11/21/2022    Procedure: HYSTERECTOMY, TOTAL, LAPAROSCOPIC;  Surgeon: Sheree Holden MD;  Location: [facility];  Service: OB/GYN;  Laterality: N/A;    TOTAL REDUCTION MAMMOPLASTY      TOTAL REDUCTION MAMMOPLASTY Bilateral 06/01/2022    Procedure: MAMMOPLASTY, REDUCTION;  Surgeon: Dex Gutierrez MD;  Location: [facility];  Service: Plastics;  Laterality: Bilateral;     Social History     Socioeconomic History    Marital status:     Number of children: 5   Occupational History    Occupation:      Comment:  - not practicing   Tobacco Use    Smoking status: Never    Smokeless tobacco: Never    Substance and Sexual Activity    Alcohol use: Yes     Comment: socially    Drug use: No    Sexual activity: Yes     Partners: Male     Birth control/protection: None   Social History Narrative    Originally from [place]    Living in [place] with family        Getting reg exercise     Social Determinants of Health     Financial Resource Strain: Medium Risk (12/1/2023)    Overall Financial Resource Strain (CARDIA)     Difficulty of Paying Living Expenses: Somewhat hard   Food Insecurity: No Food Insecurity (12/1/2023)    Hunger Vital Sign     Worried About Running Out of Food in the Last Year: Never true     Ran Out of Food in the Last Year: Never true   Transportation Needs: No Transportation Needs (12/1/2023)    PRAPARE - Transportation     Lack of Transportation (Medical): No     Lack of Transportation (Non-Medical): No   Physical Activity: Sufficiently Active (12/1/2023)    Exercise Vital Sign     Days of Exercise per Week: 5 days     Minutes of Exercise per Session: 90 min   Stress: Stress Concern Present (12/1/2023)    [employer] - Occupational Stress Questionnaire     Feeling of Stress : Very much   Social Connections: Unknown (12/1/2023)    Social Connection and Isolation Panel [NHANES]     Frequency of Communication with Friends and Family: More than three times a week     Frequency of Social Gatherings with Friends and Family: More than three times a week     Active Member of Clubs or Organizations: Yes     Attends Club or Organization Meetings: More than 4 times per year     Marital Status:    Housing Stability: Low Risk  (12/1/2023)    Housing Stability Vital Sign     Unable to Pay for Housing in the Last Year: No     Number of Places Lived in the Last Year: 1     Unstable Housing in the Last Year: No       ROS: status post breast reconstruction      Objective:     Vitals:    03/12/24 0836   BP: 133/84   Pulse: 83       PHYSICAL EXAMINATION:   Physical Exam   Vitals  reviewed.  Constitutional: She is oriented to person, place, and time.   HENT:   Head: Normocephalic and atraumatic.   Nose: Nose normal.   Eyes: Pupils are equal, round, and reactive to light. Right eye exhibits no discharge. Left eye exhibits no discharge.   Pulmonary/Chest: Effort normal and breath sounds normal. No stridor. No respiratory distress. She exhibits no mass, no tenderness and no edema. Right breast exhibits no mass, no skin change and no tenderness. Left breast exhibits no mass, no skin change and no tenderness. No breast swelling or bleeding. Breasts are symmetrical.       Abdominal: Normal appearance.   Genitourinary: No breast swelling or bleeding.   Neurological: She is alert and oriented to person, place, and time.   Skin: Skin is warm and dry.     Psychiatric: Her behavior is normal. Mood, judgment and thought content normal.        Assessment:     58 y.o. female status post bilateral mastectomy with TANVI flap reconstruction here to discuss options for 3D nipple tattoo.     Plan:     - Patient is pleased with the outcome of her breast reconstruction and is now interested in 3D nipple tattooing.   - Patient is a good candidate for medical tattooing. She is well healed from her last surgical procedure.   - Procedure discussed in detail with the patient as were the risks and possible complications. She understands that the risks include but are not limited to bleeding, scarring, infection, pain, numbness, asymmetry, deformity, allergic reaction, failure to take, infection, skin necrosis, wound dehiscence and need for second stage tattoo.   - Post op instructions were reviewed with verbal understanding. Print out provided.   - Patient would like to proceed, office staff to coordinate tattoo procedure date at patients convenience. All questions were answered. The patient was advised to call the clinic with any questions or concerns prior to their next visit.       Total time spent with the patient:  30.  20 minutes of face to face consultation and 10 minutes of chart review and coordination of care.

## 2024-03-13 ENCOUNTER — PATIENT MESSAGE (OUTPATIENT)
Dept: SURGERY | Facility: CLINIC | Age: 59
End: 2024-03-13
Payer: COMMERCIAL

## 2024-03-28 ENCOUNTER — PROCEDURE VISIT (OUTPATIENT)
Dept: SURGERY | Facility: CLINIC | Age: 59
End: 2024-03-28
Payer: COMMERCIAL

## 2024-03-28 DIAGNOSIS — L81.8 TATTOOS: ICD-10-CM

## 2024-03-28 DIAGNOSIS — Z42.8 ENCOUNTER FOR OTHER PLASTIC AND RECONSTRUCTIVE SURGERY FOLLOWING MEDICAL PROCEDURE OR HEALED INJURY: ICD-10-CM

## 2024-03-28 DIAGNOSIS — Z85.3 PERSONAL HISTORY OF BREAST CANCER: Primary | ICD-10-CM

## 2024-03-28 DIAGNOSIS — Z90.13 S/P MASTECTOMY, BILATERAL: ICD-10-CM

## 2024-03-28 PROCEDURE — 11921 CORRECT SKN COLOR 6.1-20.0CM: CPT | Mod: S$GLB,,, | Performed by: PHYSICIAN ASSISTANT

## 2024-03-28 PROCEDURE — 11922 CORRECT SKIN COLOR EA 20.0CM: CPT | Mod: S$GLB,,, | Performed by: PHYSICIAN ASSISTANT

## 2024-04-08 NOTE — PROCEDURES
[facility]  Department of Surgery        REFERRING PROVIDER: No referring provider defined for this encounter.    Chief Complaint: No chief complaint on file.    Corinne Morrison Marcus presents to Breast Surgery Clinic on 3/28/2024 for bilateral 3 dimensional nipple tattoo for breast reconstruction. She is doing well today with no issue since her last visit. All incisions well healed. She denies pain, fever, chills, Nausea, vomiting, or other systemic signs of infection. She is pleased with the outcome of her breast reconstruction and would like to proceed with tattoos.     Review of patient's allergies indicates:  No Known Allergies  Current Outpatient Medications on File Prior to Visit   Medication Sig Dispense Refill    atorvastatin (LIPITOR) 40 MG tablet TAKE 1 TABLET DAILY 90 tablet 3    cetirizine (ZYRTEC) 5 MG tablet Take 5 mg by mouth once daily.      clindamycin-benzoyl peroxide gel APPLY TOPICALLY TO ACNE AREA EVERY DAY AT NIGHT. MAY DISCOLOR SHEETS AND TOWELS      fluticasone (FLONASE) 50 mcg/actuation nasal spray 1 spray by Each Nare route once daily.      letrozole (FEMARA) 2.5 mg Tab TAKE 1 TABLET DAILY 30 tablet 11    minoxidiL (LONITEN) 2.5 MG tablet Take 2.5 mg by mouth.      ondansetron (ZOFRAN-ODT) 4 MG TbDL Take 2 tablets (8 mg total) by mouth 2 (two) times daily. 15 tablet 3    sertraline (ZOLOFT) 100 MG tablet Take 2 tablets (200 mg total) by mouth once daily. 180 tablet 2    spironolactone (ALDACTONE) 100 MG tablet Take 100 mg by mouth every morning.      traZODone (DESYREL) 50 MG tablet Take 2 tablets (100 mg total) by mouth every evening. 180 tablet 3     No current facility-administered medications on file prior to visit.     Patient Active Problem List   Diagnosis    Multinodular thyroid    Hypercholesterolemia    YELITZA (stress urinary incontinence, female)    Anxiety and depression    Solitary lung nodule    Grief reaction    Colon cancer screening    Macromastia    Breast cancer  metastasized to axillary lymph node, right    Current moderate episode of major depressive disorder without prior episode    Cystocele, midline    Rectocele, female    Urinary incontinence, mixed    S/p TLH/BSO/USLS/MUS/Cystoscopy 11/21/22    Vaginal atrophy    Menopausal symptom     [unfilled]  Social History     Socioeconomic History    Marital status:     Number of children: 5   Occupational History    Occupation:      Comment:  - not practicing   Tobacco Use    Smoking status: Never    Smokeless tobacco: Never   Substance and Sexual Activity    Alcohol use: Yes     Comment: socially    Drug use: No    Sexual activity: Yes     Partners: Male     Birth control/protection: None   Social History Narrative    Originally from [place]    Living in [place] with family        Getting reg exercise     Social Determinants of Health     Financial Resource Strain: Medium Risk (12/1/2023)    Overall Financial Resource Strain (CARDIA)     Difficulty of Paying Living Expenses: Somewhat hard   Food Insecurity: No Food Insecurity (12/1/2023)    Hunger Vital Sign     Worried About Running Out of Food in the Last Year: Never true     Ran Out of Food in the Last Year: Never true   Transportation Needs: No Transportation Needs (12/1/2023)    PRAPARE - Transportation     Lack of Transportation (Medical): No     Lack of Transportation (Non-Medical): No   Physical Activity: Sufficiently Active (12/1/2023)    Exercise Vital Sign     Days of Exercise per Week: 5 days     Minutes of Exercise per Session: 90 min   Stress: Stress Concern Present (12/1/2023)    [employer] - Occupational Stress Questionnaire     Feeling of Stress : Very much   Social Connections: Unknown (12/1/2023)    Social Connection and Isolation Panel [NHANES]     Frequency of Communication with Friends and Family: More than three times a week     Frequency of Social Gatherings with Friends and Family: More than three times a  week     Active Member of Clubs or Organizations: Yes     Attends Club or Organization Meetings: More than 4 times per year     Marital Status:    Housing Stability: Low Risk  (12/1/2023)    Housing Stability Vital Sign     Unable to Pay for Housing in the Last Year: No     Number of Places Lived in the Last Year: 1     Unstable Housing in the Last Year: No         PROCEDURE NOTE     Procedure for 3 dimensional nipple tattooing was discussed in detail with the patient as were the risks and possible complications. She understands that the risks include but are not limited to bleeding, scarring, infection, pain, asymmetry, allergic reaction, failure to take and need for second stage tattoo. She understands that greater than 50% of patients require a second stage tattoo procedure for better saturation and 3 dimensional shading. After all questions were answered, the patient signed the consent form and that will be scanned into her medical record.     3.2 cm circular guide  was used for nipple areolar complex placement on both breast (6.4 cm total). The patient visualized placement in exam room mirror and agreed to proceed with placement. Colors for nipple and areola were mixed and tested on skin of breast, patient agreed to proceed with colors tested. Both breasts were then prepped with chlora prep. The nipple areola complexes were then tattooed using 3 dimensional technique. There was positive punctate bleeding noted. Breast were cleaned and a tegaderm was applied to tattoo.   Patient tolerated the procedure well. There were no complications.     Post op instructions and care were discussed in detail with the patient. Tegaderm to remain in place for 3 days then removed and washed with soap and water then apply AquaPhor or Eucerin to tattoos as needed for moisturizer twice daily. Patient voiced understanding. A written copy of post op care was also provided. All questions were answered. She will return to clinic  in 4 weeks.     The patient was advised to call the clinic with any questions or concerns prior to their next visit.       Sarah Garcia PA-C  Breast Surgery

## 2024-04-30 ENCOUNTER — HOSPITAL ENCOUNTER (OUTPATIENT)
Dept: RADIOLOGY | Facility: OTHER | Age: 59
Discharge: HOME | End: 2024-04-30
Attending: STUDENT IN AN ORGANIZED HEALTH CARE EDUCATION/TRAINING PROGRAM
Payer: COMMERCIAL

## 2024-04-30 DIAGNOSIS — E04.1 THYROID NODULE: ICD-10-CM

## 2024-04-30 PROCEDURE — 76536 US EXAM OF HEAD AND NECK: CPT | Mod: 26,,, | Performed by: RADIOLOGY

## 2024-04-30 PROCEDURE — 76536 US EXAM OF HEAD AND NECK: CPT | Mod: TC

## 2024-05-14 RX ORDER — NITROFURANTOIN 25; 75 MG/1; MG/1
CAPSULE ORAL
Refills: 0 | OUTPATIENT
Start: 2024-05-14

## 2024-05-14 NOTE — TELEPHONE ENCOUNTER
Refill Decision Note   Corinne Marcus  is requesting a refill authorization.  Brief Assessment and Rationale for Refill:  Quick Discontinue     Medication Therapy Plan:   Pharmacy is requesting new scripts for the following medications without required information, (sig/ frequency/qty/etc)         Comments: Pharmacies have been requesting medications for patients without required information, (sig, frequency, qty, etc.). In addition, requests are sent for medication(s) pt. are currently not taking, and medications patients have never taken.    We have spoken to the pharmacies about these request types and advised their teams previously that we are unable to assess these New Script requests and require all details for these requests. This is a known issue and has been reported.      Note composed:11:07 AM 05/14/2024

## 2024-05-15 ENCOUNTER — OFFICE VISIT (OUTPATIENT)
Dept: UROGYNECOLOGY | Facility: CLINIC | Age: 59
End: 2024-05-15
Payer: COMMERCIAL

## 2024-05-15 VITALS
DIASTOLIC BLOOD PRESSURE: 90 MMHG | HEIGHT: 64 IN | WEIGHT: 134.06 LBS | SYSTOLIC BLOOD PRESSURE: 144 MMHG | BODY MASS INDEX: 22.89 KG/M2 | HEART RATE: 73 BPM

## 2024-05-15 DIAGNOSIS — N39.3 SUI (STRESS URINARY INCONTINENCE, FEMALE): Primary | ICD-10-CM

## 2024-05-15 DIAGNOSIS — N95.2 VAGINAL ATROPHY: ICD-10-CM

## 2024-05-15 PROCEDURE — 3080F DIAST BP >= 90 MM HG: CPT | Mod: CPTII,S$GLB,, | Performed by: PHYSICIAN ASSISTANT

## 2024-05-15 PROCEDURE — 3077F SYST BP >= 140 MM HG: CPT | Mod: CPTII,S$GLB,, | Performed by: PHYSICIAN ASSISTANT

## 2024-05-15 PROCEDURE — 1159F MED LIST DOCD IN RCRD: CPT | Mod: CPTII,S$GLB,, | Performed by: PHYSICIAN ASSISTANT

## 2024-05-15 PROCEDURE — 99999 PR PBB SHADOW E&M-EST. PATIENT-LVL III: CPT | Mod: PBBFAC,,, | Performed by: PHYSICIAN ASSISTANT

## 2024-05-15 PROCEDURE — 3008F BODY MASS INDEX DOCD: CPT | Mod: CPTII,S$GLB,, | Performed by: PHYSICIAN ASSISTANT

## 2024-05-15 PROCEDURE — 99212 OFFICE O/P EST SF 10 MIN: CPT | Mod: S$GLB,,, | Performed by: PHYSICIAN ASSISTANT

## 2024-05-15 NOTE — PATIENT INSTRUCTIONS
Postop: well-healed. Continue normal activities.    Mild vaginal dryness:  Restart Good clean love or other non-hormonal vaginal moisturizer. Discuss with Dr. Marino.  Treating may help reduce UTIs and bladder urgency/frequency and reduce risk of mesh erosion.   Always try to minimize straining with BMs.   Do not push to pee, always relax pelvic floor to empty completely  She will follow up with us as needed

## 2024-05-15 NOTE — PROGRESS NOTES
[facility], [place] - UROGYNECOLOGY  [address] [address]  May 15, 2024     Corinne Morrison Marcus  [id number]  1965    UROGYN FOLLOW-UP  5/15/2024     58 y.o. female,   presents for urogyn follow up, 1 year PO   .     Date of Operation: 2022  Title of Operation:  1)  Total laparoscopic hysterectomy with bilateral salpingo-oophorectomy  2)  Laparoscopic bilateral uterosacral vaginal vault suspension  3)  Placement of retropubic tension-free midurethral sling, Advantage Fit (Apttus)  4)  Cystourethroscopy     Indications for Surgery:  1)  UI:  + YELITZA (tennis, cough, sneeze) = (+) UUI (standing/spontaneous, walking)  X 14years.  (+) pads:2/day, usually severe wetness (should use more/day) and 0/night, does not endorse usual pads at night.  Daytime frequency: Q 1 hours.  Nocturia: No: 1/night.   (--) dysuria,  (--) hematuria,  (--) frequent UTIs, used to have frequent UTIs 8-10 yrs ago. + complete bladder emptying.   --was Rx'd oxybutynin but never tried  --has tried Kegels--didn't help  +YELITZA on UDS 11-3-2022     2)  POP:  Absent.  (--) vaginal bleeding. (--) vaginal discharge. (--) sexually active.  (--) dyspareunia. (--)  Vaginal dryness.  (--) vaginal estrogen use.   --POP-Q:  Aa -1; Ba -1; C -5; Ap -1; Bp -1; D -7.  Genital hiatus 3, perineal body 2, total vaginal length 10-11.     3)  BM:  (--) constipation/straining. Take 4 senekot a night.  (--) chronic diarrhea. (--) hematochezia.  (--) fecal incontinence.  (--) fecal smearing/urgency.  (--) complete evacuation.   --sees Dr. Solorzano      4)  Desires hysterectomy:  --for risk reduction with h/o breast CA  2022 , estradiol 17  --discussed with Ned/Freddie and will proceed with BSO  --pelvic US 10/2022 normal     23 (6 week PO):  GYN:  no VB, discharge, pain.   Bladder issues: No UI, U/F, dysuria. +complete emptying.   Bowel issues: +h/o constipation.  Takes senekot/ducolax.    Derm: has a RL/mid  c-shaped area that blistered--healing with mupirocin   Menopause sx: stable HF; +NS worse since surgery.     5/8/23 (6 month PO):  GYN:  no VB, discharge, pain.   Bladder issues: No UI, dysuria. +complete emptying. +mild urgency.   Bowel issues: +h/o constipation.  Takes senekot/ducolax.    Derm: has a RL/mid c-shaped area that blistered--healed with mupirocin   Menopause sx: stable HF; +NS worse since surgery.     Changes from last visit (1.5 year PO):  GYN:  no issues, is not using vaginal moisturizer.   Bladder issues: none, she is active and not having any remarkable leakage, She is very happy with outcome from surgery. She does report experiencing feeling like she is not completely emptying her bladder. She is rushing to void (maybe pushing) then will sit for a few more seconds to empty the rest of the way.  But this is not bothersome to her. Denies UTI symptoms.   Bowel issues: Denies any bowel issues  Past Medical History:   Diagnosis Date    Abnormal Pap smear of cervix     Abnormal Pap smear of vagina     Anxiety     Breast cancer 06/08/2022    Cancer     Constipation     Depression     Hemorrhoids     HLD (hyperlipidemia)     Lung nodule     Multinodular goiter 2010    s/p bx of 2 nodules in 2011 - benign    PONV (postoperative nausea and vomiting)        Past Surgical History:   Procedure Laterality Date    APPENDECTOMY      BILATERAL MASTECTOMY Bilateral 06/17/2022    BILATERAL SALPINGO-OOPHORECTOMY (BSO) N/A 11/21/2022    Procedure: SALPINGO-OOPHORECTOMY, BILATERAL;  Surgeon: Sheree Holden MD;  Location: [facility] OR;  Service: OB/GYN;  Laterality: N/A;    BREAST RECONSTRUCTION Bilateral 08/29/2022    COLONOSCOPY      4 as of 2015 [facility] at [facility], next due in 2025    COLONOSCOPY N/A 10/27/2021    Procedure: COLONOSCOPY;  Surgeon: Garland Whalen MD;  Location: [facility] ([address] [place] [address]);  Service: Colon and Rectal;  Laterality: N/A;  Covid test 10/24 [place], instructions sent to [facility]   10/25 arrival  time confirmed with pt-rb    COLPOPEXY N/A 11/21/2022    Procedure: UTEROSACRAL LIGAMENT SUSPENSION;  Surgeon: Sheree Holden MD;  Location: [facility];  Service: OB/GYN;  Laterality: N/A;    COLPOSCOPY  11/19/2014    EXCISIONAL HEMORRHOIDECTOMY      INSERTION OF MIDURETHRAL SLING N/A 11/21/2022    Procedure: SLING, MIDURETHRAL;  Surgeon: Sheree Holden MD;  Location: [facility] OR;  Service: OB/GYN;  Laterality: N/A;    LAPAROSCOPIC TOTAL HYSTERECTOMY N/A 11/21/2022    Procedure: HYSTERECTOMY, TOTAL, LAPAROSCOPIC;  Surgeon: Sheree Holden MD;  Location: [facility] OR;  Service: OB/GYN;  Laterality: N/A;    TOTAL REDUCTION MAMMOPLASTY      TOTAL REDUCTION MAMMOPLASTY Bilateral 06/01/2022    Procedure: MAMMOPLASTY, REDUCTION;  Surgeon: Dex Gutierrez MD;  Location: [facility];  Service: Plastics;  Laterality: Bilateral;       Family History   Problem Relation Name Age of Onset    Breast cancer Maternal Grandmother  68    Hypertension Mother      Heart attack Mother      Lung cancer Father          lung, + tobacco    No Known Problems Brother      No Known Problems Daughter      Other Son          lymphedema    No Known Problems Son      No Known Problems Son      No Known Problems Daughter      Ovarian cancer Neg Hx      Colon cancer Neg Hx      Cancer Neg Hx         Social History     Socioeconomic History    Marital status:     Number of children: 5   Occupational History    Occupation:      Comment:  - not practicing   Tobacco Use    Smoking status: Never    Smokeless tobacco: Never   Substance and Sexual Activity    Alcohol use: Yes     Comment: socially    Drug use: No    Sexual activity: Yes     Partners: Male     Birth control/protection: None   Social History Narrative    Originally from [place]    Living in [place] with family        Getting reg exercise     Social Determinants of Health     Financial Resource Strain: Medium Risk (12/1/2023)    Overall Financial Resource Strain (CARDIA)      Difficulty of Paying Living Expenses: Somewhat hard   Food Insecurity: No Food Insecurity (12/1/2023)    Hunger Vital Sign     Worried About Running Out of Food in the Last Year: Never true     Ran Out of Food in the Last Year: Never true   Transportation Needs: No Transportation Needs (12/1/2023)    PRAPARE - Transportation     Lack of Transportation (Medical): No     Lack of Transportation (Non-Medical): No   Physical Activity: Sufficiently Active (12/1/2023)    Exercise Vital Sign     Days of Exercise per Week: 5 days     Minutes of Exercise per Session: 90 min   Stress: Stress Concern Present (12/1/2023)    [employer] [place] [place] [employer] - Occupational Stress Questionnaire     Feeling of Stress : Very much   Housing Stability: Low Risk  (12/1/2023)    Housing Stability Vital Sign     Unable to Pay for Housing in the Last Year: No     Number of Places Lived in the Last Year: 1     Unstable Housing in the Last Year: No       Current Outpatient Medications   Medication Sig Dispense Refill    atorvastatin (LIPITOR) 40 MG tablet TAKE 1 TABLET DAILY 90 tablet 3    cetirizine (ZYRTEC) 5 MG tablet Take 5 mg by mouth once daily.      clindamycin-benzoyl peroxide gel APPLY TOPICALLY TO ACNE AREA EVERY DAY AT NIGHT. MAY DISCOLOR SHEETS AND TOWELS      fluticasone (FLONASE) 50 mcg/actuation nasal spray 1 spray by Each Nare route once daily.      letrozole (FEMARA) 2.5 mg Tab TAKE 1 TABLET DAILY 30 tablet 11    minoxidiL (LONITEN) 2.5 MG tablet Take 2.5 mg by mouth.      ondansetron (ZOFRAN-ODT) 4 MG TbDL Take 2 tablets (8 mg total) by mouth 2 (two) times daily. 15 tablet 3    sertraline (ZOLOFT) 100 MG tablet Take 2 tablets (200 mg total) by mouth once daily. 180 tablet 2    spironolactone (ALDACTONE) 100 MG tablet Take 100 mg by mouth every morning.      traZODone (DESYREL) 50 MG tablet Take 2 tablets (100 mg total) by mouth every evening. 180 tablet 3     No current facility-administered medications for this  visit.       Review of patient's allergies indicates:  No Known Allergies    OB History          5    Para   5    Term   0            AB        Living             SAB        IAB        Ectopic        Multiple        Live Births                      ROS  As per HPI.      Physical Exam  LMP 2019   General: alert and oriented, no acute distress  Respiratory: normal respiratory effort  Abd: soft, non-tender, non-distended  Pelvic:  Ext. Genitalia: normal external genitalia. Normal bartholin's and skeens glands  Vagina: + atrophy. Normal vaginal mucosa without lesions. No discharge noted.   Non-tender bladder base without palpable mass.  Cervix: absent  Uterus:  surgically absent, vaginal cuff well healed   Urethra: no masses or tenderness  Urethral meatus: no lesions, caruncle or prolapse.    PVR 10 mls clear urine    Impression  1. s/p TLH/BSO, retropubic MUS on 2022        2. Vaginal atrophy          We reviewed the above issues and discussed options for short-term versus long-term management of her problems.     Plan:   Postop: well-healed. Continue normal activities.    Mild vaginal dryness:  Restart Good clean love or other non-hormonal vaginal moisturizer. Discuss with Dr. Marino.  Treating may help reduce UTIs and bladder urgency/frequency and reduce risk of mesh erosion.   Always try to minimize straining with BMs.   Do not push to pee, always relax pelvic floor to empty completely  She will follow up with us as needed    10 minutes were spent in face to face time with this patient  80 % of this time was spent in counseling and/or coordination of care    Kike Bonner PA-C  Division of Female Pelvic Medicine and Reconstructive Surgery  Department of Obstetrics & Gynecology  [facility] [place], [place]

## 2024-05-20 ENCOUNTER — PATIENT MESSAGE (OUTPATIENT)
Dept: OBSTETRICS AND GYNECOLOGY | Facility: CLINIC | Age: 59
End: 2024-05-20
Payer: COMMERCIAL

## 2024-05-21 ENCOUNTER — PATIENT MESSAGE (OUTPATIENT)
Dept: OBSTETRICS AND GYNECOLOGY | Facility: CLINIC | Age: 59
End: 2024-05-21
Payer: COMMERCIAL

## 2024-05-30 ENCOUNTER — PATIENT MESSAGE (OUTPATIENT)
Dept: HEMATOLOGY/ONCOLOGY | Facility: CLINIC | Age: 59
End: 2024-05-30
Payer: COMMERCIAL

## 2024-06-06 ENCOUNTER — OFFICE VISIT (OUTPATIENT)
Dept: OBSTETRICS AND GYNECOLOGY | Facility: CLINIC | Age: 59
End: 2024-06-06
Attending: OBSTETRICS & GYNECOLOGY
Payer: COMMERCIAL

## 2024-06-06 VITALS
HEART RATE: 77 BPM | SYSTOLIC BLOOD PRESSURE: 122 MMHG | DIASTOLIC BLOOD PRESSURE: 80 MMHG | BODY MASS INDEX: 23.57 KG/M2 | HEIGHT: 63 IN | WEIGHT: 133 LBS

## 2024-06-06 DIAGNOSIS — N39.3 SUI (STRESS URINARY INCONTINENCE, FEMALE): ICD-10-CM

## 2024-06-06 DIAGNOSIS — Z01.419 ENCOUNTER FOR GYNECOLOGICAL EXAMINATION WITHOUT ABNORMAL FINDING: Primary | ICD-10-CM

## 2024-06-06 DIAGNOSIS — C50.911 BREAST CANCER METASTASIZED TO AXILLARY LYMPH NODE, RIGHT: ICD-10-CM

## 2024-06-06 DIAGNOSIS — C77.3 BREAST CANCER METASTASIZED TO AXILLARY LYMPH NODE, RIGHT: ICD-10-CM

## 2024-06-06 PROCEDURE — 3008F BODY MASS INDEX DOCD: CPT | Mod: CPTII,S$GLB,, | Performed by: OBSTETRICS & GYNECOLOGY

## 2024-06-06 PROCEDURE — 3074F SYST BP LT 130 MM HG: CPT | Mod: CPTII,S$GLB,, | Performed by: OBSTETRICS & GYNECOLOGY

## 2024-06-06 PROCEDURE — 99999 PR PBB SHADOW E&M-EST. PATIENT-LVL III: CPT | Mod: PBBFAC,,, | Performed by: OBSTETRICS & GYNECOLOGY

## 2024-06-06 PROCEDURE — 1159F MED LIST DOCD IN RCRD: CPT | Mod: CPTII,S$GLB,, | Performed by: OBSTETRICS & GYNECOLOGY

## 2024-06-06 PROCEDURE — 99396 PREV VISIT EST AGE 40-64: CPT | Mod: S$GLB,,, | Performed by: OBSTETRICS & GYNECOLOGY

## 2024-06-06 PROCEDURE — 3079F DIAST BP 80-89 MM HG: CPT | Mod: CPTII,S$GLB,, | Performed by: OBSTETRICS & GYNECOLOGY

## 2024-06-06 RX ORDER — VALACYCLOVIR HYDROCHLORIDE 1 G/1
1000 TABLET, FILM COATED ORAL 2 TIMES DAILY
COMMUNITY
Start: 2024-05-21

## 2024-06-07 NOTE — PROGRESS NOTES
SUBJECTIVE:   58 y.o. female   for annual routine checkup. Patient's last menstrual period was 2019..  She reports incontinence is much better since her surgery.        Past Medical History:   Diagnosis Date    Abnormal Pap smear of cervix     Abnormal Pap smear of vagina     Anxiety     Breast cancer 2022    Cancer     Constipation     Depression     Hemorrhoids     HLD (hyperlipidemia)     Lung nodule     Multinodular goiter     s/p bx of 2 nodules in  - benign    PONV (postoperative nausea and vomiting)      Past Surgical History:   Procedure Laterality Date    APPENDECTOMY      BILATERAL MASTECTOMY Bilateral 2022    BILATERAL SALPINGO-OOPHORECTOMY (BSO) N/A 2022    Procedure: SALPINGO-OOPHORECTOMY, BILATERAL;  Surgeon: Sheree Holden MD;  Location: [facility] OR;  Service: OB/GYN;  Laterality: N/A;    BREAST RECONSTRUCTION Bilateral 2022    COLONOSCOPY      4 as of  [place] at [place], next due in     COLONOSCOPY N/A 10/27/2021    Procedure: COLONOSCOPY;  Surgeon: Garland Whalen MD;  Location: [facility] ([address] [place] [address]);  Service: Colon and Rectal;  Laterality: N/A;  Covid test 10/24 [place], instructions sent to [facility]   10/25 arrival time confirmed with pt-rb    COLPOPEXY N/A 2022    Procedure: UTEROSACRAL LIGAMENT SUSPENSION;  Surgeon: Sheree Holden MD;  Location: [facility] OR;  Service: OB/GYN;  Laterality: N/A;    COLPOSCOPY  2014    EXCISIONAL HEMORRHOIDECTOMY      INSERTION OF MIDURETHRAL SLING N/A 2022    Procedure: SLING, MIDURETHRAL;  Surgeon: Sheree Holden MD;  Location: [facility] OR;  Service: OB/GYN;  Laterality: N/A;    LAPAROSCOPIC TOTAL HYSTERECTOMY N/A 2022    Procedure: HYSTERECTOMY, TOTAL, LAPAROSCOPIC;  Surgeon: Sheree Holden MD;  Location: [facility] OR;  Service: OB/GYN;  Laterality: N/A;    TOTAL REDUCTION MAMMOPLASTY      TOTAL REDUCTION MAMMOPLASTY Bilateral 2022    Procedure: MAMMOPLASTY, REDUCTION;  Surgeon: Dex  Mae Gutierrez MD;  Location: [facility];  Service: Plastics;  Laterality: Bilateral;     Social History     Socioeconomic History    Marital status:     Number of children: 5   Occupational History    Occupation:      Comment:  - not practicing   Tobacco Use    Smoking status: Never    Smokeless tobacco: Never   Substance and Sexual Activity    Alcohol use: Yes     Comment: socially    Drug use: No    Sexual activity: Yes     Partners: Male     Birth control/protection: None   Social History Narrative    Originally from [place]    Living in [place] with family        Getting reg exercise     Social Determinants of Health     Financial Resource Strain: Medium Risk (12/1/2023)    Overall Financial Resource Strain (CARDIA)     Difficulty of Paying Living Expenses: Somewhat hard   Food Insecurity: No Food Insecurity (12/1/2023)    Hunger Vital Sign     Worried About Running Out of Food in the Last Year: Never true     Ran Out of Food in the Last Year: Never true   Transportation Needs: No Transportation Needs (12/1/2023)    PRAPARE - Transportation     Lack of Transportation (Medical): No     Lack of Transportation (Non-Medical): No   Physical Activity: Sufficiently Active (12/1/2023)    Exercise Vital Sign     Days of Exercise per Week: 5 days     Minutes of Exercise per Session: 90 min   Stress: Stress Concern Present (12/1/2023)    [employer] [place] [employer] - Occupational Stress Questionnaire     Feeling of Stress : Very much   Housing Stability: Low Risk  (12/1/2023)    Housing Stability Vital Sign     Unable to Pay for Housing in the Last Year: No     Number of Places Lived in the Last Year: 1     Unstable Housing in the Last Year: No     Family History   Problem Relation Name Age of Onset    Breast cancer Maternal Grandmother  68    Lung cancer Father          lung, + tobacco    Hypertension Mother      Heart attack Mother      No Known Problems Brother      No Known Problems  Daughter      No Known Problems Daughter      Other Son          lymphedema    No Known Problems Son      No Known Problems Son      Ovarian cancer Neg Hx      Colon cancer Neg Hx      Cancer Neg Hx      Uterine cancer Neg Hx      Cervical cancer Neg Hx       OB History    Para Term  AB Living   5 5 0         SAB IAB Ectopic Multiple Live Births                  # Outcome Date GA Lbr Charanjit/2nd Weight Sex Type Anes PTL Lv   5 Para      Vag-Spont      4 Para      Vag-Spont      3 Para      Vag-Spont      2 Para      Vag-Spont      1 Para      Vag-Spont              Current Outpatient Medications   Medication Sig Dispense Refill    valACYclovir (VALTREX) 1000 MG tablet Take 1,000 mg by mouth 2 (two) times daily.      atorvastatin (LIPITOR) 40 MG tablet TAKE 1 TABLET DAILY 90 tablet 3    cetirizine (ZYRTEC) 5 MG tablet Take 5 mg by mouth once daily.      clindamycin-benzoyl peroxide gel APPLY TOPICALLY TO ACNE AREA EVERY DAY AT NIGHT. MAY DISCOLOR SHEETS AND TOWELS      fluticasone (FLONASE) 50 mcg/actuation nasal spray 1 spray by Each Nare route once daily.      letrozole (FEMARA) 2.5 mg Tab TAKE 1 TABLET DAILY 30 tablet 11    minoxidiL (LONITEN) 2.5 MG tablet Take 2.5 mg by mouth.      ondansetron (ZOFRAN-ODT) 4 MG TbDL Take 2 tablets (8 mg total) by mouth 2 (two) times daily. 15 tablet 3    sertraline (ZOLOFT) 100 MG tablet Take 2 tablets (200 mg total) by mouth once daily. 180 tablet 2    spironolactone (ALDACTONE) 100 MG tablet Take 100 mg by mouth every morning.      traZODone (DESYREL) 50 MG tablet Take 2 tablets (100 mg total) by mouth every evening. 180 tablet 3     No current facility-administered medications for this visit.     Allergies: Patient has no known allergies.     The 10-year ASCVD risk score (Gavino DK, et al., 2019) is: 2.2%    Values used to calculate the score:      Age: 58 years      Sex: Female      Is Non- : No      Diabetic: No      Tobacco smoker: No       Systolic Blood Pressure: 122 mmHg      Is BP treated: No      HDL Cholesterol: 72 mg/dL      Total Cholesterol: 225 mg/dL      ROS:  Constitutional: no weight loss, weight gain, fever, fatigue  Eyes:  No vision changes, glasses/contacts  ENT/Mouth: No ulcers, sinus problems, ears ringing, headache  Cardiovascular: No inability to lie flat, chest pain, exercise intolerance, swelling, heart palpitations  Respiratory: No wheezing, coughing blood, shortness of breath, or cough  Gastrointestinal: No diarrhea, bloody stool, nausea/vomiting, constipation, gas, hemorrhoids  Genitourinary: No blood in urine, painful urination, urgency of urination, frequency of urination, incomplete emptying, incontinence, abnormal bleeding, painful periods, heavy periods, vaginal discharge, vaginal odor, painful intercourse, sexual problems, bleeding after intercourse.  Musculoskeletal: No muscle weakness  Skin/Breast: No painful breasts, nipple discharge, masses, rash, ulcers  Neurological: No passing out, seizures, numbness, headache  Endocrine: No diabetes, hypothyroid, hyperthyroid, hot flashes, hair loss, abnormal hair growth, acne  Psychiatric: No depression, crying  Hematologic: No bruises, bleeding, swollen lymph nodes, anemia.      Physical Exam:   Constitutional: She is oriented to person, place, and time. She appears well-developed and well-nourished.      Neck: No tracheal deviation present. No thyromegaly present.    Cardiovascular:       Exam reveals no edema.        Pulmonary/Chest: Effort normal. She exhibits no mass, no tenderness, no deformity and no retraction. Right breast exhibits no inverted nipple, no mass, no nipple discharge, no skin change, no tenderness, presence, no bleeding and no swelling. Left breast exhibits no inverted nipple, no mass, no nipple discharge, no skin change, no tenderness, presence, no bleeding and no swelling. Breasts are symmetrical.        Abdominal: Soft. She exhibits no distension and no  mass. There is no abdominal tenderness. There is no rebound and no guarding. No hernia. Hernia confirmed negative in the left inguinal area.     Genitourinary: Rectum:      No external hemorrhoid.   There is no rash, tenderness or lesion on the right labia. There is no rash, tenderness or lesion on the left labia. No no adexnal prolapse. Right adnexum displays no mass, no tenderness and no fullness. Left adnexum displays no mass, no tenderness and no fullness. No vaginal discharge, tenderness, bleeding, rectocele, cystocele or prolapse of vaginal walls in the vagina. Cervix is absent.Uterus is absent.           Musculoskeletal: Normal range of motion and moves all extremeties. No edema.       Neurological: She is alert and oriented to person, place, and time.    Skin: No rash noted. No erythema. No pallor.    Psychiatric: She has a normal mood and affect. Her behavior is normal. Judgment and thought content normal.     S/p bilateral mastectomy with reconstruction  +vaginal atrophy    ASSESSMENT:   well woman  History of breast cancer  Vaginal atrophy  PLAN:   Counseled her on use of hyaluronic acid suppositories  Counseled her on safety and efficacy of intravaginal estrogen. Will consider in the future.   return annually or prn

## 2024-07-24 ENCOUNTER — TELEPHONE (OUTPATIENT)
Dept: INTERNAL MEDICINE | Facility: CLINIC | Age: 59
End: 2024-07-24
Payer: COMMERCIAL

## 2024-07-24 ENCOUNTER — LAB VISIT (OUTPATIENT)
Dept: LAB | Facility: OTHER | Age: 59
End: 2024-07-24
Attending: RADIOLOGY
Payer: COMMERCIAL

## 2024-07-24 DIAGNOSIS — Q79.59: ICD-10-CM

## 2024-07-24 DIAGNOSIS — L90.5 SCAR OF SKIN: ICD-10-CM

## 2024-07-24 DIAGNOSIS — Z01.818 PRE-OP TESTING: Primary | ICD-10-CM

## 2024-07-24 DIAGNOSIS — Z85.3 PERSONAL HISTORY OF BREAST CANCER: ICD-10-CM

## 2024-07-24 DIAGNOSIS — Z90.13 ABSENCE OF BREAST, ACQUIRED, BILATERAL: ICD-10-CM

## 2024-07-24 DIAGNOSIS — Z01.812 PRE-OPERATIVE LABORATORY EXAMINATION: Primary | ICD-10-CM

## 2024-07-24 LAB
ANION GAP SERPL CALC-SCNC: 11 MMOL/L (ref 8–16)
BUN SERPL-MCNC: 14 MG/DL (ref 6–20)
CALCIUM SERPL-MCNC: 10.3 MG/DL (ref 8.7–10.5)
CHLORIDE SERPL-SCNC: 105 MMOL/L (ref 95–110)
CO2 SERPL-SCNC: 25 MMOL/L (ref 23–29)
CREAT SERPL-MCNC: 0.9 MG/DL (ref 0.5–1.4)
ERYTHROCYTE [DISTWIDTH] IN BLOOD BY AUTOMATED COUNT: 12.3 % (ref 11.5–14.5)
EST. GFR  (NO RACE VARIABLE): >60 ML/MIN/1.73 M^2
GLUCOSE SERPL-MCNC: 40 MG/DL (ref 70–110)
HCT VFR BLD AUTO: 40.1 % (ref 37–48.5)
HGB BLD-MCNC: 13.4 G/DL (ref 12–16)
MCH RBC QN AUTO: 30 PG (ref 27–31)
MCHC RBC AUTO-ENTMCNC: 33.4 G/DL (ref 32–36)
MCV RBC AUTO: 90 FL (ref 82–98)
PLATELET # BLD AUTO: 326 K/UL (ref 150–450)
PMV BLD AUTO: 9 FL (ref 9.2–12.9)
POTASSIUM SERPL-SCNC: 4.7 MMOL/L (ref 3.5–5.1)
RBC # BLD AUTO: 4.46 M/UL (ref 4–5.4)
SODIUM SERPL-SCNC: 141 MMOL/L (ref 136–145)
WBC # BLD AUTO: 9.03 K/UL (ref 3.9–12.7)

## 2024-07-24 PROCEDURE — 87086 URINE CULTURE/COLONY COUNT: CPT | Performed by: RADIOLOGY

## 2024-07-24 PROCEDURE — 85027 COMPLETE CBC AUTOMATED: CPT | Performed by: RADIOLOGY

## 2024-07-24 PROCEDURE — 80048 BASIC METABOLIC PNL TOTAL CA: CPT | Performed by: RADIOLOGY

## 2024-07-24 PROCEDURE — 36415 COLL VENOUS BLD VENIPUNCTURE: CPT | Performed by: RADIOLOGY

## 2024-07-26 ENCOUNTER — HOSPITAL ENCOUNTER (OUTPATIENT)
Dept: CARDIOLOGY | Facility: OTHER | Age: 59
Discharge: HOME | End: 2024-07-26
Attending: STUDENT IN AN ORGANIZED HEALTH CARE EDUCATION/TRAINING PROGRAM
Payer: COMMERCIAL

## 2024-07-26 LAB
BACTERIA UR CULT: NORMAL
BACTERIA UR CULT: NORMAL

## 2024-08-07 ENCOUNTER — PATIENT MESSAGE (OUTPATIENT)
Dept: INTERNAL MEDICINE | Facility: CLINIC | Age: 59
End: 2024-08-07
Payer: COMMERCIAL

## 2024-08-07 DIAGNOSIS — R11.0 NAUSEA: ICD-10-CM

## 2024-08-08 RX ORDER — ONDANSETRON 4 MG/1
8 TABLET, ORALLY DISINTEGRATING ORAL 2 TIMES DAILY
Qty: 15 TABLET | Refills: 3 | Status: SHIPPED | OUTPATIENT
Start: 2024-08-08

## 2024-08-22 ENCOUNTER — PATIENT MESSAGE (OUTPATIENT)
Dept: HEMATOLOGY/ONCOLOGY | Facility: CLINIC | Age: 59
End: 2024-08-22
Payer: COMMERCIAL

## 2024-09-04 ENCOUNTER — PATIENT MESSAGE (OUTPATIENT)
Dept: INTERNAL MEDICINE | Facility: CLINIC | Age: 59
End: 2024-09-04
Payer: COMMERCIAL

## 2024-10-06 DIAGNOSIS — Z17.0 MALIGNANT NEOPLASM OF RIGHT BREAST IN FEMALE, ESTROGEN RECEPTOR POSITIVE, UNSPECIFIED SITE OF BREAST: ICD-10-CM

## 2024-10-06 DIAGNOSIS — F32.A DEPRESSION, UNSPECIFIED DEPRESSION TYPE: ICD-10-CM

## 2024-10-06 DIAGNOSIS — C50.911 MALIGNANT NEOPLASM OF RIGHT BREAST IN FEMALE, ESTROGEN RECEPTOR POSITIVE, UNSPECIFIED SITE OF BREAST: ICD-10-CM

## 2024-10-07 RX ORDER — SERTRALINE HYDROCHLORIDE 100 MG/1
200 TABLET, FILM COATED ORAL
Qty: 180 TABLET | Refills: 1 | Status: SHIPPED | OUTPATIENT
Start: 2024-10-07

## 2024-10-07 RX ORDER — LETROZOLE 2.5 MG/1
TABLET, FILM COATED ORAL
Qty: 90 TABLET | Refills: 3 | Status: SHIPPED | OUTPATIENT
Start: 2024-10-07

## 2024-10-07 NOTE — TELEPHONE ENCOUNTER
No care due was identified.  [facility] Embedded Care Due Messages. Reference number: [id number].   10/06/2024 11:27:16 PM CDT

## 2024-10-07 NOTE — TELEPHONE ENCOUNTER
Refill Decision Note   Corinne Marcus  is requesting a refill authorization.  Brief Assessment and Rationale for Refill:  Approve     Medication Therapy Plan:         Comments:     Note composed:2:42 AM 10/07/2024

## 2024-12-06 DIAGNOSIS — G47.00 INSOMNIA, UNSPECIFIED TYPE: ICD-10-CM

## 2024-12-07 NOTE — TELEPHONE ENCOUNTER
Care Due:                  Date            Visit Type   Department     Provider  --------------------------------------------------------------------------------                                EP -                              PRIMARY      [facility] INTERNAL  Last Visit: 02-      CARE ([facility])   MEDICINE       Gene Sterling  Next Visit: None Scheduled  None         None Found                                                            Last  Test          Frequency    Reason                     Performed    Due Date  --------------------------------------------------------------------------------    CMP.........  12 months..  atorvastatin.............  02- 02-    Lipid Panel.  12 months..  atorvastatin.............  02- 02-    [employer] Embedded Care Due Messages. Reference number: [id number].   12/06/2024 11:52:48 PM CST

## 2024-12-09 ENCOUNTER — PATIENT MESSAGE (OUTPATIENT)
Dept: HEMATOLOGY/ONCOLOGY | Facility: CLINIC | Age: 59
End: 2024-12-09
Payer: COMMERCIAL

## 2024-12-09 RX ORDER — TRAZODONE HYDROCHLORIDE 50 MG/1
100 TABLET ORAL NIGHTLY
Qty: 180 TABLET | Refills: 3 | Status: SHIPPED | OUTPATIENT
Start: 2024-12-09

## 2024-12-09 NOTE — TELEPHONE ENCOUNTER
Refill Encounter    PCP Visits: Recent Visits  Date Type Provider Dept   02/08/24 Office Visit Gene Sterling MD [facility] Internal Medicine   Showing recent visits within past 360 days and meeting all other requirements  Future Appointments  No visits were found meeting these conditions.  Showing future appointments within next 720 days and meeting all other requirements     Last 3 Blood Pressure:   BP Readings from Last 3 Encounters:   06/06/24 122/80   05/15/24 (!) 144/90   03/12/24 133/84     [facility] [address] [id number] [address] [facility]  [address] [facility] [address]  Phone: [phone] Fax: [phone]    Requested RX:  Requested Prescriptions     Pending Prescriptions Disp Refills    traZODone (DESYREL) 50 MG tablet 180 tablet 3     Sig: Take 2 tablets (100 mg total) by mouth every evening.      RX Route: Normal

## 2024-12-26 ENCOUNTER — PATIENT MESSAGE (OUTPATIENT)
Dept: HEMATOLOGY/ONCOLOGY | Facility: CLINIC | Age: 59
End: 2024-12-26
Payer: COMMERCIAL

## 2025-01-27 ENCOUNTER — PATIENT MESSAGE (OUTPATIENT)
Dept: INTERNAL MEDICINE | Facility: CLINIC | Age: 60
End: 2025-01-27
Payer: COMMERCIAL

## 2025-01-28 ENCOUNTER — TELEPHONE (OUTPATIENT)
Dept: HEMATOLOGY/ONCOLOGY | Facility: CLINIC | Age: 60
End: 2025-01-28
Payer: COMMERCIAL

## 2025-01-31 ENCOUNTER — TELEPHONE (OUTPATIENT)
Dept: PHARMACY | Facility: CLINIC | Age: 60
End: 2025-01-31
Payer: COMMERCIAL

## 2025-02-01 NOTE — TELEPHONE ENCOUNTER
[facility]/Population Health Chart Review & Patient Outreach Details For Medication Adherence Project    Reason for Outreach Encounter: 3rd Party payor non-compliance report ([employer], BC[employer], C, etc)  2.  Patient Outreach Method: [employer] message  3.   Medication in question: atorvastatin   LAST FILLED: 8/9/24 for 90 day supply  Hyperlipidemia Medications               atorvastatin (LIPITOR) 40 MG tablet TAKE 1 TABLET DAILY               4.  Reviewed and or Updates Made To: Patient Chart  5. Outreach Outcomes and/or actions taken: Sent inquiry to patient: Waiting for response.

## 2025-02-11 ENCOUNTER — LAB VISIT (OUTPATIENT)
Dept: LAB | Facility: HOSPITAL | Age: 60
End: 2025-02-11
Attending: NURSE PRACTITIONER
Payer: COMMERCIAL

## 2025-02-11 ENCOUNTER — OFFICE VISIT (OUTPATIENT)
Dept: HEMATOLOGY/ONCOLOGY | Facility: CLINIC | Age: 60
End: 2025-02-11
Payer: COMMERCIAL

## 2025-02-11 VITALS
OXYGEN SATURATION: 97 % | SYSTOLIC BLOOD PRESSURE: 125 MMHG | BODY MASS INDEX: 22.89 KG/M2 | WEIGHT: 134.06 LBS | DIASTOLIC BLOOD PRESSURE: 74 MMHG | HEART RATE: 86 BPM | TEMPERATURE: 99 F | HEIGHT: 64 IN | RESPIRATION RATE: 17 BRPM

## 2025-02-11 DIAGNOSIS — C50.911 BREAST CANCER METASTASIZED TO AXILLARY LYMPH NODE, RIGHT: Primary | ICD-10-CM

## 2025-02-11 DIAGNOSIS — C77.3 BREAST CANCER METASTASIZED TO AXILLARY LYMPH NODE, RIGHT: ICD-10-CM

## 2025-02-11 DIAGNOSIS — F32.A ANXIETY AND DEPRESSION: ICD-10-CM

## 2025-02-11 DIAGNOSIS — Z90.710 S/P LAPAROSCOPIC HYSTERECTOMY: ICD-10-CM

## 2025-02-11 DIAGNOSIS — C50.911 MALIGNANT NEOPLASM OF RIGHT BREAST IN FEMALE, ESTROGEN RECEPTOR POSITIVE, UNSPECIFIED SITE OF BREAST: ICD-10-CM

## 2025-02-11 DIAGNOSIS — E55.9 VITAMIN D DEFICIENCY: ICD-10-CM

## 2025-02-11 DIAGNOSIS — C77.3 BREAST CANCER METASTASIZED TO AXILLARY LYMPH NODE, RIGHT: Primary | ICD-10-CM

## 2025-02-11 DIAGNOSIS — Z17.0 MALIGNANT NEOPLASM OF RIGHT BREAST IN FEMALE, ESTROGEN RECEPTOR POSITIVE, UNSPECIFIED SITE OF BREAST: ICD-10-CM

## 2025-02-11 DIAGNOSIS — C50.911 BREAST CANCER METASTASIZED TO AXILLARY LYMPH NODE, RIGHT: ICD-10-CM

## 2025-02-11 DIAGNOSIS — Z79.811 AROMATASE INHIBITOR USE: ICD-10-CM

## 2025-02-11 DIAGNOSIS — F41.9 ANXIETY AND DEPRESSION: ICD-10-CM

## 2025-02-11 LAB
ALBUMIN SERPL BCP-MCNC: 4.2 G/DL (ref 3.5–5.2)
ALP SERPL-CCNC: 84 U/L (ref 40–150)
ALT SERPL W/O P-5'-P-CCNC: 19 U/L (ref 10–44)
ANION GAP SERPL CALC-SCNC: 15 MMOL/L (ref 8–16)
AST SERPL-CCNC: 25 U/L (ref 10–40)
BILIRUB SERPL-MCNC: 0.5 MG/DL (ref 0.1–1)
BUN SERPL-MCNC: 18 MG/DL (ref 6–20)
CALCIUM SERPL-MCNC: 9.9 MG/DL (ref 8.7–10.5)
CHLORIDE SERPL-SCNC: 99 MMOL/L (ref 95–110)
CO2 SERPL-SCNC: 22 MMOL/L (ref 23–29)
CREAT SERPL-MCNC: 0.9 MG/DL (ref 0.5–1.4)
ERYTHROCYTE [DISTWIDTH] IN BLOOD BY AUTOMATED COUNT: 12.6 % (ref 11.5–14.5)
EST. GFR  (NO RACE VARIABLE): >60 ML/MIN/1.73 M^2
GLUCOSE SERPL-MCNC: 95 MG/DL (ref 70–110)
HCT VFR BLD AUTO: 39.7 % (ref 37–48.5)
HGB BLD-MCNC: 13.8 G/DL (ref 12–16)
IMM GRANULOCYTES # BLD AUTO: 0.03 K/UL (ref 0–0.04)
MCH RBC QN AUTO: 30.7 PG (ref 27–31)
MCHC RBC AUTO-ENTMCNC: 34.8 G/DL (ref 32–36)
MCV RBC AUTO: 88 FL (ref 82–98)
NEUTROPHILS # BLD AUTO: 6.5 K/UL (ref 1.8–7.7)
PLATELET # BLD AUTO: 372 K/UL (ref 150–450)
PMV BLD AUTO: 9 FL (ref 9.2–12.9)
POTASSIUM SERPL-SCNC: 5.1 MMOL/L (ref 3.5–5.1)
PROT SERPL-MCNC: 7.9 G/DL (ref 6–8.4)
RBC # BLD AUTO: 4.49 M/UL (ref 4–5.4)
SODIUM SERPL-SCNC: 136 MMOL/L (ref 136–145)
WBC # BLD AUTO: 8.98 K/UL (ref 3.9–12.7)

## 2025-02-11 PROCEDURE — 3078F DIAST BP <80 MM HG: CPT | Mod: CPTII,S$GLB,, | Performed by: NURSE PRACTITIONER

## 2025-02-11 PROCEDURE — 80053 COMPREHEN METABOLIC PANEL: CPT | Performed by: NURSE PRACTITIONER

## 2025-02-11 PROCEDURE — 3008F BODY MASS INDEX DOCD: CPT | Mod: CPTII,S$GLB,, | Performed by: NURSE PRACTITIONER

## 2025-02-11 PROCEDURE — 99999 PR PBB SHADOW E&M-EST. PATIENT-LVL IV: CPT | Mod: PBBFAC,,, | Performed by: NURSE PRACTITIONER

## 2025-02-11 PROCEDURE — 1159F MED LIST DOCD IN RCRD: CPT | Mod: CPTII,S$GLB,, | Performed by: NURSE PRACTITIONER

## 2025-02-11 PROCEDURE — 99215 OFFICE O/P EST HI 40 MIN: CPT | Mod: S$GLB,,, | Performed by: NURSE PRACTITIONER

## 2025-02-11 PROCEDURE — 36415 COLL VENOUS BLD VENIPUNCTURE: CPT | Performed by: NURSE PRACTITIONER

## 2025-02-11 PROCEDURE — G2211 COMPLEX E/M VISIT ADD ON: HCPCS | Mod: S$GLB,,, | Performed by: NURSE PRACTITIONER

## 2025-02-11 PROCEDURE — 85027 COMPLETE CBC AUTOMATED: CPT | Performed by: NURSE PRACTITIONER

## 2025-02-11 PROCEDURE — 3074F SYST BP LT 130 MM HG: CPT | Mod: CPTII,S$GLB,, | Performed by: NURSE PRACTITIONER

## 2025-02-11 NOTE — PROGRESS NOTES
Subjective     Patient ID: Corinne Morrison Marcus is a 59 y.o. female.    Chief Complaint: Breast cancer metastasized to axillary lymph node, right    HPI    Returns for follow up  Overall feeling good physically  No pain issues  No breast concerns.   No CP/SOB  Stays active- plays tennis and pickle ball.   Taking Vit D  Increased stressors- seeing therapist.   Pcp appt this week.             Oncology History:  Patient is a  59-year-old female w/ hx of HLD, depression, who underwent bilateral breast reduction due to macromastia.  Incidentally patient was found to have right breast invasive carcinoma during resection. Subsequently underwent b/l mastectomy with reconstruction + R SLNB and R ALND on 6/17/22.      Prior screening mammograms w/o mass.   MRI breast without any obvious masses or lymph node involvement.     Tumor size:  1.8  Tumor type:  Right breast invasive carcinoma  Tumor rdgrdrrdarddrderd:rd rd3rd Lymph node status: 1/3 sentinel LN   Hormone status:  ER +, MA +  HER2 Kendra status: -ve  Other:  Rare focus of lymphovascular invasion     SH: Working as a laywer. . Three boys and two girls - 25, 23, 21, 19, 14.  Gyn Hx: Menses 15 y/o, post-menopausal 54 y/o  FH:  had metastatic melanoma, Maternal grandmother - breast ca 69 y/o  Paternal aunt - breast ca 49 y/o           Labs confirmed post menopausal status  Revision surgery 8/2022 - did well.   Started letrozole mid 9/2022     - [employer] genetics testing negative    - 12/5/2022 PET scan:  FINDINGS:  Quality of the study: Adequate.  In the head and neck, there are no hypermetabolic lesions worrisome for malignancy. There are no hypermetabolic mucosal lesions, and there are no pathologically enlarged or hypermetabolic lymph nodes.  There is prominent physiologic uptake in the right masseter and bilateral pterygoid muscles and less prominent uptake in the right temporalis muscle.  In the chest, there are no hypermetabolic lesions worrisome for malignancy.  There  are no concerning pulmonary nodules or masses, and there are no pathologically enlarged or hypermetabolic lymph nodes.  There are postsurgical changes of bilateral mammoplasty and right axillary lymph node dissection without suspicious hypermetabolic or mass lesions.  In the abdomen and pelvis, there is physiologic tracer distribution within the abdominal organs and excretion into the genitourinary system.  In the bones, there are no hypermetabolic lesions worrisome for malignancy.  In the extremities, there are no hypermetabolic lesions worrisome for malignancy.  Focal retention of tracer near the left antecubital injection site.  Impression:  1.  No evidence of hypermetabolic tumor.  2.  Postsurgical changes of the breasts and right axilla.     - 11/17/2022 Dexa scan:  Normal bone mineral density.     - 10/16/2022 Transvaginal Ultrasound:  No sonographic abnormality.  No detrimental change from prior.      Previously,  underwent OSMEL/BSO- details below  Date of Operation: 11/21/2022  Title of Operation:  1)  Total laparoscopic hysterectomy with bilateral salpingo-oophorectomy  2)  Laparoscopic bilateral uterosacral vaginal vault suspension  3)  Placement of retropubic tension-free midurethral sling, [employer]age Fit ([employer])  4)  Cystourethroscopy  Recovery from hysterectomy was harder than she anticipated  Notes a blister- has topical antibiotic ointment and improving     Pathology:  UTERUS, CERVIX AND BILATERAL ADNEXA WEIGHING 103 G SHOWING:   INACTIVE ENDOMETRIUM WITH A SMALL BENIGN ENDOMETRIAL POLYP   THE CERVIX HAS NABOTHIAN CYSTS   SMALL INTRAMURAL AND SUBMUCOSAL LEIOMYOMAS ARE PRESENT   UNREMARKABLE LEFT AND RIGHT OVARIES   UNREMARKABLE LEFT AND RIGHT FALLOPIAN TUBES        Review of Systems   Constitutional:         See above   All other systems reviewed and are negative.         Objective     Physical Exam  Vitals and nursing note reviewed.   Constitutional:       General: She is not in acute  distress.     Appearance: Normal appearance. She is well-developed and normal weight.      Comments: Presents alone  Very pleasant   HENT:      Head: Normocephalic and atraumatic.   Eyes:      General: Lids are normal. No scleral icterus.     Extraocular Movements: Extraocular movements intact.      Conjunctiva/sclera: Conjunctivae normal.      Pupils: Pupils are equal, round, and reactive to light.   Neck:      Thyroid: No thyromegaly.      Vascular: No JVD.      Trachea: Trachea normal.   Cardiovascular:      Rate and Rhythm: Normal rate and regular rhythm.      Heart sounds: Normal heart sounds.   Pulmonary:      Effort: Pulmonary effort is normal. No respiratory distress.      Breath sounds: Normal breath sounds. No wheezing, rhonchi or rales.      Comments: Reconstructed breasts  No masses or lymphadenopathy  Abdominal:      General: Bowel sounds are normal. There is no distension.      Palpations: Abdomen is soft. There is no mass.      Tenderness: There is no abdominal tenderness. There is no rebound.      Comments: No organomegaly.      Musculoskeletal:         General: No swelling, tenderness or deformity. Normal range of motion.      Cervical back: Normal range of motion and neck supple.      Right lower leg: No edema.      Left lower leg: No edema.      Comments: No spinal or paraspinal tenderness.    Lymphadenopathy:      Head:      Right side of head: No submental or submandibular adenopathy.      Left side of head: No submental or submandibular adenopathy.      Cervical: No cervical adenopathy.      Upper Body:      Right upper body: No supraclavicular or axillary adenopathy.      Left upper body: No supraclavicular or axillary adenopathy.   Skin:     General: Skin is warm and dry.      Capillary Refill: Capillary refill takes less than 2 seconds.      Coloration: Skin is not jaundiced or pale.      Findings: No bruising, erythema or rash.      Nails: There is no clubbing.   Neurological:      Mental  Status: She is alert and oriented to person, place, and time.      Motor: No weakness.      Coordination: Coordination normal.      Gait: Gait normal.   Psychiatric:         Mood and Affect: Mood normal.         Speech: Speech normal.         Behavior: Behavior normal.         Thought Content: Thought content normal.         Judgment: Judgment normal.       Labs: reviewed     Assessment and Plan     1. Breast cancer metastasized to axillary lymph node, right  -     CBC Oncology; Future  -     Comprehensive Metabolic Panel; Future    2. Malignant neoplasm of right breast in female, estrogen receptor positive, unspecified site of breast    3. Aromatase inhibitor use    4. S/p TLH/BSO/USLS/MUS/Cystoscopy 11/21/22    5. Anxiety and depression    6. Vitamin D deficiency              Clinically [facility]  Continue Letrozole through 9/2027.   Continue Vit D   BMD due- reports that it is scheduled.   Colonoscopy 2031  Message to Sarah for tattoo info  Encouraged to reach out to psychiatrist. Will discuss switching Zoloft.   Reassurance given.    RTC 6 months        Route Chart for Scheduling    Med Onc Chart Routing      Follow up with physician 6 months.   Follow up with [facility]    Infusion scheduling note    Injection scheduling note    Labs CBC and CMP   Scheduling:  Preferred lab:  Lab interval:     Imaging    Pharmacy appointment    Other referrals                        Patient is in agreement with the proposed treatment plan. All questions were answered to the patient's satisfaction. Pt knows to call clinic for any new or worsening symptoms and if anything is needed before the next clinic visit.    Jorge Ely, MSN, APRN, FNP-C  Nurse Practitioner to Dr. Nelia Frazier  Lead [facility]  [facility]  Hematology & Medical Oncology  [address] [place] [id number]  ph. [phone] ext [phone]  Fax. [phone]              MDM includes  :    - acute or chronic illness or injury  that poses a threat to life or bodily function  - Review of prior external notes from unique source  - Independent review and explanation of 2 results from unique tests  - Discussion of management and ordering 3 unique tests  - Extensive discussion of treatment and management  - Prescription drug management  - Drug therapy requiring intensive monitoring for toxicity

## 2025-02-13 NOTE — PROGRESS NOTES
"[facility]    Subjective:       Patient ID: Corinne Morrison Marcus is a 59 y.o. female.    Chief Complaint: Annual Exam      History was obtained from the patient and supplemented through chart review.  This patient is known to me.     HPI:    Pt is a 59 y.o. female r/ right breast invasive cancer, HTN, HLD, presents for annual    HTN  Normal yesteday with heme onc  Message sent for a few weeks    Hx Right Breast invasive cancer s/p resection  S/p bilat mastectomy with reconstruction  Stage 1B IDC of right Breast, ER+, DC+, Node +,  HER2-  H/O: Dr. Frazier  Taking letrozole for chemoprevention  Stable    Bereveament  Adjustment reaction with anxiety and depression  Panic attacks  Seeing psychiatrist Shiva Madera, "on call" doing well, doing therapy as well  Trazodone 50 mg qhs PRN, sometimes taking 100 mg  Taking zoloft 200 mg, stable.    Doing well though stressors lately with children's finances/relationship with former mother in law, bill from surg 2022    Thyroid nodule  Did not meet FNA threshold 2021, overdue for f/u  thyroid us normal Dec 2022, will repeat    Improved on statin  Stable, repeat    Constipation managed  Nightly sennakot per GI, 1 ducolax  Stable, colonoscopy utd    Two daughters 18/20 who see me as PCP as well  three sons   passed after metastatic melanoma, death April 25th 2021      Medical History  Past Medical History:   Diagnosis Date    Abnormal Pap smear of cervix     Abnormal Pap smear of vagina     Anxiety     Breast cancer 06/08/2022    Cancer     Constipation     Depression     Hemorrhoids     HLD (hyperlipidemia)     Lung nodule     Multinodular goiter 2010    s/p bx of 2 nodules in 2011 - benign    PONV (postoperative nausea and vomiting)        Review of Systems   Constitutional:  Negative for fever.   HENT:  Negative for trouble swallowing.    Respiratory:  Negative for shortness of breath.    Cardiovascular:  Negative for chest pain.   Gastrointestinal:  " "Positive for constipation. Negative for diarrhea, nausea and vomiting.   Musculoskeletal:  Negative for gait problem.   Neurological:  Negative for dizziness and seizures.   Psychiatric/Behavioral:  Positive for dysphoric mood (situational). Negative for hallucinations.          Surgical hx, family hx, social hx   Have been reviewed      Current Outpatient Medications:     atorvastatin (LIPITOR) 40 MG tablet, TAKE 1 TABLET DAILY, Disp: 90 tablet, Rfl: 3    cetirizine (ZYRTEC) 5 MG tablet, Take 5 mg by mouth once daily., Disp: , Rfl:     clindamycin-benzoyl peroxide gel, APPLY TOPICALLY TO ACNE AREA EVERY DAY AT NIGHT. MAY DISCOLOR SHEETS AND TOWELS, Disp: , Rfl:     fluticasone (FLONASE) 50 mcg/actuation nasal spray, 1 spray by Each Nare route once daily., Disp: , Rfl:     letrozole (FEMARA) 2.5 mg Tab, TAKE 1 TABLET DAILY, Disp: 90 tablet, Rfl: 3    minoxidiL (LONITEN) 2.5 MG tablet, Take 2.5 mg by mouth., Disp: , Rfl:     ondansetron (ZOFRAN-ODT) 4 MG TbDL, Take 2 tablets (8 mg total) by mouth 2 (two) times daily., Disp: 15 tablet, Rfl: 3    sertraline (ZOLOFT) 100 MG tablet, TAKE 2 TABLETS ONCE DAILY, Disp: 180 tablet, Rfl: 1    spironolactone (ALDACTONE) 100 MG tablet, Take 100 mg by mouth every morning., Disp: , Rfl:     traZODone (DESYREL) 50 MG tablet, Take 2 tablets (100 mg total) by mouth every evening., Disp: 180 tablet, Rfl: 3    Objective:        Body mass index is 23.73 kg/m².  Vitals:    02/14/25 1416   BP: (!) 138/92   Pulse: 95   SpO2: 98%   Weight: 62.7 kg (138 lb 3.7 oz)   Height: 5' 4" (1.626 m)   PainSc: 0-No pain           Physical Exam  Vitals and nursing note reviewed.   Constitutional:       General: She is not in acute distress.     Appearance: Normal appearance. She is not ill-appearing.   HENT:      Head: Normocephalic and atraumatic.   Eyes:      General: No scleral icterus.  Cardiovascular:      Rate and Rhythm: Normal rate and regular rhythm.      Heart sounds: Normal heart sounds. " "  Pulmonary:      Effort: Pulmonary effort is normal.   Musculoskeletal:         General: No deformity.      Cervical back: Normal range of motion.   Skin:     General: Skin is warm and dry.   Neurological:      Mental Status: She is alert and oriented to person, place, and time.   Psychiatric:         Behavior: Behavior normal.           Lab Results   Component Value Date    WBC 8.98 02/11/2025    HGB 13.8 02/11/2025    HCT 39.7 02/11/2025     02/11/2025    CHOL 225 (H) 02/09/2024    TRIG 78 02/09/2024    HDL 72 02/09/2024    ALT 19 02/11/2025    AST 25 02/11/2025     02/11/2025    K 5.1 02/11/2025    CL 99 02/11/2025    CREATININE 0.9 02/11/2025    BUN 18 02/11/2025    CO2 22 (L) 02/11/2025    TSH 0.516 11/29/2023    INR 1.0 08/11/2022    HGBA1C 5.5 10/29/2021       The 10-year ASCVD risk score (Gavino BERNAL, et al., 2019) is: 3.1%    Values used to calculate the score:      Age: 59 years      Sex: Female      Is Non- : No      Diabetic: No      Tobacco smoker: No      Systolic Blood Pressure: 138 mmHg      Is BP treated: No      HDL Cholesterol: 72 mg/dL      Total Cholesterol: 225 mg/dL    (Imaging have been independently reviewed)  Reviewed MRI breast 6/2022    Assessment:         1. Annual physical exam    2. Malignant neoplasm of right breast in female, estrogen receptor positive, unspecified site of breast    3. Hypertension, unspecified type    4. Hypercholesterolemia    5. Low serum vitamin B12    6. Breast cancer metastasized to axillary lymph node, right    7. Aromatase inhibitor use    8. Vitamin D deficiency    9. At risk for bleeding    10. Menopausal symptom    11. Current moderate episode of major depressive disorder without prior episode    12. S/p TLH/BSO/USLS/MUS/Cystoscopy 11/21/22    13. Screening for HIV (human immunodeficiency virus)    14. Thyroid nodule    15. Anxiety and depression    16. Multinodular thyroid                Plan:     Corinne "Patricia" was " seen today for annual exam.    Diagnoses and all orders for this visit:    Annual physical exam    Malignant neoplasm of right breast in female, estrogen receptor positive, unspecified site of breast  -     Vitamin D; Future  -     Comprehensive Metabolic Panel; Future    Hypertension, unspecified type    Hypercholesterolemia  -     Lipid Panel; Future    Low serum vitamin B12  -     Vitamin B12; Future    Breast cancer metastasized to axillary lymph node, right  -     Iron and TIBC; Future  -     Ferritin; Future  -     TSH; Future    Aromatase inhibitor use  -     Iron and TIBC; Future  -     Ferritin; Future  -     TSH; Future    Vitamin D deficiency  -     Vitamin D; Future    At risk for bleeding  -     CBC Without Differential; Future    Menopausal symptom  -     Comprehensive Metabolic Panel; Future    Current moderate episode of major depressive disorder without prior episode    S/p TLH/BSO/USLS/MUS/Cystoscopy 11/21/22    Screening for HIV (human immunodeficiency virus)  -     HIV 1/2 Ag/Ab (4th Gen); Future    Thyroid nodule  -     US Soft Tissue Head Neck; Future    Anxiety and depression    Multinodular thyroid          Health Maintenance  - Lipids:   - A1C:   - Colon Ca Screen: colonoscopy Dr. Solorzano at [facility] 2021, repeat 5 years (pt thought 10) due to adenoma on pathology  - Immunizations: cvid vaccinated    Women's health  - Pap: NILM Nov 2020 Dr. Marino   - Mammo: s/p breast cancer  - Dexa: N/A due to age  - Contraception: post-menopausal    No follow-ups on file. I      Visit today is associated with current or anticipated ongoing medical care related to this patient's single serious condition/complex condition of htn, bereavement. The patient will return to see me as these issues will be followed longitudinally.     All medications were reviewed including potential side effects and risks/benefits.  Pt was counseled to call back if anything worsens or if questions arise.    Gene Sterling,  MD  Family Medicine  [facility]  [address]  [address]  [place] [address]  Phone [phone]  Fax [phone]

## 2025-02-14 ENCOUNTER — PATIENT MESSAGE (OUTPATIENT)
Dept: INTERNAL MEDICINE | Facility: CLINIC | Age: 60
End: 2025-02-14

## 2025-02-14 ENCOUNTER — OFFICE VISIT (OUTPATIENT)
Dept: INTERNAL MEDICINE | Facility: CLINIC | Age: 60
End: 2025-02-14
Payer: COMMERCIAL

## 2025-02-14 ENCOUNTER — TELEPHONE (OUTPATIENT)
Dept: INTERNAL MEDICINE | Facility: CLINIC | Age: 60
End: 2025-02-14
Payer: COMMERCIAL

## 2025-02-14 VITALS
BODY MASS INDEX: 23.6 KG/M2 | SYSTOLIC BLOOD PRESSURE: 138 MMHG | HEIGHT: 64 IN | DIASTOLIC BLOOD PRESSURE: 92 MMHG | HEART RATE: 95 BPM | OXYGEN SATURATION: 98 % | WEIGHT: 138.25 LBS

## 2025-02-14 DIAGNOSIS — Z17.0 MALIGNANT NEOPLASM OF RIGHT BREAST IN FEMALE, ESTROGEN RECEPTOR POSITIVE, UNSPECIFIED SITE OF BREAST: ICD-10-CM

## 2025-02-14 DIAGNOSIS — F32.1 CURRENT MODERATE EPISODE OF MAJOR DEPRESSIVE DISORDER WITHOUT PRIOR EPISODE: ICD-10-CM

## 2025-02-14 DIAGNOSIS — F32.A ANXIETY AND DEPRESSION: ICD-10-CM

## 2025-02-14 DIAGNOSIS — C50.911 MALIGNANT NEOPLASM OF RIGHT BREAST IN FEMALE, ESTROGEN RECEPTOR POSITIVE, UNSPECIFIED SITE OF BREAST: ICD-10-CM

## 2025-02-14 DIAGNOSIS — F41.9 ANXIETY AND DEPRESSION: ICD-10-CM

## 2025-02-14 DIAGNOSIS — I10 HYPERTENSION, UNSPECIFIED TYPE: ICD-10-CM

## 2025-02-14 DIAGNOSIS — Z79.811 AROMATASE INHIBITOR USE: ICD-10-CM

## 2025-02-14 DIAGNOSIS — Z11.4 SCREENING FOR HIV (HUMAN IMMUNODEFICIENCY VIRUS): ICD-10-CM

## 2025-02-14 DIAGNOSIS — E04.1 THYROID NODULE: ICD-10-CM

## 2025-02-14 DIAGNOSIS — E04.2 MULTINODULAR THYROID: ICD-10-CM

## 2025-02-14 DIAGNOSIS — Z91.89 AT RISK FOR BLEEDING: ICD-10-CM

## 2025-02-14 DIAGNOSIS — Z00.00 ANNUAL PHYSICAL EXAM: Primary | ICD-10-CM

## 2025-02-14 DIAGNOSIS — E55.9 VITAMIN D DEFICIENCY: ICD-10-CM

## 2025-02-14 DIAGNOSIS — C50.911 BREAST CANCER METASTASIZED TO AXILLARY LYMPH NODE, RIGHT: ICD-10-CM

## 2025-02-14 DIAGNOSIS — E53.8 LOW SERUM VITAMIN B12: ICD-10-CM

## 2025-02-14 DIAGNOSIS — Z90.710 S/P LAPAROSCOPIC HYSTERECTOMY: ICD-10-CM

## 2025-02-14 DIAGNOSIS — E78.00 HYPERCHOLESTEROLEMIA: ICD-10-CM

## 2025-02-14 DIAGNOSIS — N95.1 MENOPAUSAL SYMPTOM: ICD-10-CM

## 2025-02-14 DIAGNOSIS — C77.3 BREAST CANCER METASTASIZED TO AXILLARY LYMPH NODE, RIGHT: ICD-10-CM

## 2025-02-14 PROCEDURE — 99999 PR PBB SHADOW E&M-EST. PATIENT-LVL IV: CPT | Mod: PBBFAC,,, | Performed by: STUDENT IN AN ORGANIZED HEALTH CARE EDUCATION/TRAINING PROGRAM

## 2025-02-24 ENCOUNTER — RESULTS FOLLOW-UP (OUTPATIENT)
Dept: INTERNAL MEDICINE | Facility: CLINIC | Age: 60
End: 2025-02-24

## 2025-02-24 ENCOUNTER — HOSPITAL ENCOUNTER (OUTPATIENT)
Dept: RADIOLOGY | Facility: OTHER | Age: 60
Discharge: HOME | End: 2025-02-24
Attending: STUDENT IN AN ORGANIZED HEALTH CARE EDUCATION/TRAINING PROGRAM
Payer: COMMERCIAL

## 2025-02-24 DIAGNOSIS — E04.1 THYROID NODULE: ICD-10-CM

## 2025-02-24 PROCEDURE — 76536 US EXAM OF HEAD AND NECK: CPT | Mod: TC

## 2025-02-24 PROCEDURE — 76536 US EXAM OF HEAD AND NECK: CPT | Mod: 26,,, | Performed by: RADIOLOGY

## 2025-02-25 DIAGNOSIS — E78.00 HYPERCHOLESTEROLEMIA: ICD-10-CM

## 2025-02-26 RX ORDER — ATORVASTATIN CALCIUM 40 MG/1
40 TABLET, FILM COATED ORAL NIGHTLY
Qty: 90 TABLET | Refills: 0 | Status: SHIPPED | OUTPATIENT
Start: 2025-02-26

## 2025-02-26 NOTE — TELEPHONE ENCOUNTER
Left voice mail for patient to call the office back to schedule lab appointment, also sent portal message.

## 2025-02-26 NOTE — TELEPHONE ENCOUNTER
Care Due:                  Date            Visit Type   Department     Provider  --------------------------------------------------------------------------------                                EP -                              PRIMARY      [facility] INTERNAL  Last Visit: 02-      CARE ([facility])   MEDICINE       Gene Sterling  Next Visit: None Scheduled  None         None Found                                                            Last  Test          Frequency    Reason                     Performed    Due Date  --------------------------------------------------------------------------------    Lipid Panel.  12 months..  atorvastatin.............  02- 02-    [facility] Embedded Care Due Messages. Reference number: [id number].   2/25/2025 9:43:35 PM CST

## 2025-02-26 NOTE — TELEPHONE ENCOUNTER
Refill Routing Note   Medication(s) are not appropriate for processing by [facility] for the following reason(s):        Required labs outdated    [facility] action(s):  Defer     Requires labs : Yes             Appointments  past 12m or future 3m with PCP    Date Provider   Last Visit   2/14/2025 Gene Sterling MD   Next Visit   Visit date not found Gene Sterling MD   ED visits in past 90 days: 0        Note composed:9:52 PM 02/25/2025

## 2025-03-19 ENCOUNTER — PATIENT MESSAGE (OUTPATIENT)
Dept: INTERNAL MEDICINE | Facility: CLINIC | Age: 60
End: 2025-03-19
Payer: COMMERCIAL

## 2025-03-31 ENCOUNTER — PATIENT MESSAGE (OUTPATIENT)
Dept: ADMINISTRATIVE | Facility: HOSPITAL | Age: 60
End: 2025-03-31
Payer: COMMERCIAL

## 2025-04-07 DIAGNOSIS — F32.A DEPRESSION, UNSPECIFIED DEPRESSION TYPE: ICD-10-CM

## 2025-04-07 RX ORDER — SERTRALINE HYDROCHLORIDE 100 MG/1
200 TABLET, FILM COATED ORAL
Qty: 180 TABLET | Refills: 3 | Status: SHIPPED | OUTPATIENT
Start: 2025-04-07

## 2025-04-07 NOTE — TELEPHONE ENCOUNTER
No care due was identified.  [facility] Embedded Care Due Messages. Reference number: [id number].   4/07/2025 12:11:15 AM CDT

## 2025-04-07 NOTE — TELEPHONE ENCOUNTER
Refill Decision Note   Corinne Marcus  is requesting a refill authorization.  Brief Assessment and Rationale for Refill:  Approve     Medication Therapy Plan:         Comments:     Note composed:6:08 AM 04/07/2025

## 2025-04-08 ENCOUNTER — PATIENT MESSAGE (OUTPATIENT)
Dept: INTERNAL MEDICINE | Facility: CLINIC | Age: 60
End: 2025-04-08
Payer: COMMERCIAL

## 2025-04-08 VITALS — SYSTOLIC BLOOD PRESSURE: 138 MMHG | DIASTOLIC BLOOD PRESSURE: 94 MMHG

## 2025-05-13 ENCOUNTER — PATIENT MESSAGE (OUTPATIENT)
Dept: OBSTETRICS AND GYNECOLOGY | Facility: CLINIC | Age: 60
End: 2025-05-13
Payer: COMMERCIAL

## 2025-05-13 ENCOUNTER — LAB VISIT (OUTPATIENT)
Dept: LAB | Facility: OTHER | Age: 60
End: 2025-05-13
Attending: STUDENT IN AN ORGANIZED HEALTH CARE EDUCATION/TRAINING PROGRAM
Payer: COMMERCIAL

## 2025-05-13 ENCOUNTER — PATIENT MESSAGE (OUTPATIENT)
Dept: INTERNAL MEDICINE | Facility: CLINIC | Age: 60
End: 2025-05-13
Payer: COMMERCIAL

## 2025-05-13 DIAGNOSIS — C50.911 MALIGNANT NEOPLASM OF RIGHT BREAST IN FEMALE, ESTROGEN RECEPTOR POSITIVE, UNSPECIFIED SITE OF BREAST: ICD-10-CM

## 2025-05-13 DIAGNOSIS — Z79.811 AROMATASE INHIBITOR USE: ICD-10-CM

## 2025-05-13 DIAGNOSIS — Z91.89 AT RISK FOR BLEEDING: ICD-10-CM

## 2025-05-13 DIAGNOSIS — C50.911 BREAST CANCER METASTASIZED TO AXILLARY LYMPH NODE, RIGHT: ICD-10-CM

## 2025-05-13 DIAGNOSIS — Z17.0 MALIGNANT NEOPLASM OF RIGHT BREAST IN FEMALE, ESTROGEN RECEPTOR POSITIVE, UNSPECIFIED SITE OF BREAST: ICD-10-CM

## 2025-05-13 DIAGNOSIS — E78.00 HYPERCHOLESTEROLEMIA: ICD-10-CM

## 2025-05-13 DIAGNOSIS — E53.8 LOW SERUM VITAMIN B12: ICD-10-CM

## 2025-05-13 DIAGNOSIS — C77.3 BREAST CANCER METASTASIZED TO AXILLARY LYMPH NODE, RIGHT: ICD-10-CM

## 2025-05-13 DIAGNOSIS — N95.1 MENOPAUSAL SYMPTOM: ICD-10-CM

## 2025-05-13 DIAGNOSIS — Z11.4 SCREENING FOR HIV (HUMAN IMMUNODEFICIENCY VIRUS): ICD-10-CM

## 2025-05-13 DIAGNOSIS — E55.9 VITAMIN D DEFICIENCY: ICD-10-CM

## 2025-05-13 LAB
25(OH)D3+25(OH)D2 SERPL-MCNC: 72 NG/ML (ref 30–96)
ABSOLUTE NEUTROPHIL COUNT (OHS): 3.56 K/UL (ref 1.8–7.7)
ALBUMIN SERPL BCP-MCNC: 4.5 G/DL (ref 3.5–5.2)
ALP SERPL-CCNC: 84 UNIT/L (ref 40–150)
ALT SERPL W/O P-5'-P-CCNC: 21 UNIT/L (ref 10–44)
ANION GAP (OHS): 11 MMOL/L (ref 8–16)
AST SERPL-CCNC: 31 UNIT/L (ref 11–45)
BILIRUB SERPL-MCNC: 0.5 MG/DL (ref 0.1–1)
BUN SERPL-MCNC: 10 MG/DL (ref 6–20)
CALCIUM SERPL-MCNC: 10.7 MG/DL (ref 8.7–10.5)
CHLORIDE SERPL-SCNC: 101 MMOL/L (ref 95–110)
CHOLEST SERPL-MCNC: 240 MG/DL (ref 120–199)
CHOLEST/HDLC SERPL: 2.9 {RATIO} (ref 2–5)
CO2 SERPL-SCNC: 28 MMOL/L (ref 23–29)
CREAT SERPL-MCNC: 0.8 MG/DL (ref 0.5–1.4)
ERYTHROCYTE [DISTWIDTH] IN BLOOD BY AUTOMATED COUNT: 12.9 % (ref 11.5–14.5)
ERYTHROCYTE [DISTWIDTH] IN BLOOD BY AUTOMATED COUNT: 12.9 % (ref 11.5–14.5)
FERRITIN SERPL-MCNC: 88 NG/ML (ref 20–300)
GFR SERPLBLD CREATININE-BSD FMLA CKD-EPI: >60 ML/MIN/1.73/M2
GLUCOSE SERPL-MCNC: 88 MG/DL (ref 70–110)
HCT VFR BLD AUTO: 38.3 % (ref 37–48.5)
HCT VFR BLD AUTO: 38.3 % (ref 37–48.5)
HDLC SERPL-MCNC: 83 MG/DL (ref 40–75)
HDLC SERPL: 34.6 % (ref 20–50)
HGB BLD-MCNC: 12.8 GM/DL (ref 12–16)
HGB BLD-MCNC: 12.8 GM/DL (ref 12–16)
HIV 1+2 AB+HIV1 P24 AG SERPL QL IA: NEGATIVE
IMM GRANULOCYTES # BLD AUTO: 0.02 K/UL (ref 0–0.04)
IRON SATN MFR SERPL: 25 % (ref 20–50)
IRON SERPL-MCNC: 106 UG/DL (ref 30–160)
LDLC SERPL CALC-MCNC: 122.8 MG/DL (ref 63–159)
MCH RBC QN AUTO: 30.1 PG (ref 27–31)
MCH RBC QN AUTO: 30.1 PG (ref 27–31)
MCHC RBC AUTO-ENTMCNC: 33.4 G/DL (ref 32–36)
MCHC RBC AUTO-ENTMCNC: 33.4 G/DL (ref 32–36)
MCV RBC AUTO: 90 FL (ref 82–98)
MCV RBC AUTO: 90 FL (ref 82–98)
NONHDLC SERPL-MCNC: 157 MG/DL
PLATELET # BLD AUTO: 344 K/UL (ref 150–450)
PLATELET # BLD AUTO: 344 K/UL (ref 150–450)
PMV BLD AUTO: 8.9 FL (ref 9.2–12.9)
PMV BLD AUTO: 8.9 FL (ref 9.2–12.9)
POTASSIUM SERPL-SCNC: 4.2 MMOL/L (ref 3.5–5.1)
PROT SERPL-MCNC: 8.3 GM/DL (ref 6–8.4)
RBC # BLD AUTO: 4.25 M/UL (ref 4–5.4)
RBC # BLD AUTO: 4.25 M/UL (ref 4–5.4)
SODIUM SERPL-SCNC: 140 MMOL/L (ref 136–145)
TIBC SERPL-MCNC: 422 UG/DL (ref 250–450)
TRANSFERRIN SERPL-MCNC: 285 MG/DL (ref 200–375)
TRIGL SERPL-MCNC: 171 MG/DL (ref 30–150)
TSH SERPL-ACNC: 0.51 UIU/ML (ref 0.4–4)
VIT B12 SERPL-MCNC: 434 PG/ML (ref 210–950)
WBC # BLD AUTO: 5.41 K/UL (ref 3.9–12.7)
WBC # BLD AUTO: 5.41 K/UL (ref 3.9–12.7)

## 2025-05-13 PROCEDURE — 84466 ASSAY OF TRANSFERRIN: CPT

## 2025-05-13 PROCEDURE — 80053 COMPREHEN METABOLIC PANEL: CPT

## 2025-05-13 PROCEDURE — 84443 ASSAY THYROID STIM HORMONE: CPT

## 2025-05-13 PROCEDURE — 87389 HIV-1 AG W/HIV-1&-2 AB AG IA: CPT

## 2025-05-13 PROCEDURE — 82306 VITAMIN D 25 HYDROXY: CPT

## 2025-05-13 PROCEDURE — 36415 COLL VENOUS BLD VENIPUNCTURE: CPT

## 2025-05-13 PROCEDURE — 85027 COMPLETE CBC AUTOMATED: CPT

## 2025-05-13 PROCEDURE — 82728 ASSAY OF FERRITIN: CPT

## 2025-05-13 PROCEDURE — 80061 LIPID PANEL: CPT

## 2025-05-13 PROCEDURE — 82607 VITAMIN B-12: CPT

## 2025-05-13 PROCEDURE — 85027 COMPLETE CBC AUTOMATED: CPT | Mod: 59

## 2025-05-14 ENCOUNTER — RESULTS FOLLOW-UP (OUTPATIENT)
Dept: INTERNAL MEDICINE | Facility: CLINIC | Age: 60
End: 2025-05-14

## 2025-05-14 DIAGNOSIS — E83.52 HYPERCALCEMIA: Primary | ICD-10-CM

## 2025-05-15 ENCOUNTER — TELEPHONE (OUTPATIENT)
Dept: PAIN MEDICINE | Facility: CLINIC | Age: 60
End: 2025-05-15
Payer: COMMERCIAL

## 2025-05-15 NOTE — TELEPHONE ENCOUNTER
Lvm to pt in regards to scheduling labs and to go over pcp notes.     ----- Message from Gene Sterling MD sent at 5/14/2025  8:53 PM CDT -----  2 weeks cmp and ionized calcium please      Good evening,    Your blood counts are normal.  Kidney and liver function look good!  LDL Cholesterol is within an acceptable range, though exercise and health eating is always encouraged if you're not doing so already.  Triglycerides are elevated, sometimes due to alcohol and snack food.  Calcium is a tad high, but could be because of dehydration or tums or maybe taking too much calcium?  We can just repeat in a few months.  Thyroid function is normal.  Average glucose is normal, meaning no diabetes, nor prediabetes.  B12 has dropped again, although above the threshold of 400 I'd recommend.  You can continue what you're doing if you are taking a small supplement or a multi-vitamin.  Vitamin D is much better, although you can back off a little now.  If you are taking 5,000 units, recommend taking just three times a week. Or go down to 2,000.  If you are taking 2,000, then I   recommend 1,000. (Or the such)  Iron levels look good.    Please let us know if you'd like an appointment to discuss these results further.    Sincerely,  OTTONIEL Sterling MD  ----- Message -----  From: Lab, Background User  Sent: 5/13/2025   1:06 PM CDT  To: Gene Sterling MD

## 2025-05-15 NOTE — PROGRESS NOTES
2 weeks cmp and ionized calcium please      Good evening,    Your blood counts are normal.  Kidney and liver function look good!  LDL Cholesterol is within an acceptable range, though exercise and health eating is always encouraged if you're not doing so already.  Triglycerides are elevated, sometimes due to alcohol and snack food.  Calcium is a tad high, but could be because of dehydration or tums or maybe taking too much calcium?  We can just repeat in a few months.  Thyroid function is normal.  Average glucose is normal, meaning no diabetes, nor prediabetes.  B12 has dropped again, although above the threshold of 400 I'd recommend.  You can continue what you're doing if you are taking a small supplement or a multi-vitamin.  Vitamin D is much better, although you can back off a little now.  If you are taking 5,000 units, recommend taking just three times a week. Or go down to 2,000.  If you are taking 2,000, then I recommend 1,000. (Or the such)  Iron levels look good.    Please let us know if you'd like an appointment to discuss these results further.    Sincerely,  OTTONIEL Sterling MD

## 2025-05-21 ENCOUNTER — TELEPHONE (OUTPATIENT)
Dept: PHARMACY | Facility: CLINIC | Age: 60
End: 2025-05-21
Payer: COMMERCIAL

## 2025-05-21 NOTE — TELEPHONE ENCOUNTER
[facility]/Population Health Chart Review & Patient Outreach Details For Medication Adherence Project    Reason for Outreach Encounter: 3rd Party payor non-compliance report ([employer], [employer], [employer], etc)  2.  Patient Outreach Method: Reviewed patient chart   3.   Medication in question:    Hyperlipidemia Medications              atorvastatin (LIPITOR) 40 MG tablet Take 1 tablet (40 mg total) by mouth every evening. Needs labs please                  LF 90 ds 2/26/25    4.  Reviewed and or Updates Made To: Patient Chart  5. Outreach Outcomes and/or actions taken: Patient filled medication and is on track to be adherent  Additional Notes:

## 2025-06-05 ENCOUNTER — TELEPHONE (OUTPATIENT)
Dept: PHARMACY | Facility: CLINIC | Age: 60
End: 2025-06-05
Payer: COMMERCIAL

## 2025-06-08 DIAGNOSIS — E78.00 HYPERCHOLESTEROLEMIA: ICD-10-CM

## 2025-06-08 RX ORDER — ATORVASTATIN CALCIUM 40 MG/1
TABLET, FILM COATED ORAL
Qty: 90 TABLET | Refills: 2 | Status: SHIPPED | OUTPATIENT
Start: 2025-06-08

## 2025-06-08 NOTE — TELEPHONE ENCOUNTER
No care due was identified.  [facility] Embedded Care Due Messages. Reference number: [id number].   6/08/2025 10:52:32 AM CDT

## 2025-06-09 ENCOUNTER — PATIENT MESSAGE (OUTPATIENT)
Dept: ADMINISTRATIVE | Facility: HOSPITAL | Age: 60
End: 2025-06-09
Payer: COMMERCIAL

## 2025-06-09 NOTE — TELEPHONE ENCOUNTER
Refill Decision Note   Corinne Marcus  is requesting a refill authorization.  Brief Assessment and Rationale for Refill:  Approve     Medication Therapy Plan:         Comments:     Note composed:9:35 PM 06/08/2025             Appointments     Last Visit   2/14/2025 Gene Sterling MD   Next Visit   Visit date not found Gene Sterling MD

## 2025-06-12 ENCOUNTER — OFFICE VISIT (OUTPATIENT)
Dept: OBSTETRICS AND GYNECOLOGY | Facility: CLINIC | Age: 60
End: 2025-06-12
Attending: OBSTETRICS & GYNECOLOGY
Payer: COMMERCIAL

## 2025-06-12 VITALS
WEIGHT: 136 LBS | BODY MASS INDEX: 24.1 KG/M2 | HEIGHT: 63 IN | DIASTOLIC BLOOD PRESSURE: 46 MMHG | HEART RATE: 94 BPM | SYSTOLIC BLOOD PRESSURE: 196 MMHG

## 2025-06-12 DIAGNOSIS — C50.911 BREAST CANCER METASTASIZED TO AXILLARY LYMPH NODE, RIGHT: ICD-10-CM

## 2025-06-12 DIAGNOSIS — Z01.419 ENCOUNTER FOR GYNECOLOGICAL EXAMINATION WITHOUT ABNORMAL FINDING: Primary | ICD-10-CM

## 2025-06-12 DIAGNOSIS — Z79.811 USE OF AROMATASE INHIBITORS: ICD-10-CM

## 2025-06-12 DIAGNOSIS — C77.3 BREAST CANCER METASTASIZED TO AXILLARY LYMPH NODE, RIGHT: ICD-10-CM

## 2025-06-12 PROCEDURE — 99999 PR PBB SHADOW E&M-EST. PATIENT-LVL III: CPT | Mod: PBBFAC,,, | Performed by: OBSTETRICS & GYNECOLOGY

## 2025-06-12 PROCEDURE — 99396 PREV VISIT EST AGE 40-64: CPT | Mod: S$GLB,,, | Performed by: OBSTETRICS & GYNECOLOGY

## 2025-06-12 PROCEDURE — 3008F BODY MASS INDEX DOCD: CPT | Mod: CPTII,S$GLB,, | Performed by: OBSTETRICS & GYNECOLOGY

## 2025-06-12 PROCEDURE — 3078F DIAST BP <80 MM HG: CPT | Mod: CPTII,S$GLB,, | Performed by: OBSTETRICS & GYNECOLOGY

## 2025-06-12 PROCEDURE — 3077F SYST BP >= 140 MM HG: CPT | Mod: CPTII,S$GLB,, | Performed by: OBSTETRICS & GYNECOLOGY

## 2025-06-12 NOTE — PROGRESS NOTES
SUBJECTIVE:   59 y.o. female   for annual routine  checkup. Patient's last menstrual period was 2019..  She has no unusual complaints.      She has history of breast cancer  Recent fracture  Past Medical History:   Diagnosis Date    Abnormal Pap smear of cervix     Abnormal Pap smear of vagina     Anxiety     Breast cancer 2022    Cancer     Constipation     Depression     Hemorrhoids     HLD (hyperlipidemia)     Lung nodule     Multinodular goiter     s/p bx of 2 nodules in  - benign    PONV (postoperative nausea and vomiting)      Past Surgical History:   Procedure Laterality Date    APPENDECTOMY      BILATERAL MASTECTOMY Bilateral 2022    BILATERAL SALPINGO-OOPHORECTOMY (BSO) N/A 2022    Procedure: SALPINGO-OOPHORECTOMY, BILATERAL;  Surgeon: Sheree Holden MD;  Location: [facility] OR;  Service: OB/GYN;  Laterality: N/A;    BREAST RECONSTRUCTION Bilateral 2022    COLONOSCOPY      4 as of  [place] at [place], next due in     COLONOSCOPY N/A 10/27/2021    Procedure: COLONOSCOPY;  Surgeon: Garland Whalen MD;  Location: [facility] ([address] [place] [address]);  Service: Colon and Rectal;  Laterality: N/A;  Covid test 10/24 [place], instructions sent to [facility]   10/25 arrival time confirmed with pt-rb    COLPOPEXY N/A 2022    Procedure: UTEROSACRAL LIGAMENT SUSPENSION;  Surgeon: Sheree Holden MD;  Location: [facility] OR;  Service: OB/GYN;  Laterality: N/A;    COLPOSCOPY  2014    EXCISIONAL HEMORRHOIDECTOMY      HERNIA REPAIR  2024    INSERTION OF MIDURETHRAL SLING N/A 2022    Procedure: SLING, MIDURETHRAL;  Surgeon: Sheree Holden MD;  Location: [facility] OR;  Service: OB/GYN;  Laterality: N/A;    LAPAROSCOPIC TOTAL HYSTERECTOMY N/A 2022    Procedure: HYSTERECTOMY, TOTAL, LAPAROSCOPIC;  Surgeon: Sheree Holden MD;  Location: [facility] OR;  Service: OB/GYN;  Laterality: N/A;    TOTAL REDUCTION MAMMOPLASTY      TOTAL REDUCTION MAMMOPLASTY Bilateral 2022     Procedure: MAMMOPLASTY, REDUCTION;  Surgeon: Dex Gutierrez MD;  Location: [facility];  Service: Plastics;  Laterality: Bilateral;     Social History[1]  Family History   Problem Relation Name Age of Onset    Breast cancer Maternal Grandmother Coco Macdonald 68    Cancer Maternal Grandmother Coco Macdonald     Lung cancer Father Hitesh Hollis         lung, + tobacco    Cancer Father Hitesh Hollis     Hypertension Mother Violet Potts     Heart attack Mother Violet Potts     No Known Problems Brother      No Known Problems Daughter      No Known Problems Daughter      No Known Problems Son      No Known Problems Son      Ovarian cancer Neg Hx      Colon cancer Neg Hx      Uterine cancer Neg Hx      Cervical cancer Neg Hx       OB History    Para Term  AB Living   5 5 0      SAB IAB Ectopic Multiple Live Births             # Outcome Date GA Lbr Charanjit/2nd Weight Sex Type Anes PTL Lv   5 Para      Vag-Spont      4 Para      Vag-Spont      3 Para      Vag-Spont      2 Para      Vag-Spont      1 Para      Vag-Spont              Current Medications[2]  Allergies: Patient has no known allergies.     The 10-year ASCVD risk score (Gavino BERNAL, et al., 2019) is: 8%    Values used to calculate the score:      Age: 59 years      Sex: Female      Is Non- : No      Diabetic: No      Tobacco smoker: No      Systolic Blood Pressure: 196 mmHg      Is BP treated: Yes      HDL Cholesterol: 83 mg/dL      Total Cholesterol: 240 mg/dL      ROS:  Constitutional: no weight loss, weight gain, fever, fatigue  Eyes:  No vision changes, glasses/contacts  ENT/Mouth: No ulcers, sinus problems, ears ringing, headache  Cardiovascular: No inability to lie flat, chest pain, exercise intolerance, swelling, heart palpitations  Respiratory: No wheezing, coughing blood, shortness of breath, or cough  Gastrointestinal: No diarrhea, bloody stool, nausea/vomiting, constipation, gas, hemorrhoids  Genitourinary: No blood in  urine, painful urination, urgency of urination, frequency of urination, incomplete emptying, incontinence, abnormal bleeding, painful periods, heavy periods, vaginal discharge, vaginal odor, painful intercourse, sexual problems, bleeding after intercourse.  Musculoskeletal: No muscle weakness  Skin/Breast: No painful breasts, nipple discharge, masses, rash, ulcers  Neurological: No passing out, seizures, numbness, headache  Endocrine: No diabetes, hypothyroid, hyperthyroid, hot flashes, hair loss, abnormal hair growth, acne  Psychiatric: No depression, crying  Hematologic: No bruises, bleeding, swollen lymph nodes, anemia.      Physical Exam:   Constitutional: She is oriented to person, place, and time. She appears well-developed and well-nourished.      Neck: No tracheal deviation present. No thyromegaly present.    Cardiovascular:       Exam reveals no edema.        Pulmonary/Chest: Effort normal. She exhibits no mass, no tenderness, no deformity and no retraction. Right breast exhibits no inverted nipple, no mass, no nipple discharge, no skin change, no tenderness, presence, no bleeding and no swelling. Left breast exhibits no inverted nipple, no mass, no nipple discharge, no skin change, no tenderness, presence, no bleeding and no swelling. Breasts are symmetrical.        Abdominal: Soft. She exhibits no distension and no mass. There is no abdominal tenderness. There is no rebound and no guarding. No hernia. Hernia confirmed negative in the left inguinal area.     Genitourinary: Rectum:      No external hemorrhoid.   There is no rash, tenderness or lesion on the right labia. There is no rash, tenderness or lesion on the left labia. No no adexnal prolapse. Right adnexum displays no mass, no tenderness and no fullness. Left adnexum displays no mass, no tenderness and no fullness. No vaginal discharge, tenderness, bleeding, rectocele, cystocele or prolapse of vaginal walls in the vagina. Cervix is absent.Uterus is  absent.           Musculoskeletal: Normal range of motion and moves all extremeties. No edema.       Neurological: She is alert and oriented to person, place, and time.    Skin: No rash noted. No erythema. No pallor.    Psychiatric: She has a normal mood and affect. Her behavior is normal. Judgment and thought content normal.     S/p bilateral mastectomy with reconstruction    ASSESSMENT:   well woman  1. Encounter for gynecological examination without abnormal finding        2. Breast cancer metastasized to axillary lymph node, right        3. Use of aromatase inhibitors            PLAN:   Schedule Dexa  return annually or prn         [1]   Social History  Socioeconomic History    Marital status:     Number of children: 5   Occupational History    Occupation:      Comment:  - not practicing   Tobacco Use    Smoking status: Never    Smokeless tobacco: Never   Substance and Sexual Activity    Alcohol use: Yes     Alcohol/week: 4.0 standard drinks of alcohol     Types: 4 Glasses of wine per week     Comment: socially    Drug use: No    Sexual activity: Not Currently     Partners: Male     Birth control/protection: None   Social History Narrative    Originally from [place]    Living in [place] with family        Getting reg exercise     Social Drivers of Health     Financial Resource Strain: Low Risk  (1/14/2025)    Overall Financial Resource Strain (CARDIA)     Difficulty of Paying Living Expenses: Not very hard   Food Insecurity: No Food Insecurity (1/14/2025)    Hunger Vital Sign     Worried About Running Out of Food in the Last Year: Never true     Ran Out of Food in the Last Year: Never true   Transportation Needs: No Transportation Needs (12/1/2023)    PRAPARE - Transportation     Lack of Transportation (Medical): No     Lack of Transportation (Non-Medical): No   Physical Activity: Sufficiently Active (1/14/2025)    Exercise Vital Sign     Days of Exercise per Week: 4 days     Minutes of  Exercise per Session: 90 min   Stress: Stress Concern Present (1/14/2025)    [employer] - Occupational Stress Questionnaire     Feeling of Stress : To some extent   Housing Stability: Low Risk  (12/1/2023)    Housing Stability Vital Sign     Unable to Pay for Housing in the Last Year: No     Number of Places Lived in the Last Year: 1     Unstable Housing in the Last Year: No   [2]   Current Outpatient Medications   Medication Sig Dispense Refill    atorvastatin (LIPITOR) 40 MG tablet TAKE 1 TABLET EVERY EVENING (NEEDS LABS PLEASE) 90 tablet 2    cetirizine (ZYRTEC) 5 MG tablet Take 5 mg by mouth once daily.      clindamycin-benzoyl peroxide gel APPLY TOPICALLY TO ACNE AREA EVERY DAY AT NIGHT. MAY DISCOLOR SHEETS AND TOWELS      fluticasone (FLONASE) 50 mcg/actuation nasal spray 1 spray by Each Nare route once daily.      letrozole (FEMARA) 2.5 mg Tab TAKE 1 TABLET DAILY 90 tablet 3    minoxidiL (LONITEN) 2.5 MG tablet Take 2.5 mg by mouth.      ondansetron (ZOFRAN-ODT) 4 MG TbDL Take 2 tablets (8 mg total) by mouth 2 (two) times daily. 15 tablet 3    sertraline (ZOLOFT) 100 MG tablet TAKE 2 TABLETS ONCE DAILY 180 tablet 3    spironolactone (ALDACTONE) 100 MG tablet Take 100 mg by mouth every morning.      traZODone (DESYREL) 50 MG tablet Take 2 tablets (100 mg total) by mouth every evening. 180 tablet 3     No current facility-administered medications for this visit.

## 2025-07-07 ENCOUNTER — TELEPHONE (OUTPATIENT)
Facility: CLINIC | Age: 60
End: 2025-07-07
Payer: COMMERCIAL

## 2025-07-07 NOTE — TELEPHONE ENCOUNTER
Called patient to inform her that she haqs been scheduled incorrectly with Dr. Pleitez and her appt was cancelled, but she will be rescheduled correctly with gen neuro

## 2025-07-08 ENCOUNTER — TELEPHONE (OUTPATIENT)
Dept: NEUROLOGY | Facility: CLINIC | Age: 60
End: 2025-07-08
Payer: COMMERCIAL

## 2025-07-08 NOTE — TELEPHONE ENCOUNTER
----- Message from Med Assistant Zacarias sent at 7/7/2025  8:16 AM CDT -----  Hi, can you schedule this patient into [facility]?

## 2025-07-08 NOTE — TELEPHONE ENCOUNTER
"Pt declined appt at this time. States she had been sitting in one position for a prolonged period and was unable to walk on her foot when she got up. Initially, she worried she was having a stroke, felt like "brain couldn't control foot."  Ordered ball on [employer] for home PT and is doing HEP with almost complete improvement.   Advised to discuss with PCP. Direct Number provided for future concerns.   "

## 2025-07-09 ENCOUNTER — PATIENT MESSAGE (OUTPATIENT)
Dept: HEMATOLOGY/ONCOLOGY | Facility: CLINIC | Age: 60
End: 2025-07-09
Payer: COMMERCIAL

## 2025-07-09 DIAGNOSIS — Z79.811 AROMATASE INHIBITOR USE: ICD-10-CM

## 2025-07-09 DIAGNOSIS — C77.3 BREAST CANCER METASTASIZED TO AXILLARY LYMPH NODE, RIGHT: Primary | ICD-10-CM

## 2025-07-09 DIAGNOSIS — C50.911 MALIGNANT NEOPLASM OF RIGHT BREAST IN FEMALE, ESTROGEN RECEPTOR POSITIVE, UNSPECIFIED SITE OF BREAST: ICD-10-CM

## 2025-07-09 DIAGNOSIS — Z17.0 MALIGNANT NEOPLASM OF RIGHT BREAST IN FEMALE, ESTROGEN RECEPTOR POSITIVE, UNSPECIFIED SITE OF BREAST: ICD-10-CM

## 2025-07-09 DIAGNOSIS — C50.911 BREAST CANCER METASTASIZED TO AXILLARY LYMPH NODE, RIGHT: Primary | ICD-10-CM

## 2025-07-11 ENCOUNTER — TELEPHONE (OUTPATIENT)
Dept: NEUROLOGY | Facility: CLINIC | Age: 60
End: 2025-07-11
Payer: COMMERCIAL

## 2025-07-11 NOTE — TELEPHONE ENCOUNTER
Incoming call from pt recvd via warm transfer. Spoke with the patient who is still having occasional trouble walking, feels like there's a disconnect between brain and foot-  Just attempted to walk but can't make foot go. Feels like foot is dragging. Offered next available appointment in resident clinic Wednesday at 8 am; denies warmth or swelling; in fact, foot feels cold. Pt accepted next available, verbalized understanding, and repeated back date/time/location of scheduled appointment.

## 2025-07-16 ENCOUNTER — TELEPHONE (OUTPATIENT)
Dept: INTERNAL MEDICINE | Facility: CLINIC | Age: 60
End: 2025-07-16
Payer: COMMERCIAL

## 2025-07-16 ENCOUNTER — OFFICE VISIT (OUTPATIENT)
Dept: NEUROLOGY | Facility: CLINIC | Age: 60
End: 2025-07-16
Payer: COMMERCIAL

## 2025-07-16 VITALS
DIASTOLIC BLOOD PRESSURE: 89 MMHG | SYSTOLIC BLOOD PRESSURE: 129 MMHG | BODY MASS INDEX: 23.43 KG/M2 | WEIGHT: 132.25 LBS | HEIGHT: 63 IN | HEART RATE: 60 BPM

## 2025-07-16 DIAGNOSIS — I63.89 OTHER CEREBRAL INFARCTION: Primary | ICD-10-CM

## 2025-07-16 PROCEDURE — 99999 PR PBB SHADOW E&M-EST. PATIENT-LVL III: CPT | Mod: PBBFAC,,,

## 2025-07-16 NOTE — TELEPHONE ENCOUNTER
Copied from CRM #[id number]. Topic: General Inquiry - Patient Advice  >> Jul 16, 2025  3:59 PM Michael wrote:  Type: Patient Call Back    Who called:Tracie ([employer])     What is the request in detail: Rep calling in to requesting that the orders for the MRI be faxed over to [facility] [phone].The MRI is for the brain without contrast.  Please Advise     Can the clinic reply by [facility]? No     Would the patient rather a call back or a response via [facility]? Call     Best call back number: [phone]      Additional Information:  REF# [id number]

## 2025-07-16 NOTE — PROGRESS NOTES
[facility] [place]    Date: 7/16/25  Patient Name: Corinne Morrison Marcus   MRN: [id number]   PCP: Gene Sterling  Referring Provider: Gene Sterling MD    Assessment:   Corinne Morrison Marcus is a 59 y.o. female presenting for evaluation of R foot drop. Symptoms are improving but she continues to have R foot weakness. Possible that her symptoms are secondary to peripheral nerve compression of the peroneal nerve, but would not expect this to cause her to have absence of movements in all directions of the R foot which she initially had. Given this, will evaluate with MRI Brain to r/o stroke as etiology. If MRI Brain is negative, will proceed with EMG/NCS for further evaluation.    Plan:   -MRI Brain  -If negative, EMG/NCS  -Continue using AFO and home exercises  -F/u once above completed or sooner PRN    Problem List Items Addressed This Visit    None  Visit Diagnoses         Other cerebral infarction    -  Primary    Relevant Orders    MRI Brain Without Contrast          Tiago Rodriguez MD    Subjective:        HPI 7/16/25:     Ms. Corinne Morrison Marcus is a 59 y.o. female w/ PMH of anxiety, breast cancer (s/p mastectomy), depression, vit D deficiency (6 years ago) presenting for evaluation of R foot weakness.  Referred by Gene Sterling MD    On 5/7 [place] and rolled L ankle and fell- fractured L 5th metatarsal- was in boot for 1.5 months    On June 24th she had been sitting for approximately 2 hours with legs crossed and noted that she couldn't move her R foot at all (in any direction), came on acutely, had some slow recovery and has been improving since then. Denies any paresthesias at the onset.  Bought a foot rolling ball and brace (AFO) which have helped - wears brace when at home or arch support when out  Reports being about 75% back to normal, still has trouble with pickelball and tennis    About a  week ago started feeling tingling of RLE but that improved  Sometimes has lower back pain but denies any history of back injuries  No bowel/bladder issues    No specific diet  Drinks 2 glasses of wine over weekend  No history of diabetes      PAST MEDICAL HISTORY:  Past Medical History:   Diagnosis Date    Abnormal Pap smear of cervix     Abnormal Pap smear of vagina     Anxiety     Breast cancer 06/08/2022    Cancer     Constipation     Depression     Hemorrhoids     HLD (hyperlipidemia)     Lung nodule     Multinodular goiter 2010    s/p bx of 2 nodules in 2011 - benign    PONV (postoperative nausea and vomiting)        PAST SURGICAL HISTORY:  Past Surgical History:   Procedure Laterality Date    APPENDECTOMY      BILATERAL MASTECTOMY Bilateral 06/17/2022    BILATERAL SALPINGO-OOPHORECTOMY (BSO) N/A 11/21/2022    Procedure: SALPINGO-OOPHORECTOMY, BILATERAL;  Surgeon: Sheree Holden MD;  Location: [facility] OR;  Service: OB/GYN;  Laterality: N/A;    BREAST RECONSTRUCTION Bilateral 08/29/2022    COLONOSCOPY      4 as of 2015 [place] at [facility], next due in 2025    COLONOSCOPY N/A 10/27/2021    Procedure: COLONOSCOPY;  Surgeon: Garland Whalen MD;  Location: [facility] (4TH [facility]);  Service: Colon and Rectal;  Laterality: N/A;  Covid test 10/24 [place], instructions sent to [facility]   10/25 arrival time confirmed with pt-rb    COLPOPEXY N/A 11/21/2022    Procedure: UTEROSACRAL LIGAMENT SUSPENSION;  Surgeon: Sheree Holden MD;  Location: [facility] OR;  Service: OB/GYN;  Laterality: N/A;    COLPOSCOPY  11/19/2014    EXCISIONAL HEMORRHOIDECTOMY      HERNIA REPAIR  08/2024    INSERTION OF MIDURETHRAL SLING N/A 11/21/2022    Procedure: SLING, MIDURETHRAL;  Surgeon: Sheree Holden MD;  Location: [facility] OR;  Service: OB/GYN;  Laterality: N/A;    LAPAROSCOPIC TOTAL HYSTERECTOMY N/A 11/21/2022    Procedure: HYSTERECTOMY, TOTAL, LAPAROSCOPIC;  Surgeon: Sheree Holden MD;  Location: [facility] OR;  Service: OB/GYN;  Laterality: N/A;     "TOTAL REDUCTION MAMMOPLASTY      TOTAL REDUCTION MAMMOPLASTY Bilateral 06/01/2022    Procedure: MAMMOPLASTY, REDUCTION;  Surgeon: Dex Gutierrez MD;  Location: [facility];  Service: Plastics;  Laterality: Bilateral;       CURRENT MEDS:  Current Medications[1]    ALLERGIES:  Review of patient's allergies indicates:  No Known Allergies    FAMILY HISTORY:  Family History   Problem Relation Name Age of Onset    Breast cancer Maternal Grandmother Coco Macdonald 68    Cancer Maternal Grandmother Coco Macdonald     Lung cancer Father Hitesh Hollis         lung, + tobacco    Cancer Father Hitesh Hollis     Hypertension Mother Violet Potts     Heart attack Mother Violet Potts     No Known Problems Brother      No Known Problems Daughter      No Known Problems Daughter      No Known Problems Son      No Known Problems Son      Ovarian cancer Neg Hx      Colon cancer Neg Hx      Uterine cancer Neg Hx      Cervical cancer Neg Hx         SOCIAL HISTORY:  Social History[2]    Review of Systems:  12 system review of systems is negative except for the symptoms mentioned in HPI.      Objective:     Vitals:    07/16/25 0814   BP: 129/89   Pulse: 60   Weight: 60 kg (132 lb 4.4 oz)   Height: 5' 3" (1.6 m)     General: NAD, well-appearing  Eyes: no discharge or erythema   ENT: mucous membranes moist  Neck: Supple, full range of motion  Lungs: Normal work of breathing, not in respiratory distress  Skin: No rash or lesions  Psychiatry: Mood and affect are appropriate   Abdomen: flat, non-distended  Extremeties: No cyanosis or edema.    Neurological Exam:  MENTAL STATUS: Alert and oriented to person, place, and time. Attention and concentration within normal limits. Speech without dysarthria, able to name and repeat without difficulty. Recent and remote memory within normal limits.   CRANIAL NERVES: Visual fields intact. PERRL. EOMI. Facial sensation intact. Facial expression symmetrical. Hearing grossly intact. Full shoulder shrug " bilaterally.   SENSORY: Sensation is intact to light touch throughout. Decreased vibration sensation in R ankle. Intact and equal at bilateral toes.  MOTOR: Normal bulk and tone. 5/5 deltoid, biceps, triceps, interosseous, hand  bilaterally. 5/5 hip flexion/extension, knee extension/flexion bilaterally. 4/5 R foot dorsiflexion and foot eversion. 5/5 R foot plantarflexion. L foot 5/5 throughout.  REFLEXES: Symmetric and 2+ throughout.  CEREBELLAR/COORDINATION/GAIT: Gait steady with normal arm swing and stride length. Finger to nose intact. Unable to walk on heels due to R foot weakness. Toe walk intact.    Labs/Imaging:  Lab Results   Component Value Date    [id number] 434 05/13/2025              [1]   Current Outpatient Medications   Medication Sig Dispense Refill    atorvastatin (LIPITOR) 40 MG tablet TAKE 1 TABLET EVERY EVENING (NEEDS LABS PLEASE) 90 tablet 2    cetirizine (ZYRTEC) 5 MG tablet Take 5 mg by mouth once daily.      clindamycin-benzoyl peroxide gel APPLY TOPICALLY TO ACNE AREA EVERY DAY AT NIGHT. MAY DISCOLOR SHEETS AND TOWELS      fluticasone (FLONASE) 50 mcg/actuation nasal spray 1 spray by Each Nare route once daily.      letrozole (FEMARA) 2.5 mg Tab TAKE 1 TABLET DAILY 90 tablet 3    minoxidiL (LONITEN) 2.5 MG tablet Take 2.5 mg by mouth.      ondansetron (ZOFRAN-ODT) 4 MG TbDL Take 2 tablets (8 mg total) by mouth 2 (two) times daily. 15 tablet 3    sertraline (ZOLOFT) 100 MG tablet TAKE 2 TABLETS ONCE DAILY 180 tablet 3    spironolactone (ALDACTONE) 100 MG tablet Take 100 mg by mouth every morning.      traZODone (DESYREL) 50 MG tablet Take 2 tablets (100 mg total) by mouth every evening. 180 tablet 3     No current facility-administered medications for this visit.   [2]   Social History  Tobacco Use    Smoking status: Never    Smokeless tobacco: Never   Substance Use Topics    Alcohol use: Yes     Alcohol/week: 4.0 standard drinks of alcohol     Types: 4 Glasses of wine per week      Comment: socially    Drug use: No

## 2025-07-16 NOTE — TELEPHONE ENCOUNTER
Spoke with the imaging center and Fax over the referral to [facility] in [place] to fax number [phone] Sent on  7/16/2025 @ 2109 and Received @ 3242 completed

## 2025-07-17 ENCOUNTER — TELEPHONE (OUTPATIENT)
Dept: NEUROLOGY | Facility: CLINIC | Age: 60
End: 2025-07-17
Payer: COMMERCIAL

## 2025-07-17 NOTE — TELEPHONE ENCOUNTER
I left a message for the patient to let her know a MRI appointment is scheduled for her on 7/29 at 12:15. The patient was asked to call back to reschedule if this is not a good day and time for her.

## 2025-07-18 ENCOUNTER — PATIENT MESSAGE (OUTPATIENT)
Dept: INTERNAL MEDICINE | Facility: CLINIC | Age: 60
End: 2025-07-18
Payer: COMMERCIAL

## 2025-07-18 ENCOUNTER — OFFICE VISIT (OUTPATIENT)
Dept: INTERNAL MEDICINE | Facility: CLINIC | Age: 60
End: 2025-07-18
Payer: COMMERCIAL

## 2025-07-18 ENCOUNTER — TELEPHONE (OUTPATIENT)
Dept: INTERNAL MEDICINE | Facility: CLINIC | Age: 60
End: 2025-07-18
Payer: COMMERCIAL

## 2025-07-18 VITALS — DIASTOLIC BLOOD PRESSURE: 84 MMHG | BODY MASS INDEX: 23.43 KG/M2 | HEIGHT: 63 IN | SYSTOLIC BLOOD PRESSURE: 117 MMHG

## 2025-07-18 DIAGNOSIS — F32.1 CURRENT MODERATE EPISODE OF MAJOR DEPRESSIVE DISORDER WITHOUT PRIOR EPISODE: ICD-10-CM

## 2025-07-18 DIAGNOSIS — I10 PRIMARY HYPERTENSION: Primary | ICD-10-CM

## 2025-07-18 DIAGNOSIS — C50.911 BREAST CANCER METASTASIZED TO AXILLARY LYMPH NODE, RIGHT: ICD-10-CM

## 2025-07-18 DIAGNOSIS — C77.3 BREAST CANCER METASTASIZED TO AXILLARY LYMPH NODE, RIGHT: ICD-10-CM

## 2025-07-18 DIAGNOSIS — E04.2 MULTINODULAR THYROID: ICD-10-CM

## 2025-07-18 DIAGNOSIS — F43.20 GRIEF REACTION: ICD-10-CM

## 2025-07-18 NOTE — TELEPHONE ENCOUNTER
Informed Krishna that the message needs to be sent to the doctor that ordered the MRI so he can sign it.

## 2025-07-18 NOTE — PROGRESS NOTES
"The patient location is: [place]  The chief complaint leading to consultation is: bp    Visit type: audiovisual    Face to Face time with patient: 41  45 minutes of total time spent on the encounter, which includes face to face time and non-face to face time preparing to see the patient (eg, review of tests), Obtaining and/or reviewing separately obtained history, Documenting clinical information in the electronic or other health record, Independently interpreting results (not separately reported) and communicating results to the patient/family/caregiver, or Care coordination (not separately reported).         Each patient to whom he or she provides medical services by telemedicine is:  (1) informed of the relationship between the physician and patient and the respective role of any other health care provider with respect to management of the patient; and (2) notified that he or she may decline to receive medical services by telemedicine and may withdraw from such care at any time.    Notes:               [facility]    Subjective:       Patient ID: Corinne Morrison Marcus is a 59 y.o. female.    Chief Complaint: blood pressure      History was obtained from the patient and supplemented through chart review.  This patient is known to me.     HPI:    Pt is a 59 y.o. female r/ right breast invasive cancer, HTN, HLD, presents for htn f/u on audiovisual    HTN  117/84 at home  Normal yesteday with heme onc  Message sent for a few weeks    Hypercalcemia  Repeat ordered, not yet obtained  Will get with labs in sept    Hx Right Breast invasive cancer s/p resection  S/p bilat mastectomy with reconstruction  Stage 1B IDC of right Breast, ER+, IA+, Node +,  HER2-  H/O: Dr. Frazier  Taking letrozole for chemoprevention  Stable  CBC upcoming sept    Bereveament, stress with family  Adjustment reaction with anxiety and depression  Panic attacks  Seeing psychiatrist Shiva Madera, "on call" doing well, doing " therapy as well  Trazodone 50 mg qhs, Taking zoloft 200 mg, stable.      Thyroid nodule  Did not meet FNA threshold 2021, stable 2/2025    Not addressed  Improved on statin  Stable, repeat    Constipation managed  Nightly sennakot per GI, 1 ducolax  Stable, colonoscopy utd    Two daughters 18/20 who see me as PCP as well  three sons   passed after metastatic melanoma, death April 25th 2021      Medical History  Past Medical History:   Diagnosis Date    Abnormal Pap smear of cervix     Abnormal Pap smear of vagina     Anxiety     Breast cancer 06/08/2022    Cancer     Constipation     Depression     Hemorrhoids     HLD (hyperlipidemia)     Lung nodule     Multinodular goiter 2010    s/p bx of 2 nodules in 2011 - benign    PONV (postoperative nausea and vomiting)        Review of Systems   Constitutional:  Negative for activity change and unexpected weight change.   HENT:  Negative for hearing loss, rhinorrhea and trouble swallowing.    Eyes:  Negative for discharge and visual disturbance.   Respiratory:  Negative for chest tightness and wheezing.    Cardiovascular:  Negative for chest pain and palpitations.   Gastrointestinal:  Negative for blood in stool, constipation, diarrhea and vomiting.   Endocrine: Negative for polydipsia and polyuria.   Genitourinary:  Negative for difficulty urinating, dysuria, hematuria and menstrual problem.   Musculoskeletal:  Positive for arthralgias. Negative for joint swelling and neck pain.   Neurological:  Negative for weakness and headaches.   Psychiatric/Behavioral:  Negative for confusion and dysphoric mood.          Surgical hx, family hx, social hx   Have been reviewed      Current Outpatient Medications:     atorvastatin (LIPITOR) 40 MG tablet, TAKE 1 TABLET EVERY EVENING (NEEDS LABS PLEASE), Disp: 90 tablet, Rfl: 2    clindamycin-benzoyl peroxide gel, APPLY TOPICALLY TO ACNE AREA EVERY DAY AT NIGHT. MAY DISCOLOR SHEETS AND TOWELS, Disp: , Rfl:     letrozole (FEMARA) 2.5  "mg Tab, TAKE 1 TABLET DAILY, Disp: 90 tablet, Rfl: 3    minoxidiL (LONITEN) 2.5 MG tablet, Take 2.5 mg by mouth., Disp: , Rfl:     ondansetron (ZOFRAN-ODT) 4 MG TbDL, Take 2 tablets (8 mg total) by mouth 2 (two) times daily., Disp: 15 tablet, Rfl: 3    sertraline (ZOLOFT) 100 MG tablet, TAKE 2 TABLETS ONCE DAILY, Disp: 180 tablet, Rfl: 3    spironolactone (ALDACTONE) 100 MG tablet, Take 100 mg by mouth every morning., Disp: , Rfl:     traZODone (DESYREL) 50 MG tablet, Take 2 tablets (100 mg total) by mouth every evening., Disp: 180 tablet, Rfl: 3    cetirizine (ZYRTEC) 5 MG tablet, Take 5 mg by mouth once daily. (Patient not taking: Reported on 7/18/2025), Disp: , Rfl:     fluticasone (FLONASE) 50 mcg/actuation nasal spray, 1 spray by Each Nare route once daily. (Patient not taking: Reported on 7/18/2025), Disp: , Rfl:     Objective:        Body mass index is 23.43 kg/m².  Vitals:    07/18/25 1508   BP: 117/84   Height: 5' 3" (1.6 m)   PainSc: 0-No pain           Physical Exam  Vitals and nursing note reviewed.   Constitutional:       General: She is not in acute distress.     Appearance: Normal appearance. She is not ill-appearing.   HENT:      Head: Normocephalic and atraumatic.   Eyes:      General: No scleral icterus.  Pulmonary:      Effort: Pulmonary effort is normal.   Musculoskeletal:         General: No deformity.   Neurological:      Mental Status: She is alert and oriented to person, place, and time.   Psychiatric:         Behavior: Behavior normal.           Lab Results   Component Value Date    WBC 5.41 05/13/2025    WBC 5.41 05/13/2025    HGB 12.8 05/13/2025    HGB 12.8 05/13/2025    HCT 38.3 05/13/2025    HCT 38.3 05/13/2025     05/13/2025     05/13/2025    CHOL 240 (H) 05/13/2025    TRIG 171 (H) 05/13/2025    HDL 83 (H) 05/13/2025    ALT 21 05/13/2025    AST 31 05/13/2025     05/13/2025    K 4.2 05/13/2025     05/13/2025    CREATININE 0.8 05/13/2025    BUN 10 05/13/2025    " "CO2 28 05/13/2025    TSH 0.506 05/13/2025    INR 1.0 08/11/2022    HGBA1C 5.5 10/29/2021       The 10-year ASCVD risk score (Gavino BERNAL, et al., 2019) is: 2.9%    Values used to calculate the score:      Age: 59 years      Sex: Female      Is Non- : No      Diabetic: No      Tobacco smoker: No      Systolic Blood Pressure: 117 mmHg      Is BP treated: Yes      HDL Cholesterol: 83 mg/dL      Total Cholesterol: 240 mg/dL    (Imaging have been independently reviewed)  Reviewed MRI breast 6/2022    Assessment:         1. Primary hypertension    2. Current moderate episode of major depressive disorder without prior episode    3. Grief reaction    4. Breast cancer metastasized to axillary lymph node, right    5. Multinodular thyroid unchanged since 2017, most recent  2/2025              Plan:     Corinne "Rini" was seen today for blood pressure.    Diagnoses and all orders for this visit:    Primary hypertension    Current moderate episode of major depressive disorder without prior episode    Grief reaction    Breast cancer metastasized to axillary lymph node, right    Multinodular thyroid unchanged since 2017, most recent  2/2025            Health Maintenance  - Lipids:   - A1C:   - Colon Ca Screen: colonoscopy Dr. Solorzano at [facility] 2021, repeat 5 years (pt thought 10) due to adenoma on pathology  - Immunizations: cvid vaccinated    Women's health  - Pap: NILM Nov 2020 Dr. Marino   - Mammo: s/p breast cancer  - Dexa: N/A due to age  - Contraception: post-menopausal    Follow up if symptoms worsen or fail to improve, for as planned previously. I      Visit today is associated with current or anticipated ongoing medical care related to this patient's single serious condition/complex condition of htn, bereavement. The patient will return to see me as these issues will be followed longitudinally.     All medications were reviewed including potential side effects and risks/benefits.  Pt was " counseled to call back if anything worsens or if questions arise.    Gene Sterling MD  Family Medicine  [facility]  [address] [address]  Phone [phone]  Fax [phone]

## 2025-07-22 ENCOUNTER — HOSPITAL ENCOUNTER (OUTPATIENT)
Dept: RADIOLOGY | Facility: OTHER | Age: 60
Discharge: HOME | End: 2025-07-22
Attending: NURSE PRACTITIONER
Payer: COMMERCIAL

## 2025-07-22 DIAGNOSIS — Z79.811 AROMATASE INHIBITOR USE: ICD-10-CM

## 2025-07-22 PROCEDURE — 77080 DXA BONE DENSITY AXIAL: CPT | Mod: 26,,, | Performed by: RADIOLOGY

## 2025-07-22 PROCEDURE — 77080 DXA BONE DENSITY AXIAL: CPT | Mod: TC

## 2025-08-03 DIAGNOSIS — G47.00 INSOMNIA, UNSPECIFIED TYPE: ICD-10-CM

## 2025-08-04 RX ORDER — TRAZODONE HYDROCHLORIDE 50 MG/1
TABLET ORAL
Refills: 0 | OUTPATIENT
Start: 2025-08-04

## 2025-08-05 NOTE — TELEPHONE ENCOUNTER
Quick DC. Inappropriate Request    Refill Authorization Note   Corinne Marcus  is requesting a refill authorization.  Brief Assessment and Rationale for Refill:  Quick Discontinue  Medication Therapy Plan:       Medication Reconciliation Completed:  No      Comments:     Note composed:7:00 PM 08/04/2025

## 2025-08-06 ENCOUNTER — PATIENT MESSAGE (OUTPATIENT)
Dept: INTERNAL MEDICINE | Facility: CLINIC | Age: 60
End: 2025-08-06
Payer: COMMERCIAL

## 2025-08-07 ENCOUNTER — PATIENT MESSAGE (OUTPATIENT)
Dept: INTERNAL MEDICINE | Facility: CLINIC | Age: 60
End: 2025-08-07
Payer: COMMERCIAL

## 2025-08-07 DIAGNOSIS — I63.89 OTHER CEREBRAL INFARCTION: Primary | ICD-10-CM

## 2025-08-20 ENCOUNTER — PATIENT MESSAGE (OUTPATIENT)
Dept: INTERNAL MEDICINE | Facility: CLINIC | Age: 60
End: 2025-08-20
Payer: COMMERCIAL

## 2025-08-20 ENCOUNTER — TELEPHONE (OUTPATIENT)
Dept: INTERNAL MEDICINE | Facility: CLINIC | Age: 60
End: 2025-08-20
Payer: COMMERCIAL

## (undated) DEVICE — SOL STRL WATER INJ 1000ML BG

## (undated) DEVICE — APPLICATOR CHLORAPREP ORN 26ML

## (undated) DEVICE — JELLY SURGILUBE 5GR

## (undated) DEVICE — GOWN NONREINF SET-IN SLV XL

## (undated) DEVICE — SYR 10CC LUER LOCK

## (undated) DEVICE — DEVICE CLOSURE DISP 14G

## (undated) DEVICE — Device

## (undated) DEVICE — SET CYSTO IRRIGATION UNIV SPIK

## (undated) DEVICE — DRAPE ABDOMINAL TIBURON 14X11

## (undated) DEVICE — TROCAR ENDOPATH XCEL 11MM 10CM

## (undated) DEVICE — SPONGE DERMACEA GAUZE 4X4

## (undated) DEVICE — SUT MONOCYRL 4-0 PS2 UND

## (undated) DEVICE — SUT VICRYL PLUS 0 CT1 36IN

## (undated) DEVICE — OCCLUDER COLPO-PNEUMO STERILE

## (undated) DEVICE — COUNTER NDL MAGNETIC STR

## (undated) DEVICE — SUT 0 VICRYL / CT-1

## (undated) DEVICE — PAD PREP CUFFED NS 24X48IN

## (undated) DEVICE — ELECTRODE NEEDLE 1IN

## (undated) DEVICE — TROCAR KII FIOS 5MM X 100MM

## (undated) DEVICE — DRAPE LEGGINGS CUFF 33X51IN

## (undated) DEVICE — SOL NS 1000CC

## (undated) DEVICE — PAD SANITARY OB STERILE

## (undated) DEVICE — ELECTRODE REM PLYHSV RETURN 9

## (undated) DEVICE — SEE MEDLINE ITEM 154981

## (undated) DEVICE — SUT MCRYL PLUS 4-0 PS2 27IN

## (undated) DEVICE — POSITIONER HEEL FOAM CONVOLTD

## (undated) DEVICE — SYR 50CC LL

## (undated) DEVICE — TIP RUMI BLUE DISPOSABLE 5/BX

## (undated) DEVICE — UNDERGLOVES BIOGEL PI SZ 6 LF

## (undated) DEVICE — DEVICE ENSEAL X1 CRV JAW 37CM

## (undated) DEVICE — TOWEL OR DISP STRL BLUE 4/PK

## (undated) DEVICE — GOWN POLY REINF BRTH SLV XL

## (undated) DEVICE — SOL NACL STRL BOTTLE 1000ML

## (undated) DEVICE — CANNULA ENDOPATH XCEL 5X100MM

## (undated) DEVICE — KIT WING PAD POSITIONING

## (undated) DEVICE — KIT ANTIFOG W/SPONG & FLUID

## (undated) DEVICE — IRRIGATOR ENDOSCOPY DISP.

## (undated) DEVICE — GLOVE BIOGEL ECLIPSE SZ 7

## (undated) DEVICE — TUBING INSUFFLATOR W/FILTR 10

## (undated) DEVICE — SUT PROLENE 0 CT-2 BL MONO

## (undated) DEVICE — SUT 2-0 VICRYL / SH (J417)

## (undated) DEVICE — UNDERGLOVES BIOGEL PI SZ 7 LF

## (undated) DEVICE — APPLICATOR STRL COT 2INNR 6IN

## (undated) DEVICE — TROCAR ENDOPATH XCEL 5X100MM

## (undated) DEVICE — GLOVE BIOGEL ECLIPSE SZ 6.5

## (undated) DEVICE — SUT CTD VICRYL 0 UND BR

## (undated) DEVICE — DRESSING GAUZE PETRO 3X9

## (undated) DEVICE — ADHESIVE DERMABOND ADVANCED